# Patient Record
Sex: FEMALE | Race: WHITE | Employment: PART TIME | URBAN - METROPOLITAN AREA
[De-identification: names, ages, dates, MRNs, and addresses within clinical notes are randomized per-mention and may not be internally consistent; named-entity substitution may affect disease eponyms.]

---

## 2017-01-25 RX ORDER — LISINOPRIL 10 MG/1
TABLET ORAL
Qty: 90 TABLET | Refills: 1 | Status: SHIPPED | OUTPATIENT
Start: 2017-01-25 | End: 2017-08-16

## 2017-01-25 RX ORDER — ATORVASTATIN CALCIUM 20 MG/1
TABLET, FILM COATED ORAL
Qty: 90 TABLET | Refills: 1 | Status: SHIPPED | OUTPATIENT
Start: 2017-01-25 | End: 2017-08-16

## 2017-06-05 ENCOUNTER — HOSPITAL ENCOUNTER (OUTPATIENT)
Dept: GENERAL RADIOLOGY | Age: 57
Discharge: HOME | End: 2017-06-05
Attending: FAMILY MEDICINE
Payer: COMMERCIAL

## 2017-06-05 ENCOUNTER — OFFICE VISIT (OUTPATIENT)
Dept: FAMILY MEDICINE CLINIC | Facility: CLINIC | Age: 57
End: 2017-06-05

## 2017-06-05 VITALS
HEIGHT: 69.5 IN | DIASTOLIC BLOOD PRESSURE: 70 MMHG | SYSTOLIC BLOOD PRESSURE: 110 MMHG | RESPIRATION RATE: 16 BRPM | BODY MASS INDEX: 24.32 KG/M2 | WEIGHT: 168 LBS | HEART RATE: 64 BPM

## 2017-06-05 DIAGNOSIS — M25.512 ACUTE PAIN OF LEFT SHOULDER: ICD-10-CM

## 2017-06-05 DIAGNOSIS — E55.9 VITAMIN D DEFICIENCY: ICD-10-CM

## 2017-06-05 DIAGNOSIS — Z00.00 ROUTINE GENERAL MEDICAL EXAMINATION AT A HEALTH CARE FACILITY: Primary | ICD-10-CM

## 2017-06-05 DIAGNOSIS — I10 ESSENTIAL HYPERTENSION, BENIGN: ICD-10-CM

## 2017-06-05 DIAGNOSIS — E78.5 DYSLIPIDEMIA: ICD-10-CM

## 2017-06-05 DIAGNOSIS — L65.9 HAIR LOSS: ICD-10-CM

## 2017-06-05 DIAGNOSIS — Z13.89 SCREENING FOR GENITOURINARY CONDITION: ICD-10-CM

## 2017-06-05 PROCEDURE — 99396 PREV VISIT EST AGE 40-64: CPT | Performed by: FAMILY MEDICINE

## 2017-06-05 PROCEDURE — 73030 X-RAY EXAM OF SHOULDER: CPT | Performed by: FAMILY MEDICINE

## 2017-06-05 PROCEDURE — 81003 URINALYSIS AUTO W/O SCOPE: CPT | Performed by: FAMILY MEDICINE

## 2017-06-05 NOTE — H&P
CC: Annual Physical Exam    HPI:   Hortencia Cummings is a 49year old female who presents for a complete physical exam. Symptoms: is menopausal. Patient complains of thinning eye brows, left arm/shoulder discomfort for the past 1 month and no trauma, urin pregnancy    • Unspecified hemorrhoids without mention of complication    • Unspecified essential hypertension    • Pleurisy without mention of effusion or current tuberculosis    • Other psoriasis    • Esophageal reflux    • Migraines           Past Surgi otherwise  SKIN: denies any unusual skin lesions  EYES:denies blurred vision or double vision  HEENT: denies nasal congestion, sinus pain or ST; thinning eyebrows  LUNGS: denies shortness of breath with exertion  CARDIOVASCULAR: denies chest pain on exerti [E]    Dr. Tacos Tejeda for OB/gyne. Colonoscopy due in 2021 per Dr. Ovidio Albarran.  UA is normal.  Last eye exam -- goes yearly -- fall 2016  Last dental exam -- 4 months ago  Pt' s weight is Body mass index is 24.46 kg/(m^2). , recommended low fat diet and aerobic exercise 3

## 2017-06-06 ENCOUNTER — APPOINTMENT (OUTPATIENT)
Dept: LAB | Age: 57
End: 2017-06-06
Attending: FAMILY MEDICINE
Payer: COMMERCIAL

## 2017-06-06 DIAGNOSIS — E78.5 DYSLIPIDEMIA: ICD-10-CM

## 2017-06-06 DIAGNOSIS — I10 ESSENTIAL HYPERTENSION, BENIGN: ICD-10-CM

## 2017-06-06 DIAGNOSIS — L65.9 HAIR LOSS: ICD-10-CM

## 2017-06-06 DIAGNOSIS — E55.9 VITAMIN D DEFICIENCY: ICD-10-CM

## 2017-06-06 PROCEDURE — 80061 LIPID PANEL: CPT

## 2017-06-06 PROCEDURE — 80053 COMPREHEN METABOLIC PANEL: CPT

## 2017-06-06 PROCEDURE — 36415 COLL VENOUS BLD VENIPUNCTURE: CPT

## 2017-06-06 PROCEDURE — 84439 ASSAY OF FREE THYROXINE: CPT

## 2017-06-06 PROCEDURE — 82306 VITAMIN D 25 HYDROXY: CPT

## 2017-06-06 PROCEDURE — 84443 ASSAY THYROID STIM HORMONE: CPT

## 2017-08-18 RX ORDER — ATORVASTATIN CALCIUM 20 MG/1
TABLET, FILM COATED ORAL
Qty: 90 TABLET | Refills: 0 | Status: SHIPPED | OUTPATIENT
Start: 2017-08-18 | End: 2017-10-26

## 2017-08-18 RX ORDER — LISINOPRIL 10 MG/1
TABLET ORAL
Qty: 90 TABLET | Refills: 0 | Status: SHIPPED | OUTPATIENT
Start: 2017-08-18 | End: 2017-10-26

## 2017-10-26 RX ORDER — ATORVASTATIN CALCIUM 20 MG/1
TABLET, FILM COATED ORAL
Qty: 90 TABLET | Refills: 0 | Status: SHIPPED | OUTPATIENT
Start: 2017-10-26 | End: 2018-02-13

## 2017-10-26 RX ORDER — LISINOPRIL 10 MG/1
TABLET ORAL
Qty: 90 TABLET | Refills: 0 | Status: SHIPPED | OUTPATIENT
Start: 2017-10-26 | End: 2018-02-13

## 2017-12-19 ENCOUNTER — OFFICE VISIT (OUTPATIENT)
Dept: FAMILY MEDICINE CLINIC | Facility: CLINIC | Age: 57
End: 2017-12-19

## 2017-12-19 ENCOUNTER — LAB ENCOUNTER (OUTPATIENT)
Dept: LAB | Age: 57
End: 2017-12-19
Attending: FAMILY MEDICINE
Payer: COMMERCIAL

## 2017-12-19 VITALS
SYSTOLIC BLOOD PRESSURE: 120 MMHG | HEART RATE: 68 BPM | RESPIRATION RATE: 14 BRPM | BODY MASS INDEX: 25.05 KG/M2 | HEIGHT: 69.5 IN | DIASTOLIC BLOOD PRESSURE: 80 MMHG | WEIGHT: 173 LBS

## 2017-12-19 DIAGNOSIS — Z13.0 SCREENING, ANEMIA, DEFICIENCY, IRON: ICD-10-CM

## 2017-12-19 DIAGNOSIS — E55.9 VITAMIN D DEFICIENCY: ICD-10-CM

## 2017-12-19 DIAGNOSIS — G89.29 CHRONIC LEFT SHOULDER PAIN: ICD-10-CM

## 2017-12-19 DIAGNOSIS — E78.5 DYSLIPIDEMIA: ICD-10-CM

## 2017-12-19 DIAGNOSIS — I10 ESSENTIAL HYPERTENSION, BENIGN: Primary | ICD-10-CM

## 2017-12-19 DIAGNOSIS — L65.9 HAIR LOSS: ICD-10-CM

## 2017-12-19 DIAGNOSIS — I10 ESSENTIAL HYPERTENSION, BENIGN: ICD-10-CM

## 2017-12-19 DIAGNOSIS — G43.019 INTRACTABLE MIGRAINE WITHOUT AURA AND WITHOUT STATUS MIGRAINOSUS: ICD-10-CM

## 2017-12-19 DIAGNOSIS — M25.512 CHRONIC LEFT SHOULDER PAIN: ICD-10-CM

## 2017-12-19 PROCEDURE — 82306 VITAMIN D 25 HYDROXY: CPT

## 2017-12-19 PROCEDURE — 84439 ASSAY OF FREE THYROXINE: CPT

## 2017-12-19 PROCEDURE — 85025 COMPLETE CBC W/AUTO DIFF WBC: CPT

## 2017-12-19 PROCEDURE — 99214 OFFICE O/P EST MOD 30 MIN: CPT | Performed by: FAMILY MEDICINE

## 2017-12-19 PROCEDURE — 80061 LIPID PANEL: CPT

## 2017-12-19 PROCEDURE — 80053 COMPREHEN METABOLIC PANEL: CPT

## 2017-12-19 PROCEDURE — 84443 ASSAY THYROID STIM HORMONE: CPT

## 2017-12-19 PROCEDURE — 36415 COLL VENOUS BLD VENIPUNCTURE: CPT

## 2017-12-19 RX ORDER — OYSTER SHELL CALCIUM WITH VITAMIN D 500; 200 MG/1; [IU]/1
1 TABLET, FILM COATED ORAL DAILY
COMMUNITY
End: 2018-06-20

## 2017-12-19 NOTE — PROGRESS NOTES
Justo Rajan is a 56year old female. HPI:   Pt. Is here for a med check. Pt. Complains of her eyebrows thinning.   HTN -- pt is taking lisinopril -- no side effects; /70's at home; eye exam UTD  Dyslipidemia -- pt is taking lipitor -- no side hemorrhoids without mention of complication       Social History:  Smoking status: Never Smoker                                                              Smokeless tobacco: Never Used                      Alcohol use:  Yes              Comment: 1 a week Sitting, Cuff Size: adult)   Pulse 68   Resp 14   Ht 69.5\"   Wt 173 lb   LMP 05/07/2013   BMI 25.18 kg/m²   GENERAL: well developed, well nourished,in no apparent distress  PSYCHE: normal mood and affect  SKIN: no rashes,no suspicious lesions; very thin e

## 2018-01-16 ENCOUNTER — OFFICE VISIT (OUTPATIENT)
Dept: FAMILY MEDICINE CLINIC | Facility: CLINIC | Age: 58
End: 2018-01-16

## 2018-01-16 VITALS
HEART RATE: 102 BPM | DIASTOLIC BLOOD PRESSURE: 70 MMHG | WEIGHT: 174 LBS | RESPIRATION RATE: 18 BRPM | BODY MASS INDEX: 25.19 KG/M2 | TEMPERATURE: 99 F | HEIGHT: 69.5 IN | SYSTOLIC BLOOD PRESSURE: 110 MMHG | OXYGEN SATURATION: 100 %

## 2018-01-16 DIAGNOSIS — J02.9 SORE THROAT: ICD-10-CM

## 2018-01-16 DIAGNOSIS — J06.9 UPPER RESPIRATORY TRACT INFECTION, UNSPECIFIED TYPE: Primary | ICD-10-CM

## 2018-01-16 LAB — CONTROL LINE PRESENT WITH A CLEAR BACKGROUND (YES/NO): YES YES/NO

## 2018-01-16 PROCEDURE — 87081 CULTURE SCREEN ONLY: CPT | Performed by: NURSE PRACTITIONER

## 2018-01-16 PROCEDURE — 87880 STREP A ASSAY W/OPTIC: CPT | Performed by: NURSE PRACTITIONER

## 2018-01-16 PROCEDURE — 99213 OFFICE O/P EST LOW 20 MIN: CPT | Performed by: NURSE PRACTITIONER

## 2018-01-16 RX ORDER — AZITHROMYCIN 250 MG/1
TABLET, FILM COATED ORAL
Qty: 6 TABLET | Refills: 0 | Status: SHIPPED | OUTPATIENT
Start: 2018-01-16 | End: 2018-04-17

## 2018-01-16 NOTE — PROGRESS NOTES
Alida Munoz is a 56year old female. HPI:   Patient presents today reporting a 5 day history of a sore throat and pain when swallowing. She also reports feeling fatigued. She reports she feels as though her symptoms are getting worse.  She denies an Migraines    • Other acne    • Other psoriasis    • Pleurisy without mention of effusion or current tuberculosis    • Unspecified ectopic pregnancy without intrauterine pregnancy    • Unspecified essential hypertension    • Unspecified hemorrhoids without Consults:  None    Follow Up with:  No follow-up provider specified. 1. Upper respiratory tract infection, unspecified type  Rapid strep was negative. Will send for culture. Will start Azithromycin. Pt instructed to take medication with food.   Denis Abbott

## 2018-01-24 PROCEDURE — 87624 HPV HI-RISK TYP POOLED RSLT: CPT | Performed by: OBSTETRICS & GYNECOLOGY

## 2018-01-24 PROCEDURE — 88175 CYTOPATH C/V AUTO FLUID REDO: CPT | Performed by: OBSTETRICS & GYNECOLOGY

## 2018-02-14 RX ORDER — ATORVASTATIN CALCIUM 20 MG/1
TABLET, FILM COATED ORAL
Qty: 90 TABLET | Refills: 1 | Status: SHIPPED | OUTPATIENT
Start: 2018-02-14 | End: 2018-08-13

## 2018-02-14 RX ORDER — LISINOPRIL 10 MG/1
TABLET ORAL
Qty: 90 TABLET | Refills: 1 | Status: SHIPPED | OUTPATIENT
Start: 2018-02-14 | End: 2018-08-13

## 2018-03-13 ENCOUNTER — PATIENT OUTREACH (OUTPATIENT)
Dept: FAMILY MEDICINE CLINIC | Facility: CLINIC | Age: 58
End: 2018-03-13

## 2018-03-13 NOTE — PROGRESS NOTES
Patient's bp elevated at her office visit with OB (138/94). Pt has diagnosis of hypertension. Pt due for med visit 06/2018.  Can we have patient have her blood pressure rechecked-she can go to any [facility] and have this completed at no

## 2018-06-20 ENCOUNTER — LAB ENCOUNTER (OUTPATIENT)
Dept: LAB | Age: 58
End: 2018-06-20
Attending: FAMILY MEDICINE
Payer: COMMERCIAL

## 2018-06-20 ENCOUNTER — OFFICE VISIT (OUTPATIENT)
Dept: FAMILY MEDICINE CLINIC | Facility: CLINIC | Age: 58
End: 2018-06-20

## 2018-06-20 VITALS
HEART RATE: 72 BPM | RESPIRATION RATE: 12 BRPM | HEIGHT: 69.5 IN | BODY MASS INDEX: 25.34 KG/M2 | WEIGHT: 175 LBS | DIASTOLIC BLOOD PRESSURE: 80 MMHG | SYSTOLIC BLOOD PRESSURE: 120 MMHG

## 2018-06-20 DIAGNOSIS — Z13.29 SCREENING FOR ENDOCRINE, METABOLIC AND IMMUNITY DISORDER: ICD-10-CM

## 2018-06-20 DIAGNOSIS — Z13.228 SCREENING FOR ENDOCRINE, METABOLIC AND IMMUNITY DISORDER: Primary | ICD-10-CM

## 2018-06-20 DIAGNOSIS — Z13.29 SCREENING FOR ENDOCRINE, METABOLIC AND IMMUNITY DISORDER: Primary | ICD-10-CM

## 2018-06-20 DIAGNOSIS — E55.9 VITAMIN D DEFICIENCY: ICD-10-CM

## 2018-06-20 DIAGNOSIS — I10 ESSENTIAL HYPERTENSION, BENIGN: Primary | ICD-10-CM

## 2018-06-20 DIAGNOSIS — M25.562 ACUTE PAIN OF BOTH KNEES: ICD-10-CM

## 2018-06-20 DIAGNOSIS — Z78.0 POST-MENOPAUSAL: ICD-10-CM

## 2018-06-20 DIAGNOSIS — Z13.0 SCREENING FOR ENDOCRINE, METABOLIC AND IMMUNITY DISORDER: Primary | ICD-10-CM

## 2018-06-20 DIAGNOSIS — Z13.29 SCREENING FOR THYROID DISORDER: ICD-10-CM

## 2018-06-20 DIAGNOSIS — E78.5 DYSLIPIDEMIA: ICD-10-CM

## 2018-06-20 DIAGNOSIS — Z13.820 SCREENING FOR OSTEOPOROSIS: ICD-10-CM

## 2018-06-20 DIAGNOSIS — M25.561 ACUTE PAIN OF BOTH KNEES: ICD-10-CM

## 2018-06-20 DIAGNOSIS — I10 ESSENTIAL HYPERTENSION, BENIGN: ICD-10-CM

## 2018-06-20 DIAGNOSIS — L65.9 HAIR LOSS: ICD-10-CM

## 2018-06-20 DIAGNOSIS — Z13.228 SCREENING FOR ENDOCRINE, METABOLIC AND IMMUNITY DISORDER: ICD-10-CM

## 2018-06-20 DIAGNOSIS — M67.40 GANGLION CYST: ICD-10-CM

## 2018-06-20 DIAGNOSIS — Z13.0 SCREENING FOR ENDOCRINE, METABOLIC AND IMMUNITY DISORDER: ICD-10-CM

## 2018-06-20 PROCEDURE — 86706 HEP B SURFACE ANTIBODY: CPT

## 2018-06-20 PROCEDURE — 82306 VITAMIN D 25 HYDROXY: CPT

## 2018-06-20 PROCEDURE — 86200 CCP ANTIBODY: CPT

## 2018-06-20 PROCEDURE — 86140 C-REACTIVE PROTEIN: CPT

## 2018-06-20 PROCEDURE — 80053 COMPREHEN METABOLIC PANEL: CPT

## 2018-06-20 PROCEDURE — 84443 ASSAY THYROID STIM HORMONE: CPT

## 2018-06-20 PROCEDURE — 85652 RBC SED RATE AUTOMATED: CPT

## 2018-06-20 PROCEDURE — 99214 OFFICE O/P EST MOD 30 MIN: CPT | Performed by: FAMILY MEDICINE

## 2018-06-20 PROCEDURE — 86376 MICROSOMAL ANTIBODY EACH: CPT

## 2018-06-20 PROCEDURE — 84550 ASSAY OF BLOOD/URIC ACID: CPT

## 2018-06-20 PROCEDURE — 80061 LIPID PANEL: CPT

## 2018-06-20 PROCEDURE — 86431 RHEUMATOID FACTOR QUANT: CPT

## 2018-06-20 PROCEDURE — 36415 COLL VENOUS BLD VENIPUNCTURE: CPT

## 2018-06-20 PROCEDURE — 86038 ANTINUCLEAR ANTIBODIES: CPT

## 2018-06-20 PROCEDURE — 84439 ASSAY OF FREE THYROXINE: CPT

## 2018-06-20 PROCEDURE — 86800 THYROGLOBULIN ANTIBODY: CPT

## 2018-06-20 RX ORDER — OMEPRAZOLE 20 MG/1
20 CAPSULE, DELAYED RELEASE ORAL EVERY OTHER DAY
COMMUNITY
End: 2021-01-22

## 2018-06-20 RX ORDER — SUMATRIPTAN 100 MG/1
TABLET, FILM COATED ORAL
Qty: 27 TABLET | Refills: 0 | Status: SHIPPED | OUTPATIENT
Start: 2018-06-20

## 2018-06-20 NOTE — PROGRESS NOTES
Kourtney Boyd is a 66year old female. HPI:   Pt. Is here for a med check. Pt complains of bump on her right index finger for the past week. Pt. Complains of dry skin on her feet for the past few months and does not know why.   She has been walking cholesterol    • Hypercholesterolemia    • Migraines    • Other psoriasis    • Rosacea    • Skin blushing/flushing    • Unspecified essential hypertension    • Wears glasses       Social History:  Smoking status: Never Smoker AM                                OB/GYNE AT [facility]                                                       First Screen:          Cris Hammonds                                                      Rescreen:              Stanley Mendiola immunity disorder      Orders Placed This Encounter      Lipid Panel      Comp Metabolic Panel (14) [E]      Vitamin D, 25-Hydroxy [E]      TSH and Free T4 [E]      Rheumatoid Arthritis Factor      Uric Acid, Serum      Cyclic Citrullinate Peptide (CCP) an

## 2018-06-22 ENCOUNTER — HOSPITAL ENCOUNTER (OUTPATIENT)
Dept: BONE DENSITY | Age: 58
Discharge: HOME | End: 2018-06-22
Attending: FAMILY MEDICINE
Payer: COMMERCIAL

## 2018-06-22 ENCOUNTER — HOSPITAL ENCOUNTER (OUTPATIENT)
Dept: GENERAL RADIOLOGY | Age: 58
Discharge: HOME | End: 2018-06-22
Attending: FAMILY MEDICINE
Payer: COMMERCIAL

## 2018-06-22 DIAGNOSIS — M25.561 ACUTE PAIN OF BOTH KNEES: ICD-10-CM

## 2018-06-22 DIAGNOSIS — M25.562 ACUTE PAIN OF BOTH KNEES: ICD-10-CM

## 2018-06-22 DIAGNOSIS — Z13.820 SCREENING FOR OSTEOPOROSIS: ICD-10-CM

## 2018-06-22 DIAGNOSIS — Z78.0 POST-MENOPAUSAL: ICD-10-CM

## 2018-06-22 DIAGNOSIS — E55.9 VITAMIN D DEFICIENCY: ICD-10-CM

## 2018-06-22 PROCEDURE — 73560 X-RAY EXAM OF KNEE 1 OR 2: CPT | Performed by: FAMILY MEDICINE

## 2018-06-22 PROCEDURE — 77080 DXA BONE DENSITY AXIAL: CPT | Performed by: FAMILY MEDICINE

## 2018-06-26 ENCOUNTER — PATIENT MESSAGE (OUTPATIENT)
Dept: FAMILY MEDICINE CLINIC | Facility: CLINIC | Age: 58
End: 2018-06-26

## 2018-06-26 NOTE — TELEPHONE ENCOUNTER
From: Zachary Magaña  To: Chris Sanchez DO  Sent: 6/26/2018 7:58 AM CDT  Subject: Prescription Question    Hi,    Unfortunately [facility] is being funny about filling my prescription. This is a copy of the message I received.    SUMATRIPTAN TABS

## 2018-06-26 NOTE — TELEPHONE ENCOUNTER
I do not know anything about this. Please call [facility] and see if the prescription is being processed.

## 2018-07-17 ENCOUNTER — OFFICE VISIT (OUTPATIENT)
Dept: PHYSICAL THERAPY | Age: 58
End: 2018-07-17
Attending: FAMILY MEDICINE
Payer: COMMERCIAL

## 2018-07-17 DIAGNOSIS — M25.562 ACUTE PAIN OF BOTH KNEES: ICD-10-CM

## 2018-07-17 DIAGNOSIS — M25.561 ACUTE PAIN OF BOTH KNEES: ICD-10-CM

## 2018-07-17 PROCEDURE — 97162 PT EVAL MOD COMPLEX 30 MIN: CPT

## 2018-07-17 PROCEDURE — 97110 THERAPEUTIC EXERCISES: CPT

## 2018-07-17 NOTE — PROGRESS NOTES
LOWER EXTREMITY EVALUATION:   Referring Physician: Dr. Candace Gonzalez  Diagnosis: bilateral knee pain      Date of Service: 7/17/2018     PATIENT SUMMARY   Cristhian Foster is a 66year old y/o female who presents to therapy today with complaints of May-June s Significant findings include high cholesterol, HTN, anemia, OA    ASSESSMENT  Pt demonstrates pain consistent with knee OA L>R. She has capsular restrictions with knee extension limitations passively L>R.  She has pain with knee flexion AROM bilaterally denson -  Posterior Drawer Test: R -, L -  Denys's Test: R -, L -  Apley Compression: R -, L -  Thessaly Test: R -, L -  Patellar Apprehension: R -, L -  SLS: R 30 sec, L 30 sec  Double Leg Squat:  Anterior tibial translation, *anterior knee pain 1/2 range   S instructions; modalities as indicated    Education or treatment limitation: None  Rehab Potential:good    Patient/Family/Caregiver was advised of these findings, precautions, and treatment options and has agreed to actively participate in planning and for

## 2018-07-24 ENCOUNTER — OFFICE VISIT (OUTPATIENT)
Dept: PHYSICAL THERAPY | Age: 58
End: 2018-07-24
Attending: FAMILY MEDICINE
Payer: COMMERCIAL

## 2018-07-24 PROCEDURE — 97140 MANUAL THERAPY 1/> REGIONS: CPT

## 2018-07-24 PROCEDURE — 97530 THERAPEUTIC ACTIVITIES: CPT

## 2018-07-24 PROCEDURE — 97110 THERAPEUTIC EXERCISES: CPT

## 2018-07-24 NOTE — PROGRESS NOTES
Dx: bilateral knee pain          Authorized # of Visits:  No cpy no precert C         Next MD visit: as needed  Fall Risk: standard         Precautions: none             Subjective: Pt reports she walked this Am and felt her knees on the decline and init 30 sec hold x 2 sets R/L         Hip abduction 10 reps R/L         Bridge 10 sec hold 10 reps bent knee          Bridge on ball - straight leg 10 reps x 2 sets         Swiss ball rolls 20 reps          Standing quad stretch 30 sec x 2 sets         SLS 30 s

## 2018-07-26 ENCOUNTER — APPOINTMENT (OUTPATIENT)
Dept: PHYSICAL THERAPY | Age: 58
End: 2018-07-26
Attending: FAMILY MEDICINE
Payer: COMMERCIAL

## 2018-07-31 ENCOUNTER — APPOINTMENT (OUTPATIENT)
Dept: PHYSICAL THERAPY | Age: 58
End: 2018-07-31
Attending: FAMILY MEDICINE
Payer: COMMERCIAL

## 2018-08-02 ENCOUNTER — APPOINTMENT (OUTPATIENT)
Dept: PHYSICAL THERAPY | Age: 58
End: 2018-08-02
Attending: FAMILY MEDICINE
Payer: COMMERCIAL

## 2018-08-09 ENCOUNTER — APPOINTMENT (OUTPATIENT)
Dept: PHYSICAL THERAPY | Age: 58
End: 2018-08-09
Attending: FAMILY MEDICINE
Payer: COMMERCIAL

## 2018-08-14 ENCOUNTER — OFFICE VISIT (OUTPATIENT)
Dept: FAMILY MEDICINE CLINIC | Facility: CLINIC | Age: 58
End: 2018-08-14
Payer: COMMERCIAL

## 2018-08-14 VITALS
SYSTOLIC BLOOD PRESSURE: 120 MMHG | HEART RATE: 72 BPM | WEIGHT: 170 LBS | HEIGHT: 69.5 IN | RESPIRATION RATE: 16 BRPM | BODY MASS INDEX: 24.61 KG/M2 | DIASTOLIC BLOOD PRESSURE: 70 MMHG

## 2018-08-14 DIAGNOSIS — I10 ESSENTIAL HYPERTENSION, BENIGN: ICD-10-CM

## 2018-08-14 DIAGNOSIS — Z13.0 SCREENING FOR DEFICIENCY ANEMIA: ICD-10-CM

## 2018-08-14 DIAGNOSIS — L65.9 ALOPECIA OF SCALP: ICD-10-CM

## 2018-08-14 DIAGNOSIS — M85.89 OSTEOPENIA OF MULTIPLE SITES: Primary | ICD-10-CM

## 2018-08-14 DIAGNOSIS — E78.5 DYSLIPIDEMIA: ICD-10-CM

## 2018-08-14 DIAGNOSIS — E55.9 VITAMIN D DEFICIENCY: ICD-10-CM

## 2018-08-14 DIAGNOSIS — G43.019 INTRACTABLE MIGRAINE WITHOUT AURA AND WITHOUT STATUS MIGRAINOSUS: ICD-10-CM

## 2018-08-14 PROCEDURE — 99214 OFFICE O/P EST MOD 30 MIN: CPT | Performed by: FAMILY MEDICINE

## 2018-08-14 RX ORDER — ATORVASTATIN CALCIUM 20 MG/1
TABLET, FILM COATED ORAL
Qty: 90 TABLET | Refills: 1 | Status: SHIPPED | OUTPATIENT
Start: 2018-08-14 | End: 2018-12-11

## 2018-08-14 RX ORDER — LISINOPRIL 10 MG/1
TABLET ORAL
Qty: 90 TABLET | Refills: 1 | Status: SHIPPED | OUTPATIENT
Start: 2018-08-14 | End: 2018-12-10

## 2018-08-14 RX ORDER — PIMECROLIMUS 1 %
CREAM (GRAM) TOPICAL
Refills: 2 | COMMUNITY
Start: 2018-07-19

## 2018-08-14 RX ORDER — ALENDRONATE SODIUM 70 MG/1
70 TABLET ORAL WEEKLY
Qty: 12 TABLET | Refills: 1 | Status: SHIPPED | OUTPATIENT
Start: 2018-08-14 | End: 2018-11-19

## 2018-08-14 RX ORDER — ACETAMINOPHEN 160 MG
2000 TABLET,DISINTEGRATING ORAL DAILY
COMMUNITY

## 2018-08-14 RX ORDER — PHENOL 1.4 %
600 AEROSOL, SPRAY (ML) MUCOUS MEMBRANE
COMMUNITY

## 2018-08-14 RX ORDER — CLOBETASOL PROPIONATE 0.46 MG/ML
SOLUTION TOPICAL
COMMUNITY
Start: 2018-07-19 | End: 2020-08-22

## 2018-08-14 NOTE — PROGRESS NOTES
Flaquito Swift is a 66year old female. HPI:   Pt. Is here to discuss her bone density results. She states that her mother recently fell and broke her femur. Patient would like to know the pros and cons to medications and natural options.   Patient h Hypercholesterolemia    • Migraines    • Other psoriasis    • Rosacea    • Skin blushing/flushing    • Unspecified essential hypertension    • Wears glasses       Social History:  Smoking status: Never Smoker REFLEX TO 9 ENAS   Result Value Ref Range   Marianne Screen Negative Negative   -THYROID PEROXIDASE AND THYROGLOBULIN ANTIBODIES   Result Value Ref Range   Anti-Thyroglobulin <15 <60 U/mL   Anti-Thyroperoxidase <28 <60 U/mL   -HEPATITIS B SURFACE ANTIBODY   Res due in 1 month. Other labs due in December. Length of visit: 25 minutes and over 50% was spent counseling and coordination of care. The patient indicates understanding of these issues and agrees to the plan.   Return in about 4 months (around 12/14/2018)

## 2018-11-19 RX ORDER — ALENDRONATE SODIUM 70 MG/1
70 TABLET ORAL WEEKLY
Qty: 12 TABLET | Refills: 3 | Status: SHIPPED | OUTPATIENT
Start: 2018-11-19 | End: 2019-10-28

## 2018-12-10 RX ORDER — LISINOPRIL 10 MG/1
10 TABLET ORAL
Qty: 90 TABLET | Refills: 1 | Status: SHIPPED | OUTPATIENT
Start: 2018-12-10 | End: 2019-06-21

## 2018-12-12 RX ORDER — ATORVASTATIN CALCIUM 20 MG/1
20 TABLET, FILM COATED ORAL NIGHTLY
Qty: 90 TABLET | Refills: 0 | Status: SHIPPED | OUTPATIENT
Start: 2018-12-12 | End: 2019-03-20

## 2019-01-18 ENCOUNTER — OFFICE VISIT (OUTPATIENT)
Dept: FAMILY MEDICINE CLINIC | Facility: CLINIC | Age: 59
End: 2019-01-18
Payer: COMMERCIAL

## 2019-01-18 VITALS
TEMPERATURE: 98 F | BODY MASS INDEX: 24.61 KG/M2 | HEART RATE: 72 BPM | SYSTOLIC BLOOD PRESSURE: 110 MMHG | RESPIRATION RATE: 18 BRPM | HEIGHT: 69.5 IN | DIASTOLIC BLOOD PRESSURE: 78 MMHG | WEIGHT: 170 LBS

## 2019-01-18 DIAGNOSIS — K21.9 GASTROESOPHAGEAL REFLUX DISEASE, ESOPHAGITIS PRESENCE NOT SPECIFIED: ICD-10-CM

## 2019-01-18 DIAGNOSIS — G43.019 INTRACTABLE MIGRAINE WITHOUT AURA AND WITHOUT STATUS MIGRAINOSUS: ICD-10-CM

## 2019-01-18 DIAGNOSIS — I10 ESSENTIAL HYPERTENSION, BENIGN: Primary | ICD-10-CM

## 2019-01-18 DIAGNOSIS — Z13.0 SCREENING FOR ENDOCRINE, METABOLIC AND IMMUNITY DISORDER: ICD-10-CM

## 2019-01-18 DIAGNOSIS — E78.5 DYSLIPIDEMIA: ICD-10-CM

## 2019-01-18 DIAGNOSIS — L71.9 ROSACEA: ICD-10-CM

## 2019-01-18 DIAGNOSIS — M85.89 OSTEOPENIA OF MULTIPLE SITES: ICD-10-CM

## 2019-01-18 DIAGNOSIS — E55.9 VITAMIN D DEFICIENCY: ICD-10-CM

## 2019-01-18 DIAGNOSIS — Z13.228 SCREENING FOR ENDOCRINE, METABOLIC AND IMMUNITY DISORDER: ICD-10-CM

## 2019-01-18 DIAGNOSIS — Z13.29 SCREENING FOR ENDOCRINE, METABOLIC AND IMMUNITY DISORDER: ICD-10-CM

## 2019-01-18 PROCEDURE — 99214 OFFICE O/P EST MOD 30 MIN: CPT | Performed by: FAMILY MEDICINE

## 2019-01-18 RX ORDER — HYDROXYCHLOROQUINE SULFATE 200 MG/1
200 TABLET, FILM COATED ORAL
Refills: 0 | COMMUNITY
Start: 2019-01-09 | End: 2019-11-08

## 2019-01-18 NOTE — PROGRESS NOTES
Paige Posada is a 66year old female. HPI:   Pt. Is here for a med check.     HTN -- pt is taking lisinopril -- no side effects; /70's at home; eye exam UTD  Dyslipidemia -- pt is taking lipitor -- no side effects  Migraines -- stable; cpm  Ros mouth. Disp:  Rfl:       No Known Allergies   Past Medical History:   Diagnosis Date   • Abdominal hernia    • Esophageal reflux    • High cholesterol    • Hypercholesterolemia    • Migraines    • Other psoriasis    • Rosacea    • Skin blushing/flushing (AUTOMATED)   Result Value Ref Range    Sed Rate 10 0 - 25 mm/Hr   C-REACTIVE PROTEIN   Result Value Ref Range    C-Reactive Protein <0.29 <1.00 mg/dL   MARIANNE, DIRECT, REFLEX TO 9 ENAS   Result Value Ref Range    Marianne Screen Negative Negative   THYROID PEROXI without status migrainosus  Rosacea  Screening for endocrine, metabolic and immunity disorder  Gastroesophageal reflux disease, esophagitis presence not specified    Orders Placed This Encounter      MMR Panel([place] Immunity Panel) [E]      Meds & Refill

## 2019-02-15 ENCOUNTER — LAB ENCOUNTER (OUTPATIENT)
Dept: LAB | Age: 59
End: 2019-02-15
Attending: FAMILY MEDICINE
Payer: COMMERCIAL

## 2019-02-15 DIAGNOSIS — I10 ESSENTIAL HYPERTENSION, BENIGN: ICD-10-CM

## 2019-02-15 DIAGNOSIS — Z13.0 SCREENING FOR DEFICIENCY ANEMIA: ICD-10-CM

## 2019-02-15 DIAGNOSIS — E78.5 DYSLIPIDEMIA: ICD-10-CM

## 2019-02-15 DIAGNOSIS — E55.9 VITAMIN D DEFICIENCY: ICD-10-CM

## 2019-02-15 LAB
ALBUMIN SERPL-MCNC: 4 G/DL (ref 3.4–5)
ALBUMIN/GLOB SERPL: 1.2 {RATIO} (ref 1–2)
ALP LIVER SERPL-CCNC: 44 U/L (ref 46–118)
ALT SERPL-CCNC: 36 U/L (ref 13–56)
ANION GAP SERPL CALC-SCNC: 6 MMOL/L (ref 0–18)
AST SERPL-CCNC: 31 U/L (ref 15–37)
BASOPHILS # BLD AUTO: 0.04 X10(3) UL (ref 0–0.2)
BASOPHILS NFR BLD AUTO: 0.9 %
BILIRUB SERPL-MCNC: 0.4 MG/DL (ref 0.1–2)
BUN BLD-MCNC: 14 MG/DL (ref 7–18)
BUN/CREAT SERPL: 15.6 (ref 10–20)
CALCIUM BLD-MCNC: 8.7 MG/DL (ref 8.5–10.1)
CHLORIDE SERPL-SCNC: 107 MMOL/L (ref 98–107)
CHOLEST SMN-MCNC: 210 MG/DL (ref ?–200)
CO2 SERPL-SCNC: 30 MMOL/L (ref 21–32)
CREAT BLD-MCNC: 0.9 MG/DL (ref 0.55–1.02)
DEPRECATED RDW RBC AUTO: 45.2 FL (ref 35.1–46.3)
EOSINOPHIL # BLD AUTO: 0.09 X10(3) UL (ref 0–0.7)
EOSINOPHIL NFR BLD AUTO: 2 %
ERYTHROCYTE [DISTWIDTH] IN BLOOD BY AUTOMATED COUNT: 13.3 % (ref 11–15)
GLOBULIN PLAS-MCNC: 3.3 G/DL (ref 2.8–4.4)
GLUCOSE BLD-MCNC: 86 MG/DL (ref 70–99)
HCT VFR BLD AUTO: 35.4 % (ref 35–48)
HDLC SERPL-MCNC: 88 MG/DL (ref 40–59)
HGB BLD-MCNC: 11.5 G/DL (ref 12–16)
IMM GRANULOCYTES # BLD AUTO: 0.01 X10(3) UL (ref 0–1)
IMM GRANULOCYTES NFR BLD: 0.2 %
LDLC SERPL CALC-MCNC: 104 MG/DL (ref ?–100)
LYMPHOCYTES # BLD AUTO: 1.25 X10(3) UL (ref 1–4)
LYMPHOCYTES NFR BLD AUTO: 28 %
M PROTEIN MFR SERPL ELPH: 7.3 G/DL (ref 6.4–8.2)
MCH RBC QN AUTO: 30.3 PG (ref 26–34)
MCHC RBC AUTO-ENTMCNC: 32.5 G/DL (ref 31–37)
MCV RBC AUTO: 93.2 FL (ref 80–100)
MONOCYTES # BLD AUTO: 0.39 X10(3) UL (ref 0.1–1)
MONOCYTES NFR BLD AUTO: 8.7 %
NEUTROPHILS # BLD AUTO: 2.68 X10 (3) UL (ref 1.5–7.7)
NEUTROPHILS # BLD AUTO: 2.68 X10(3) UL (ref 1.5–7.7)
NEUTROPHILS NFR BLD AUTO: 60.2 %
NONHDLC SERPL-MCNC: 122 MG/DL (ref ?–130)
OSMOLALITY SERPL CALC.SUM OF ELEC: 296 MOSM/KG (ref 275–295)
PLATELET # BLD AUTO: 305 10(3)UL (ref 150–450)
POTASSIUM SERPL-SCNC: 4.2 MMOL/L (ref 3.5–5.1)
RBC # BLD AUTO: 3.8 X10(6)UL (ref 3.8–5.3)
SODIUM SERPL-SCNC: 143 MMOL/L (ref 136–145)
TRIGL SERPL-MCNC: 92 MG/DL (ref 30–149)
VIT D+METAB SERPL-MCNC: 51.8 NG/ML (ref 30–100)
VLDLC SERPL CALC-MCNC: 18 MG/DL (ref 0–30)
WBC # BLD AUTO: 4.5 X10(3) UL (ref 4–11)

## 2019-02-15 PROCEDURE — 36415 COLL VENOUS BLD VENIPUNCTURE: CPT | Performed by: FAMILY MEDICINE

## 2019-02-15 PROCEDURE — 80061 LIPID PANEL: CPT | Performed by: FAMILY MEDICINE

## 2019-02-15 PROCEDURE — 80053 COMPREHEN METABOLIC PANEL: CPT | Performed by: FAMILY MEDICINE

## 2019-02-15 PROCEDURE — 82306 VITAMIN D 25 HYDROXY: CPT | Performed by: FAMILY MEDICINE

## 2019-02-15 PROCEDURE — 85025 COMPLETE CBC W/AUTO DIFF WBC: CPT | Performed by: FAMILY MEDICINE

## 2019-03-20 RX ORDER — ATORVASTATIN CALCIUM 20 MG/1
20 TABLET, FILM COATED ORAL NIGHTLY
Qty: 90 TABLET | Refills: 1 | Status: SHIPPED | OUTPATIENT
Start: 2019-03-20 | End: 2019-10-02

## 2019-03-26 ENCOUNTER — LAB ENCOUNTER (OUTPATIENT)
Dept: LAB | Age: 59
End: 2019-03-26
Attending: FAMILY MEDICINE
Payer: COMMERCIAL

## 2019-03-26 DIAGNOSIS — R79.89 ABNORMAL CBC: ICD-10-CM

## 2019-03-26 LAB
BASOPHILS # BLD AUTO: 0.03 X10(3) UL (ref 0–0.2)
BASOPHILS NFR BLD AUTO: 0.6 %
DEPRECATED RDW RBC AUTO: 46.5 FL (ref 35.1–46.3)
EOSINOPHIL # BLD AUTO: 0.07 X10(3) UL (ref 0–0.7)
EOSINOPHIL NFR BLD AUTO: 1.4 %
ERYTHROCYTE [DISTWIDTH] IN BLOOD BY AUTOMATED COUNT: 13.6 % (ref 11–15)
HCT VFR BLD AUTO: 36.1 % (ref 35–48)
HGB BLD-MCNC: 12.1 G/DL (ref 12–16)
IMM GRANULOCYTES # BLD AUTO: 0.01 X10(3) UL (ref 0–1)
IMM GRANULOCYTES NFR BLD: 0.2 %
LYMPHOCYTES # BLD AUTO: 1.41 X10(3) UL (ref 1–4)
LYMPHOCYTES NFR BLD AUTO: 29 %
MCH RBC QN AUTO: 31.1 PG (ref 26–34)
MCHC RBC AUTO-ENTMCNC: 33.5 G/DL (ref 31–37)
MCV RBC AUTO: 92.8 FL (ref 80–100)
MONOCYTES # BLD AUTO: 0.42 X10(3) UL (ref 0.1–1)
MONOCYTES NFR BLD AUTO: 8.6 %
NEUTROPHILS # BLD AUTO: 2.92 X10 (3) UL (ref 1.5–7.7)
NEUTROPHILS # BLD AUTO: 2.92 X10(3) UL (ref 1.5–7.7)
NEUTROPHILS NFR BLD AUTO: 60.2 %
PLATELET # BLD AUTO: 317 10(3)UL (ref 150–450)
RBC # BLD AUTO: 3.89 X10(6)UL (ref 3.8–5.3)
WBC # BLD AUTO: 4.9 X10(3) UL (ref 4–11)

## 2019-03-26 PROCEDURE — 85025 COMPLETE CBC W/AUTO DIFF WBC: CPT | Performed by: NURSE PRACTITIONER

## 2019-03-26 PROCEDURE — 36415 COLL VENOUS BLD VENIPUNCTURE: CPT | Performed by: NURSE PRACTITIONER

## 2019-06-21 RX ORDER — LISINOPRIL 10 MG/1
TABLET ORAL
Qty: 90 TABLET | Refills: 0 | Status: SHIPPED | OUTPATIENT
Start: 2019-06-21 | End: 2019-10-02

## 2019-07-18 ENCOUNTER — APPOINTMENT (OUTPATIENT)
Dept: LAB | Age: 59
End: 2019-07-18
Attending: FAMILY MEDICINE
Payer: COMMERCIAL

## 2019-07-18 ENCOUNTER — OFFICE VISIT (OUTPATIENT)
Dept: FAMILY MEDICINE CLINIC | Facility: CLINIC | Age: 59
End: 2019-07-18
Payer: COMMERCIAL

## 2019-07-18 VITALS
SYSTOLIC BLOOD PRESSURE: 114 MMHG | HEART RATE: 78 BPM | WEIGHT: 175 LBS | HEIGHT: 70 IN | DIASTOLIC BLOOD PRESSURE: 76 MMHG | BODY MASS INDEX: 25.05 KG/M2 | RESPIRATION RATE: 16 BRPM

## 2019-07-18 DIAGNOSIS — E78.5 DYSLIPIDEMIA: ICD-10-CM

## 2019-07-18 DIAGNOSIS — Z11.59 ENCOUNTER FOR HEPATITIS C SCREENING TEST FOR LOW RISK PATIENT: ICD-10-CM

## 2019-07-18 DIAGNOSIS — L65.9 ALOPECIA OF SCALP: ICD-10-CM

## 2019-07-18 DIAGNOSIS — L71.9 ROSACEA: ICD-10-CM

## 2019-07-18 DIAGNOSIS — K21.9 GASTROESOPHAGEAL REFLUX DISEASE, ESOPHAGITIS PRESENCE NOT SPECIFIED: ICD-10-CM

## 2019-07-18 DIAGNOSIS — G43.019 INTRACTABLE MIGRAINE WITHOUT AURA AND WITHOUT STATUS MIGRAINOSUS: ICD-10-CM

## 2019-07-18 DIAGNOSIS — M85.89 OSTEOPENIA OF MULTIPLE SITES: ICD-10-CM

## 2019-07-18 DIAGNOSIS — E55.9 VITAMIN D DEFICIENCY: ICD-10-CM

## 2019-07-18 DIAGNOSIS — Z71.85 VACCINE COUNSELING: ICD-10-CM

## 2019-07-18 DIAGNOSIS — Z78.0 POST-MENOPAUSAL: ICD-10-CM

## 2019-07-18 DIAGNOSIS — I10 ESSENTIAL HYPERTENSION, BENIGN: ICD-10-CM

## 2019-07-18 DIAGNOSIS — I10 ESSENTIAL HYPERTENSION, BENIGN: Primary | ICD-10-CM

## 2019-07-18 LAB
ALBUMIN SERPL-MCNC: 3.8 G/DL (ref 3.4–5)
ALBUMIN/GLOB SERPL: 1.1 {RATIO} (ref 1–2)
ALP LIVER SERPL-CCNC: 43 U/L (ref 46–118)
ALT SERPL-CCNC: 29 U/L (ref 13–56)
ANION GAP SERPL CALC-SCNC: 4 MMOL/L (ref 0–18)
AST SERPL-CCNC: 29 U/L (ref 15–37)
BILIRUB SERPL-MCNC: 0.4 MG/DL (ref 0.1–2)
BUN BLD-MCNC: 10 MG/DL (ref 7–18)
BUN/CREAT SERPL: 11.1 (ref 10–20)
CALCIUM BLD-MCNC: 8.7 MG/DL (ref 8.5–10.1)
CHLORIDE SERPL-SCNC: 105 MMOL/L (ref 98–112)
CHOLEST SMN-MCNC: 193 MG/DL (ref ?–200)
CO2 SERPL-SCNC: 29 MMOL/L (ref 21–32)
CREAT BLD-MCNC: 0.9 MG/DL (ref 0.55–1.02)
GLOBULIN PLAS-MCNC: 3.5 G/DL (ref 2.8–4.4)
GLUCOSE BLD-MCNC: 77 MG/DL (ref 70–99)
HCV AB SERPL QL IA: NONREACTIVE
HDLC SERPL-MCNC: 82 MG/DL (ref 40–59)
LDLC SERPL CALC-MCNC: 94 MG/DL (ref ?–100)
M PROTEIN MFR SERPL ELPH: 7.3 G/DL (ref 6.4–8.2)
NONHDLC SERPL-MCNC: 111 MG/DL (ref ?–130)
OSMOLALITY SERPL CALC.SUM OF ELEC: 284 MOSM/KG (ref 275–295)
POTASSIUM SERPL-SCNC: 4.1 MMOL/L (ref 3.5–5.1)
SODIUM SERPL-SCNC: 138 MMOL/L (ref 136–145)
TRIGL SERPL-MCNC: 85 MG/DL (ref 30–149)
VLDLC SERPL CALC-MCNC: 17 MG/DL (ref 0–30)

## 2019-07-18 PROCEDURE — 86803 HEPATITIS C AB TEST: CPT | Performed by: FAMILY MEDICINE

## 2019-07-18 PROCEDURE — 99214 OFFICE O/P EST MOD 30 MIN: CPT | Performed by: FAMILY MEDICINE

## 2019-07-18 PROCEDURE — 36415 COLL VENOUS BLD VENIPUNCTURE: CPT | Performed by: FAMILY MEDICINE

## 2019-07-18 PROCEDURE — 80061 LIPID PANEL: CPT | Performed by: FAMILY MEDICINE

## 2019-07-18 PROCEDURE — 80053 COMPREHEN METABOLIC PANEL: CPT | Performed by: FAMILY MEDICINE

## 2019-07-18 NOTE — PROGRESS NOTES
Wade Buck is a 64year old female. HPI:   Pt. Is here for a med check.     HTN -- pt is taking lisinopril -- no side effects; /70's at home; eye exam -- due  Dyslipidemia -- pt is taking lipitor -- no side effects  Migraines -- stable; cpm Vitamins-Minerals (ONE-A-DAY WOMENS 50 PLUS OR) Take  by mouth.  Disp:  Rfl:       No Known Allergies   Past Medical History:   Diagnosis Date   • Abdominal hernia    • Esophageal reflux    • High cholesterol    • Hypercholesterolemia    • Migraines    • Ot anxiety  HEMATOLOGIC:  hx of anemia  ENDOCRINE: denies thyroid history; thinning eye brows  And hair loss  ALL/ASTHMA: denies hx of allergy or asthma    EXAM:   /76   Pulse 78   Resp 16   Ht 70\"   Wt 175 lb   LMP 05/07/2013   BMI 25.11 kg/m²   GENER watch her diet  Eyebrow thinning Eulene Feliz thinning-- seeing derm    Mammo due per gyne - Dr. Toribio Viera is UTD. Colonoscopy due in 2021.  -- Dr. Clau Castro  She is walking. Sees derm and gyne.     The patient indicates understanding of these issues and agrees to the p

## 2019-10-03 RX ORDER — LISINOPRIL 10 MG/1
TABLET ORAL
Qty: 90 TABLET | Refills: 0 | Status: SHIPPED | OUTPATIENT
Start: 2019-10-03 | End: 2020-01-06

## 2019-10-03 RX ORDER — ATORVASTATIN CALCIUM 20 MG/1
TABLET, FILM COATED ORAL
Qty: 90 TABLET | Refills: 0 | Status: SHIPPED | OUTPATIENT
Start: 2019-10-03 | End: 2020-01-06

## 2019-10-28 RX ORDER — ALENDRONATE SODIUM 70 MG/1
70 TABLET ORAL WEEKLY
Qty: 12 TABLET | Refills: 3 | Status: SHIPPED | OUTPATIENT
Start: 2019-10-28 | End: 2020-08-22

## 2020-01-06 RX ORDER — LISINOPRIL 10 MG/1
TABLET ORAL
Qty: 90 TABLET | Refills: 0 | Status: SHIPPED | OUTPATIENT
Start: 2020-01-06 | End: 2020-03-13

## 2020-01-06 RX ORDER — ATORVASTATIN CALCIUM 20 MG/1
TABLET, FILM COATED ORAL
Qty: 90 TABLET | Refills: 0 | Status: SHIPPED | OUTPATIENT
Start: 2020-01-06 | End: 2020-03-13

## 2020-01-21 ENCOUNTER — HOSPITAL ENCOUNTER (OUTPATIENT)
Dept: GENERAL RADIOLOGY | Age: 60
Discharge: HOME | End: 2020-01-21
Attending: FAMILY MEDICINE
Payer: COMMERCIAL

## 2020-01-21 ENCOUNTER — OFFICE VISIT (OUTPATIENT)
Dept: FAMILY MEDICINE CLINIC | Facility: CLINIC | Age: 60
End: 2020-01-21
Payer: COMMERCIAL

## 2020-01-21 ENCOUNTER — LAB ENCOUNTER (OUTPATIENT)
Dept: LAB | Age: 60
End: 2020-01-21
Attending: FAMILY MEDICINE
Payer: COMMERCIAL

## 2020-01-21 VITALS
SYSTOLIC BLOOD PRESSURE: 130 MMHG | HEART RATE: 72 BPM | WEIGHT: 176 LBS | DIASTOLIC BLOOD PRESSURE: 80 MMHG | RESPIRATION RATE: 20 BRPM | HEIGHT: 69 IN | BODY MASS INDEX: 26.07 KG/M2

## 2020-01-21 DIAGNOSIS — I10 ESSENTIAL HYPERTENSION, BENIGN: ICD-10-CM

## 2020-01-21 DIAGNOSIS — I10 ESSENTIAL HYPERTENSION, BENIGN: Primary | ICD-10-CM

## 2020-01-21 DIAGNOSIS — E78.5 DYSLIPIDEMIA: ICD-10-CM

## 2020-01-21 DIAGNOSIS — Z13.0 SCREENING FOR ENDOCRINE, METABOLIC AND IMMUNITY DISORDER: ICD-10-CM

## 2020-01-21 DIAGNOSIS — Z13.0 SCREENING FOR DEFICIENCY ANEMIA: ICD-10-CM

## 2020-01-21 DIAGNOSIS — L65.9 HAIR LOSS: ICD-10-CM

## 2020-01-21 DIAGNOSIS — E66.3 OVERWEIGHT (BMI 25.0-29.9): ICD-10-CM

## 2020-01-21 DIAGNOSIS — M85.89 OSTEOPENIA OF MULTIPLE SITES: ICD-10-CM

## 2020-01-21 DIAGNOSIS — Z78.0 POST-MENOPAUSAL: ICD-10-CM

## 2020-01-21 DIAGNOSIS — K21.9 GASTROESOPHAGEAL REFLUX DISEASE, ESOPHAGITIS PRESENCE NOT SPECIFIED: ICD-10-CM

## 2020-01-21 DIAGNOSIS — G89.29 CHRONIC LEFT SI JOINT PAIN: ICD-10-CM

## 2020-01-21 DIAGNOSIS — M53.3 CHRONIC LEFT SI JOINT PAIN: ICD-10-CM

## 2020-01-21 DIAGNOSIS — Z13.228 SCREENING FOR ENDOCRINE, METABOLIC AND IMMUNITY DISORDER: ICD-10-CM

## 2020-01-21 DIAGNOSIS — M54.42 ACUTE LEFT-SIDED LOW BACK PAIN WITH LEFT-SIDED SCIATICA: ICD-10-CM

## 2020-01-21 DIAGNOSIS — L71.9 ROSACEA: ICD-10-CM

## 2020-01-21 DIAGNOSIS — G89.29 CHRONIC LEFT SI JOINT PAIN: Primary | ICD-10-CM

## 2020-01-21 DIAGNOSIS — Z13.29 SCREENING FOR ENDOCRINE, METABOLIC AND IMMUNITY DISORDER: ICD-10-CM

## 2020-01-21 DIAGNOSIS — E55.9 VITAMIN D DEFICIENCY: ICD-10-CM

## 2020-01-21 DIAGNOSIS — M54.42 CHRONIC LEFT-SIDED LOW BACK PAIN WITH LEFT-SIDED SCIATICA: ICD-10-CM

## 2020-01-21 DIAGNOSIS — D21.9 FIBROIDS: ICD-10-CM

## 2020-01-21 DIAGNOSIS — Z71.85 VACCINE COUNSELING: ICD-10-CM

## 2020-01-21 DIAGNOSIS — M51.36 DISC DEGENERATION, LUMBAR: ICD-10-CM

## 2020-01-21 DIAGNOSIS — T14.8XXA MUSCLE STRAIN: ICD-10-CM

## 2020-01-21 DIAGNOSIS — M53.3 SACROCOCCYGEAL DISORDERS, NOT ELSEWHERE CLASSIFIED: ICD-10-CM

## 2020-01-21 DIAGNOSIS — M53.3 CHRONIC LEFT SI JOINT PAIN: Primary | ICD-10-CM

## 2020-01-21 DIAGNOSIS — G89.29 CHRONIC LEFT-SIDED LOW BACK PAIN WITH LEFT-SIDED SCIATICA: ICD-10-CM

## 2020-01-21 DIAGNOSIS — Z00.00 LABORATORY EXAMINATION ORDERED AS PART OF A ROUTINE GENERAL MEDICAL EXAMINATION: ICD-10-CM

## 2020-01-21 DIAGNOSIS — G43.019 INTRACTABLE MIGRAINE WITHOUT AURA AND WITHOUT STATUS MIGRAINOSUS: ICD-10-CM

## 2020-01-21 DIAGNOSIS — I05.9 MITRAL VALVE DISORDER: ICD-10-CM

## 2020-01-21 DIAGNOSIS — R23.2 HOT FLASHES: ICD-10-CM

## 2020-01-21 LAB
ALBUMIN SERPL-MCNC: 3.9 G/DL (ref 3.4–5)
ALBUMIN/GLOB SERPL: 1 {RATIO} (ref 1–2)
ALP LIVER SERPL-CCNC: 52 U/L (ref 46–118)
ALT SERPL-CCNC: 30 U/L (ref 13–56)
ANION GAP SERPL CALC-SCNC: 6 MMOL/L (ref 0–18)
AST SERPL-CCNC: 27 U/L (ref 15–37)
BASOPHILS # BLD AUTO: 0.03 X10(3) UL (ref 0–0.2)
BASOPHILS NFR BLD AUTO: 0.5 %
BILIRUB SERPL-MCNC: 0.4 MG/DL (ref 0.1–2)
BUN BLD-MCNC: 11 MG/DL (ref 7–18)
BUN/CREAT SERPL: 11.3 (ref 10–20)
CALCIUM BLD-MCNC: 9.6 MG/DL (ref 8.5–10.1)
CHLORIDE SERPL-SCNC: 107 MMOL/L (ref 98–112)
CHOLEST SMN-MCNC: 253 MG/DL (ref ?–200)
CO2 SERPL-SCNC: 28 MMOL/L (ref 21–32)
CREAT BLD-MCNC: 0.97 MG/DL (ref 0.55–1.02)
DEPRECATED RDW RBC AUTO: 51.8 FL (ref 35.1–46.3)
EOSINOPHIL # BLD AUTO: 0.13 X10(3) UL (ref 0–0.7)
EOSINOPHIL NFR BLD AUTO: 2.1 %
ERYTHROCYTE [DISTWIDTH] IN BLOOD BY AUTOMATED COUNT: 15.9 % (ref 11–15)
GLOBULIN PLAS-MCNC: 4.1 G/DL (ref 2.8–4.4)
GLUCOSE BLD-MCNC: 95 MG/DL (ref 70–99)
HCT VFR BLD AUTO: 38.7 % (ref 35–48)
HDLC SERPL-MCNC: 87 MG/DL (ref 40–59)
HGB BLD-MCNC: 12.4 G/DL (ref 12–16)
IMM GRANULOCYTES # BLD AUTO: 0.01 X10(3) UL (ref 0–1)
IMM GRANULOCYTES NFR BLD: 0.2 %
LDLC SERPL CALC-MCNC: 129 MG/DL (ref ?–100)
LYMPHOCYTES # BLD AUTO: 1.48 X10(3) UL (ref 1–4)
LYMPHOCYTES NFR BLD AUTO: 23.9 %
M PROTEIN MFR SERPL ELPH: 8 G/DL (ref 6.4–8.2)
MCH RBC QN AUTO: 28.6 PG (ref 26–34)
MCHC RBC AUTO-ENTMCNC: 32 G/DL (ref 31–37)
MCV RBC AUTO: 89.4 FL (ref 80–100)
MONOCYTES # BLD AUTO: 0.38 X10(3) UL (ref 0.1–1)
MONOCYTES NFR BLD AUTO: 6.1 %
NEUTROPHILS # BLD AUTO: 4.17 X10 (3) UL (ref 1.5–7.7)
NEUTROPHILS # BLD AUTO: 4.17 X10(3) UL (ref 1.5–7.7)
NEUTROPHILS NFR BLD AUTO: 67.2 %
NONHDLC SERPL-MCNC: 166 MG/DL (ref ?–130)
OSMOLALITY SERPL CALC.SUM OF ELEC: 291 MOSM/KG (ref 275–295)
PATIENT FASTING Y/N/NP: YES
PATIENT FASTING Y/N/NP: YES
PLATELET # BLD AUTO: 389 10(3)UL (ref 150–450)
POTASSIUM SERPL-SCNC: 4 MMOL/L (ref 3.5–5.1)
RBC # BLD AUTO: 4.33 X10(6)UL (ref 3.8–5.3)
RUBV IGG SER QL: POSITIVE
RUBV IGG SER-ACNC: 142.7 IU/ML (ref 10–?)
SODIUM SERPL-SCNC: 141 MMOL/L (ref 136–145)
TRIGL SERPL-MCNC: 184 MG/DL (ref 30–149)
TSI SER-ACNC: 2.22 MIU/ML (ref 0.36–3.74)
VIT D+METAB SERPL-MCNC: 54.6 NG/ML (ref 30–100)
VLDLC SERPL CALC-MCNC: 37 MG/DL (ref 0–30)
WBC # BLD AUTO: 6.2 X10(3) UL (ref 4–11)

## 2020-01-21 PROCEDURE — 80050 GENERAL HEALTH PANEL: CPT | Performed by: FAMILY MEDICINE

## 2020-01-21 PROCEDURE — 82306 VITAMIN D 25 HYDROXY: CPT | Performed by: FAMILY MEDICINE

## 2020-01-21 PROCEDURE — 99214 OFFICE O/P EST MOD 30 MIN: CPT | Performed by: FAMILY MEDICINE

## 2020-01-21 PROCEDURE — 86765 RUBEOLA ANTIBODY: CPT | Performed by: FAMILY MEDICINE

## 2020-01-21 PROCEDURE — 86735 MUMPS ANTIBODY: CPT | Performed by: FAMILY MEDICINE

## 2020-01-21 PROCEDURE — 80061 LIPID PANEL: CPT | Performed by: FAMILY MEDICINE

## 2020-01-21 PROCEDURE — 72100 X-RAY EXAM L-S SPINE 2/3 VWS: CPT | Performed by: FAMILY MEDICINE

## 2020-01-21 PROCEDURE — 86762 RUBELLA ANTIBODY: CPT | Performed by: FAMILY MEDICINE

## 2020-01-21 PROCEDURE — 36415 COLL VENOUS BLD VENIPUNCTURE: CPT | Performed by: FAMILY MEDICINE

## 2020-01-21 NOTE — PROGRESS NOTES
Zachary Magaña is a 64year old female. HPI:   Pt. Is here for a med check. Pt. Complains of left low back, hip and leg pain. Noticed it about 2-3 months . It comes and goes. Noticed if after Thanksgiving.   thoughti it was alendronate, but then no MG Oral Tab TAKE 1 TABLET EVERY 2 HOURS AS NEEDED 27 tablet 0   • Adapalene 0.1 % External Gel Apply topically nightly. • Doxycycline Monohydrate 50 MG Oral Cap Take 50 mg by mouth daily.      • Sulfacetamide Sodium 10 % External Liquid   3   • Multiple Result Value Ref Range    Hepatitis C Virus Nonreactive  Nonreactive        REVIEW OF SYSTEMS:   GENERAL: feels well otherwise  SKIN: eczema and rosacea; eyebrow thinning  EYES:denies blurred vision or double vision  HEENT:  nasal congestion, sinus pain loss  Screening for endocrine, metabolic and immunity disorder  Screening for deficiency anemia  Laboratory examination ordered as part of a routine general medical examination  Acute left-sided low back pain with left-sided sciatica  Chronic left si joint plan.  The patient is asked to return in 8 to 12 weeks for follow-up on PT.

## 2020-01-22 LAB
MEV IGG SER-ACNC: 293 AU/ML (ref 16.5–?)
MUV IGG SER IA-ACNC: >300 AU/ML (ref 11–?)

## 2020-01-27 DIAGNOSIS — E78.5 DYSLIPIDEMIA: Primary | ICD-10-CM

## 2020-03-02 ENCOUNTER — OFFICE VISIT (OUTPATIENT)
Dept: PHYSICAL THERAPY | Age: 60
End: 2020-03-02
Attending: FAMILY MEDICINE
Payer: COMMERCIAL

## 2020-03-02 DIAGNOSIS — M53.3 CHRONIC LEFT SI JOINT PAIN: ICD-10-CM

## 2020-03-02 DIAGNOSIS — G89.29 CHRONIC LEFT SI JOINT PAIN: ICD-10-CM

## 2020-03-02 DIAGNOSIS — M54.42 ACUTE LEFT-SIDED LOW BACK PAIN WITH LEFT-SIDED SCIATICA: ICD-10-CM

## 2020-03-02 PROCEDURE — 97161 PT EVAL LOW COMPLEX 20 MIN: CPT

## 2020-03-02 PROCEDURE — 97110 THERAPEUTIC EXERCISES: CPT

## 2020-03-02 NOTE — PROGRESS NOTES
SPINE EVALUATION:   Referring Physician: Dr. Rios Mail  Diagnosis: Acute left sided low back pain with sciatica     Date of Service: 3/2/2020     PATIENT SUMMARY   Wade Buck is a 64year old female who presents to therapy today with complaints of b thoracic kyphosis  Neuro Screen: Normal    Lumbar spine AROM: (* denotes performed with pain)  Flexion: 80 deg with flexing at the hip  Extension: 15 deg*  Sidebending: R 20 deg; L 20 deg  Rotation: R 40 deg; L 40 deg*    Accessory motion: Hypomobility thr self management of LBP, be able to abolish L LE pain    Frequency / Duration: Patient will be seen for 1-2 x/week or a total of 8 visits over a 90 day period.  Treatment will include: Manual Therapy, Neuromuscular Re-education, Therapeutic Exercise and Home

## 2020-03-04 ENCOUNTER — APPOINTMENT (OUTPATIENT)
Dept: PHYSICAL THERAPY | Age: 60
End: 2020-03-04
Attending: FAMILY MEDICINE
Payer: COMMERCIAL

## 2020-03-09 ENCOUNTER — OFFICE VISIT (OUTPATIENT)
Dept: PHYSICAL THERAPY | Age: 60
End: 2020-03-09
Attending: FAMILY MEDICINE
Payer: COMMERCIAL

## 2020-03-09 ENCOUNTER — ORDER TRANSCRIPTION (OUTPATIENT)
Dept: ADMINISTRATIVE | Facility: HOSPITAL | Age: 60
End: 2020-03-09

## 2020-03-09 ENCOUNTER — HOSPITAL ENCOUNTER (OUTPATIENT)
Dept: CT IMAGING | Facility: HOSPITAL | Age: 60
Discharge: HOME | End: 2020-03-09
Attending: FAMILY MEDICINE

## 2020-03-09 DIAGNOSIS — Z13.9 SPECIAL SCREENING: ICD-10-CM

## 2020-03-09 DIAGNOSIS — Z13.9 ENCOUNTER FOR SCREENING: Primary | ICD-10-CM

## 2020-03-09 DIAGNOSIS — Z13.6 SCREENING FOR CARDIOVASCULAR CONDITION: ICD-10-CM

## 2020-03-09 PROCEDURE — 97140 MANUAL THERAPY 1/> REGIONS: CPT

## 2020-03-09 PROCEDURE — 97110 THERAPEUTIC EXERCISES: CPT

## 2020-03-09 NOTE — PROGRESS NOTES
Diagnosis: Acute left sided LBP with Sciatica    Precautions: Pars defect L5/S1  Insurance Type (# Auth): BCBS (8) Total Timed Treatment: 45 min     Total Treatment time: 45 min       Charges: EX 2, MT 1  Treatment Number: 2/8  Subjective: Pain remains abo per week for the lumbar spine. HEP instruction, pt education.

## 2020-03-11 ENCOUNTER — APPOINTMENT (OUTPATIENT)
Dept: PHYSICAL THERAPY | Age: 60
End: 2020-03-11
Attending: FAMILY MEDICINE
Payer: COMMERCIAL

## 2020-03-13 RX ORDER — ATORVASTATIN CALCIUM 20 MG/1
TABLET, FILM COATED ORAL
Qty: 90 TABLET | Refills: 0 | Status: SHIPPED | OUTPATIENT
Start: 2020-03-13 | End: 2020-04-14

## 2020-03-13 RX ORDER — LISINOPRIL 10 MG/1
TABLET ORAL
Qty: 90 TABLET | Refills: 0 | Status: SHIPPED | OUTPATIENT
Start: 2020-03-13 | End: 2020-04-14

## 2020-03-16 ENCOUNTER — APPOINTMENT (OUTPATIENT)
Dept: PHYSICAL THERAPY | Age: 60
End: 2020-03-16
Attending: FAMILY MEDICINE
Payer: COMMERCIAL

## 2020-03-18 ENCOUNTER — APPOINTMENT (OUTPATIENT)
Dept: PHYSICAL THERAPY | Age: 60
End: 2020-03-18
Attending: FAMILY MEDICINE
Payer: COMMERCIAL

## 2020-04-08 ENCOUNTER — APPOINTMENT (OUTPATIENT)
Dept: PHYSICAL THERAPY | Age: 60
End: 2020-04-08
Attending: FAMILY MEDICINE
Payer: COMMERCIAL

## 2020-04-14 RX ORDER — ATORVASTATIN CALCIUM 20 MG/1
TABLET, FILM COATED ORAL
Qty: 90 TABLET | Refills: 0 | Status: SHIPPED | OUTPATIENT
Start: 2020-04-14 | End: 2020-06-27

## 2020-04-14 RX ORDER — LISINOPRIL 10 MG/1
TABLET ORAL
Qty: 90 TABLET | Refills: 0 | Status: SHIPPED | OUTPATIENT
Start: 2020-04-14 | End: 2020-06-27

## 2020-06-28 RX ORDER — ATORVASTATIN CALCIUM 20 MG/1
20 TABLET, FILM COATED ORAL NIGHTLY
Qty: 90 TABLET | Refills: 0 | Status: SHIPPED | OUTPATIENT
Start: 2020-06-28 | End: 2020-09-25

## 2020-06-28 RX ORDER — LISINOPRIL 10 MG/1
10 TABLET ORAL DAILY
Qty: 90 TABLET | Refills: 0 | Status: SHIPPED | OUTPATIENT
Start: 2020-06-28 | End: 2020-09-25

## 2020-07-21 ENCOUNTER — TELEPHONE (OUTPATIENT)
Dept: FAMILY MEDICINE CLINIC | Facility: CLINIC | Age: 60
End: 2020-07-21

## 2020-07-21 ENCOUNTER — PATIENT MESSAGE (OUTPATIENT)
Dept: FAMILY MEDICINE CLINIC | Facility: CLINIC | Age: 60
End: 2020-07-21

## 2020-07-21 ENCOUNTER — LAB ENCOUNTER (OUTPATIENT)
Dept: LAB | Age: 60
End: 2020-07-21
Attending: FAMILY MEDICINE
Payer: COMMERCIAL

## 2020-07-21 ENCOUNTER — OFFICE VISIT (OUTPATIENT)
Dept: FAMILY MEDICINE CLINIC | Facility: CLINIC | Age: 60
End: 2020-07-21
Payer: COMMERCIAL

## 2020-07-21 VITALS
HEART RATE: 84 BPM | BODY MASS INDEX: 23.37 KG/M2 | SYSTOLIC BLOOD PRESSURE: 110 MMHG | HEIGHT: 68.5 IN | RESPIRATION RATE: 14 BRPM | DIASTOLIC BLOOD PRESSURE: 70 MMHG | TEMPERATURE: 98 F | WEIGHT: 156 LBS

## 2020-07-21 DIAGNOSIS — Z13.89 SCREENING FOR GENITOURINARY CONDITION: ICD-10-CM

## 2020-07-21 DIAGNOSIS — Z01.810 PREPROCEDURAL CARDIOVASCULAR EXAMINATION: ICD-10-CM

## 2020-07-21 DIAGNOSIS — R93.1 ABNORMAL CT SCAN OF HEART: ICD-10-CM

## 2020-07-21 DIAGNOSIS — Z00.00 LABORATORY EXAMINATION ORDERED AS PART OF A ROUTINE GENERAL MEDICAL EXAMINATION: ICD-10-CM

## 2020-07-21 DIAGNOSIS — Z79.899 MEDICATION MANAGEMENT: ICD-10-CM

## 2020-07-21 DIAGNOSIS — E78.5 DYSLIPIDEMIA: ICD-10-CM

## 2020-07-21 DIAGNOSIS — Z13.820 SCREENING FOR OSTEOPOROSIS: ICD-10-CM

## 2020-07-21 DIAGNOSIS — Z00.00 ROUTINE GENERAL MEDICAL EXAMINATION AT A HEALTH CARE FACILITY: Primary | ICD-10-CM

## 2020-07-21 DIAGNOSIS — Z12.31 ENCOUNTER FOR SCREENING MAMMOGRAM FOR MALIGNANT NEOPLASM OF BREAST: ICD-10-CM

## 2020-07-21 LAB
ALBUMIN SERPL-MCNC: 4.2 G/DL (ref 3.4–5)
ALBUMIN/GLOB SERPL: 1.1 {RATIO} (ref 1–2)
ALP LIVER SERPL-CCNC: 52 U/L (ref 46–118)
ALT SERPL-CCNC: 32 U/L (ref 13–56)
ANION GAP SERPL CALC-SCNC: 4 MMOL/L (ref 0–18)
AST SERPL-CCNC: 29 U/L (ref 15–37)
BILIRUB SERPL-MCNC: 0.5 MG/DL (ref 0.1–2)
BILIRUB UR QL STRIP.AUTO: NEGATIVE
BUN BLD-MCNC: 12 MG/DL (ref 7–18)
BUN/CREAT SERPL: 12.8 (ref 10–20)
CALCIUM BLD-MCNC: 9.2 MG/DL (ref 8.5–10.1)
CHLORIDE SERPL-SCNC: 103 MMOL/L (ref 98–112)
CHOLEST SMN-MCNC: 218 MG/DL (ref ?–200)
CLARITY UR REFRACT.AUTO: CLEAR
CO2 SERPL-SCNC: 30 MMOL/L (ref 21–32)
COLOR UR AUTO: YELLOW
CREAT BLD-MCNC: 0.94 MG/DL (ref 0.55–1.02)
GLOBULIN PLAS-MCNC: 3.7 G/DL (ref 2.8–4.4)
GLUCOSE BLD-MCNC: 88 MG/DL (ref 70–99)
GLUCOSE UR STRIP.AUTO-MCNC: NEGATIVE MG/DL
HDLC SERPL-MCNC: 70 MG/DL (ref 40–59)
KETONES UR STRIP.AUTO-MCNC: NEGATIVE MG/DL
LDLC SERPL CALC-MCNC: 119 MG/DL (ref ?–100)
LEUKOCYTE ESTERASE UR QL STRIP.AUTO: NEGATIVE
M PROTEIN MFR SERPL ELPH: 7.9 G/DL (ref 6.4–8.2)
NITRITE UR QL STRIP.AUTO: NEGATIVE
NONHDLC SERPL-MCNC: 148 MG/DL (ref ?–130)
OSMOLALITY SERPL CALC.SUM OF ELEC: 283 MOSM/KG (ref 275–295)
PATIENT FASTING Y/N/NP: YES
PATIENT FASTING Y/N/NP: YES
PH UR STRIP.AUTO: 7 [PH] (ref 4.5–8)
POTASSIUM SERPL-SCNC: 4 MMOL/L (ref 3.5–5.1)
PROT UR STRIP.AUTO-MCNC: NEGATIVE MG/DL
RBC UR QL AUTO: NEGATIVE
SODIUM SERPL-SCNC: 137 MMOL/L (ref 136–145)
SP GR UR STRIP.AUTO: 1.01 (ref 1–1.03)
TRIGL SERPL-MCNC: 144 MG/DL (ref 30–149)
UROBILINOGEN UR STRIP.AUTO-MCNC: <2 MG/DL
VLDLC SERPL CALC-MCNC: 29 MG/DL (ref 0–30)

## 2020-07-21 PROCEDURE — 3074F SYST BP LT 130 MM HG: CPT | Performed by: FAMILY MEDICINE

## 2020-07-21 PROCEDURE — 99396 PREV VISIT EST AGE 40-64: CPT | Performed by: FAMILY MEDICINE

## 2020-07-21 PROCEDURE — 81003 URINALYSIS AUTO W/O SCOPE: CPT | Performed by: FAMILY MEDICINE

## 2020-07-21 PROCEDURE — 3078F DIAST BP <80 MM HG: CPT | Performed by: FAMILY MEDICINE

## 2020-07-21 PROCEDURE — 3008F BODY MASS INDEX DOCD: CPT | Performed by: FAMILY MEDICINE

## 2020-07-21 PROCEDURE — 80061 LIPID PANEL: CPT | Performed by: FAMILY MEDICINE

## 2020-07-21 PROCEDURE — 36415 COLL VENOUS BLD VENIPUNCTURE: CPT | Performed by: FAMILY MEDICINE

## 2020-07-21 PROCEDURE — 80053 COMPREHEN METABOLIC PANEL: CPT | Performed by: FAMILY MEDICINE

## 2020-07-21 RX ORDER — LORATADINE 10 MG/1
10 TABLET ORAL DAILY
COMMUNITY
End: 2021-09-16

## 2020-07-21 NOTE — H&P
CC: Annual Physical Exam    HPI:   Flaquito Swift is a 64year old female who presents for a complete physical exam. Symptoms: is menopausal. Patient complains of nothing.       Wt Readings from Last 6 Encounters:  07/21/20 : 156 lb (70.8 kg)  01/21/20 Range    Rubella IgG Positive Positive    Rubella IgG Quantitative [id number] >=10 IU/mL   CBC W/ DIFFERENTIAL   Result Value Ref Range    WBC 6.2 4.0 - 11.0 x10(3) uL    RBC 4.33 3.80 - 5.30 x10(6)uL    HGB 12.4 12.0 - 16.0 g/dL    HCT 38.7 35.0 - 48.0 % Apply topically nightly. • Sulfacetamide Sodium 10 % External Liquid   3   • Multiple Vitamins-Minerals (ONE-A-DAY WOMENS 50 PLUS OR) Take  by mouth.         No Known Allergies   Past Medical History:   Diagnosis Date   • Abdominal hernia    • Esophagea of beer per week      Frequency: 2-3 times a week    Drug use: No    Occ: retired. : y. Children: 2. Exercise: three times per week.   Diet: watches fats closely, watches sugar closely and watches calories closely     REVIEW OF SYSTEMS:   GENERAL: Luis Easley is a 64year old female who presents for a complete physical exam.   Pap and pelvic not done. Self breast exam explained.    Health maintenance, will check: Orders Placed This Encounter      COMP METABOLIC PANEL      UA/M WITH CULTURE REFLEX [302

## 2020-07-21 NOTE — TELEPHONE ENCOUNTER
From: Zachary Magaña  To: Chana Dickens DO  Sent: 7/21/2020 12:48 PM CDT  Subject: Visit Follow-up Question    I found this info. on my chart. I had to cut the report to get under 750 characters. I don't know why you couldn't see it.  Radha  DATE OF SER

## 2020-07-22 ENCOUNTER — HOSPITAL ENCOUNTER (OUTPATIENT)
Dept: BONE DENSITY | Age: 60
Discharge: HOME | End: 2020-07-22
Attending: FAMILY MEDICINE
Payer: COMMERCIAL

## 2020-07-22 DIAGNOSIS — Z13.820 SCREENING FOR OSTEOPOROSIS: ICD-10-CM

## 2020-07-22 PROCEDURE — 77080 DXA BONE DENSITY AXIAL: CPT | Performed by: FAMILY MEDICINE

## 2020-07-23 ENCOUNTER — LAB ENCOUNTER (OUTPATIENT)
Dept: LAB | Facility: HOSPITAL | Age: 60
End: 2020-07-23
Attending: FAMILY MEDICINE
Payer: COMMERCIAL

## 2020-07-23 DIAGNOSIS — R93.1 ABNORMAL CT SCAN OF HEART: ICD-10-CM

## 2020-07-23 DIAGNOSIS — Z01.810 PREPROCEDURAL CARDIOVASCULAR EXAMINATION: ICD-10-CM

## 2020-07-23 DIAGNOSIS — Z11.59 ENCOUNTER FOR SCREENING FOR OTHER VIRAL DISEASES: ICD-10-CM

## 2020-07-23 DIAGNOSIS — Z01.818 OTHER SPECIFIED PRE-OPERATIVE EXAMINATION: ICD-10-CM

## 2020-07-24 LAB — SARS-COV-2 RNA RESP QL NAA+PROBE: NOT DETECTED

## 2020-07-25 ENCOUNTER — HOSPITAL ENCOUNTER (OUTPATIENT)
Dept: CV DIAGNOSTICS | Facility: HOSPITAL | Age: 60
Discharge: HOME | End: 2020-07-25
Attending: FAMILY MEDICINE
Payer: COMMERCIAL

## 2020-07-25 DIAGNOSIS — R93.1 ABNORMAL CT SCAN OF HEART: ICD-10-CM

## 2020-07-25 PROCEDURE — 93017 CV STRESS TEST TRACING ONLY: CPT | Performed by: FAMILY MEDICINE

## 2020-07-25 PROCEDURE — 93018 CV STRESS TEST I&R ONLY: CPT | Performed by: FAMILY MEDICINE

## 2020-08-22 ENCOUNTER — OFFICE VISIT (OUTPATIENT)
Dept: FAMILY MEDICINE CLINIC | Facility: CLINIC | Age: 60
End: 2020-08-22
Payer: COMMERCIAL

## 2020-08-22 VITALS
RESPIRATION RATE: 18 BRPM | OXYGEN SATURATION: 98 % | BODY MASS INDEX: 22.96 KG/M2 | DIASTOLIC BLOOD PRESSURE: 70 MMHG | TEMPERATURE: 98 F | HEIGHT: 69 IN | SYSTOLIC BLOOD PRESSURE: 136 MMHG | WEIGHT: 155 LBS | HEART RATE: 91 BPM

## 2020-08-22 DIAGNOSIS — S80.861A INSECT BITE OF RIGHT LOWER LEG, INITIAL ENCOUNTER: ICD-10-CM

## 2020-08-22 DIAGNOSIS — L08.9 SKIN INFECTION: Primary | ICD-10-CM
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
## 2020-08-22 PROCEDURE — 3075F SYST BP GE 130 - 139MM HG: CPT | Performed by: NURSE PRACTITIONER

## 2020-08-22 PROCEDURE — 99213 OFFICE O/P EST LOW 20 MIN: CPT | Performed by: NURSE PRACTITIONER

## 2020-08-22 PROCEDURE — 3078F DIAST BP <80 MM HG: CPT | Performed by: NURSE PRACTITIONER

## 2020-08-22 PROCEDURE — 3008F BODY MASS INDEX DOCD: CPT | Performed by: NURSE PRACTITIONER

## 2020-08-22 RX ORDER — AMOXICILLIN AND CLAVULANATE POTASSIUM 875; 125 MG/1; MG/1
1 TABLET, FILM COATED ORAL 2 TIMES DAILY
Qty: 20 TABLET | Refills: 0 | Status: SHIPPED | OUTPATIENT
Start: 2020-08-22 | End: 2020-09-01

## 2020-08-22 NOTE — PROGRESS NOTES
CHIEF COMPLAINT:   Patient presents with:  Rash         HPI:    Marianela Rueda is a 64year old female who presents for evaluation of an insect bite to right foot/lower leg. Pt is concerned it may have become infected.  She notes she was bit by a flyin • Multiple Vitamins-Minerals (ONE-A-DAY WOMENS 50 PLUS OR) Take  by mouth.         Past Medical History:   Diagnosis Date   • Abdominal hernia    • Esophageal reflux    • High cholesterol    • Hypercholesterolemia    • Migraines    • Other psoriasis    • Ro GENERAL: feels well otherwise, no fever, no chills. SKIN: Insect bite with redness worsening to right lower leg. No edema. No ulcerations. HEENT: Denies rhinorrhea, edema of the lips or swelling of throat.   CARDIOVASCULAR: Denies chest pains or palpitati Signed Prescriptions Disp Refills   • Amoxicillin-Pot Clavulanate (AUGMENTIN) 875-125 MG Oral Tab 20 tablet 0     Sig: Take 1 tablet by mouth 2 (two) times daily for 10 days.        Area of erythema outlined with skin pen.     Discussed physical exam and hp · If a stinger is still in your skin, it will need to be removed. Don't use tweezers that might push more venom into the skin. Gently scrape the stinger from the side with a firm object such as the side of a credit card.  This will loosen it and remove it f · Increasing pain, redness, swelling or drainage  · Headache, fever, chills, muscle or joint aching, or vomiting,  · New rash  Casey last reviewed this educational content on 10/1/2019  © 2997-4168 [employer].  [employer] [address] [place] [address]

## 2020-08-22 NOTE — PATIENT INSTRUCTIONS
1. Rest. Keep area clean. 2. Augmentin as prescribed. 3. Supportive care and otc antihistamine as directed.   4. Follow up with PMD in 2-3 days for reeval. Follow up sooner or go to the emergency department immediately if symptoms worsen, change, you dev good choice for daytime use. · If oral antibiotics have been prescribed, be sure to take them as directed until they are all finished. · You may use over-the-counter pain medicine to control pain, unless another pain medicine was prescribed.  Talk with azeem

## 2020-09-25 RX ORDER — ATORVASTATIN CALCIUM 20 MG/1
TABLET, FILM COATED ORAL
Qty: 90 TABLET | Refills: 1 | Status: SHIPPED | OUTPATIENT
Start: 2020-09-25 | End: 2021-04-23

## 2020-09-25 RX ORDER — LISINOPRIL 10 MG/1
TABLET ORAL
Qty: 90 TABLET | Refills: 1 | Status: SHIPPED | OUTPATIENT
Start: 2020-09-25 | End: 2021-01-22 | Stop reason: DRUGHIGH

## 2020-10-20 ENCOUNTER — PATIENT MESSAGE (OUTPATIENT)
Dept: FAMILY MEDICINE CLINIC | Facility: CLINIC | Age: 60
End: 2020-10-20

## 2020-10-20 NOTE — TELEPHONE ENCOUNTER
From: Elvia Hogan  To: Shruti An DO  Sent: 10/20/2020 10:05 AM CDT  Subject: Non-Urgent Medical Question    Hi,  At my last appointment, we discussed the shingles shot, but I didn't get it. I noticed that [facility] offers this vaccination.  [place]oul

## 2021-01-22 ENCOUNTER — LAB ENCOUNTER (OUTPATIENT)
Dept: LAB | Facility: HOSPITAL | Age: 61
End: 2021-01-22
Attending: FAMILY MEDICINE
Payer: COMMERCIAL

## 2021-01-22 ENCOUNTER — OFFICE VISIT (OUTPATIENT)
Dept: FAMILY MEDICINE CLINIC | Facility: CLINIC | Age: 61
End: 2021-01-22
Payer: COMMERCIAL

## 2021-01-22 VITALS
WEIGHT: 153 LBS | HEIGHT: 69 IN | DIASTOLIC BLOOD PRESSURE: 70 MMHG | TEMPERATURE: 97 F | RESPIRATION RATE: 16 BRPM | HEART RATE: 84 BPM | SYSTOLIC BLOOD PRESSURE: 100 MMHG | BODY MASS INDEX: 22.66 KG/M2

## 2021-01-22 DIAGNOSIS — M51.36 DDD (DEGENERATIVE DISC DISEASE), LUMBAR: ICD-10-CM

## 2021-01-22 DIAGNOSIS — M54.50 CHRONIC BILATERAL LOW BACK PAIN WITHOUT SCIATICA: ICD-10-CM

## 2021-01-22 DIAGNOSIS — G43.009 MIGRAINE WITHOUT AURA AND WITHOUT STATUS MIGRAINOSUS, NOT INTRACTABLE: ICD-10-CM

## 2021-01-22 DIAGNOSIS — Z00.00 LABORATORY EXAMINATION ORDERED AS PART OF A ROUTINE GENERAL MEDICAL EXAMINATION: ICD-10-CM

## 2021-01-22 DIAGNOSIS — D21.9 FIBROIDS: ICD-10-CM

## 2021-01-22 DIAGNOSIS — Z79.899 MEDICATION MANAGEMENT: ICD-10-CM

## 2021-01-22 DIAGNOSIS — R91.1 LUNG NODULE < 6CM ON CT: ICD-10-CM

## 2021-01-22 DIAGNOSIS — L71.9 ROSACEA: ICD-10-CM

## 2021-01-22 DIAGNOSIS — E55.9 VITAMIN D DEFICIENCY: ICD-10-CM

## 2021-01-22 DIAGNOSIS — E78.5 DYSLIPIDEMIA: ICD-10-CM

## 2021-01-22 DIAGNOSIS — I10 ESSENTIAL HYPERTENSION, BENIGN: Primary | ICD-10-CM

## 2021-01-22 DIAGNOSIS — I05.9 MITRAL VALVE DISORDER: ICD-10-CM

## 2021-01-22 DIAGNOSIS — Z71.85 VACCINE COUNSELING: ICD-10-CM

## 2021-01-22 DIAGNOSIS — M85.89 OSTEOPENIA OF MULTIPLE SITES: ICD-10-CM

## 2021-01-22 DIAGNOSIS — L65.9 ALOPECIA OF SCALP: ICD-10-CM

## 2021-01-22 DIAGNOSIS — R14.0 GASSINESS: ICD-10-CM

## 2021-01-22 DIAGNOSIS — G89.29 CHRONIC BILATERAL LOW BACK PAIN WITHOUT SCIATICA: ICD-10-CM

## 2021-01-22 DIAGNOSIS — R93.1 ABNORMAL CT SCAN OF HEART: ICD-10-CM

## 2021-01-22 LAB
ALBUMIN SERPL-MCNC: 4.2 G/DL (ref 3.4–5)
ALBUMIN/GLOB SERPL: 1.2 {RATIO} (ref 1–2)
ALP LIVER SERPL-CCNC: 55 U/L
ALT SERPL-CCNC: 43 U/L
ANION GAP SERPL CALC-SCNC: 3 MMOL/L (ref 0–18)
AST SERPL-CCNC: 30 U/L (ref 15–37)
BASOPHILS # BLD AUTO: 0.03 X10(3) UL (ref 0–0.2)
BASOPHILS NFR BLD AUTO: 0.7 %
BILIRUB SERPL-MCNC: 0.3 MG/DL (ref 0.1–2)
BUN BLD-MCNC: 12 MG/DL (ref 7–18)
BUN/CREAT SERPL: 11.3 (ref 10–20)
CALCIUM BLD-MCNC: 8.6 MG/DL (ref 8.5–10.1)
CHLORIDE SERPL-SCNC: 107 MMOL/L (ref 98–112)
CHOLEST SMN-MCNC: 161 MG/DL (ref ?–200)
CO2 SERPL-SCNC: 30 MMOL/L (ref 21–32)
CREAT BLD-MCNC: 1.06 MG/DL
DEPRECATED RDW RBC AUTO: 43.9 FL (ref 35.1–46.3)
EOSINOPHIL # BLD AUTO: 0.09 X10(3) UL (ref 0–0.7)
EOSINOPHIL NFR BLD AUTO: 2.2 %
ERYTHROCYTE [DISTWIDTH] IN BLOOD BY AUTOMATED COUNT: 12.7 % (ref 11–15)
GLOBULIN PLAS-MCNC: 3.5 G/DL (ref 2.8–4.4)
GLUCOSE BLD-MCNC: 91 MG/DL (ref 70–99)
HCT VFR BLD AUTO: 38.2 %
HDLC SERPL-MCNC: 73 MG/DL (ref 40–59)
HGB BLD-MCNC: 12.9 G/DL
IMM GRANULOCYTES # BLD AUTO: 0 X10(3) UL (ref 0–1)
IMM GRANULOCYTES NFR BLD: 0 %
LDLC SERPL CALC-MCNC: 74 MG/DL (ref ?–100)
LYMPHOCYTES # BLD AUTO: 1.36 X10(3) UL (ref 1–4)
LYMPHOCYTES NFR BLD AUTO: 33.2 %
M PROTEIN MFR SERPL ELPH: 7.7 G/DL (ref 6.4–8.2)
MCH RBC QN AUTO: 31.8 PG (ref 26–34)
MCHC RBC AUTO-ENTMCNC: 33.8 G/DL (ref 31–37)
MCV RBC AUTO: 94.1 FL
MONOCYTES # BLD AUTO: 0.28 X10(3) UL (ref 0.1–1)
MONOCYTES NFR BLD AUTO: 6.8 %
NEUTROPHILS # BLD AUTO: 2.34 X10 (3) UL (ref 1.5–7.7)
NEUTROPHILS # BLD AUTO: 2.34 X10(3) UL (ref 1.5–7.7)
NEUTROPHILS NFR BLD AUTO: 57.1 %
NONHDLC SERPL-MCNC: 88 MG/DL (ref ?–130)
OSMOLALITY SERPL CALC.SUM OF ELEC: 289 MOSM/KG (ref 275–295)
PATIENT FASTING Y/N/NP: YES
PATIENT FASTING Y/N/NP: YES
PLATELET # BLD AUTO: 337 10(3)UL (ref 150–450)
POTASSIUM SERPL-SCNC: 3.8 MMOL/L (ref 3.5–5.1)
RBC # BLD AUTO: 4.06 X10(6)UL
SODIUM SERPL-SCNC: 140 MMOL/L (ref 136–145)
TRIGL SERPL-MCNC: 72 MG/DL (ref 30–149)
TSI SER-ACNC: 2.11 MIU/ML (ref 0.36–3.74)
VIT D+METAB SERPL-MCNC: 71.5 NG/ML (ref 30–100)
VLDLC SERPL CALC-MCNC: 14 MG/DL (ref 0–30)
WBC # BLD AUTO: 4.1 X10(3) UL (ref 4–11)

## 2021-01-22 PROCEDURE — 3078F DIAST BP <80 MM HG: CPT | Performed by: FAMILY MEDICINE

## 2021-01-22 PROCEDURE — 82306 VITAMIN D 25 HYDROXY: CPT

## 2021-01-22 PROCEDURE — 85025 COMPLETE CBC W/AUTO DIFF WBC: CPT

## 2021-01-22 PROCEDURE — 3074F SYST BP LT 130 MM HG: CPT | Performed by: FAMILY MEDICINE

## 2021-01-22 PROCEDURE — 99215 OFFICE O/P EST HI 40 MIN: CPT | Performed by: FAMILY MEDICINE

## 2021-01-22 PROCEDURE — 80053 COMPREHEN METABOLIC PANEL: CPT

## 2021-01-22 PROCEDURE — 84443 ASSAY THYROID STIM HORMONE: CPT

## 2021-01-22 PROCEDURE — 80061 LIPID PANEL: CPT

## 2021-01-22 PROCEDURE — 36415 COLL VENOUS BLD VENIPUNCTURE: CPT

## 2021-01-22 PROCEDURE — 3008F BODY MASS INDEX DOCD: CPT | Performed by: FAMILY MEDICINE

## 2021-01-22 RX ORDER — LISINOPRIL 5 MG/1
5 TABLET ORAL DAILY
Qty: 90 TABLET | Refills: 0 | Status: SHIPPED | OUTPATIENT
Start: 2021-01-22 | End: 2021-04-23

## 2021-01-22 RX ORDER — ADAPALENE 3 MG/G
GEL TOPICAL
COMMUNITY
Start: 2020-08-05

## 2021-01-22 NOTE — PROGRESS NOTES
Nirav Guzman is a 64year old female. HPI:   Pt. Is here for a med check.     HTN -- pt is taking lisinopril -- no side effects; does not check BP at home  Dyslipidemia -- pt is taking lipitor -- no side effects  Migraines -- stable; cpm  Rosacea -- High cholesterol    • Hypercholesterolemia    • Migraines    • Other psoriasis    • Rosacea    • Skin blushing/flushing    • Unspecified essential hypertension    • Wears glasses       Social History:  Social History    Tobacco Use      Smoking status: Nev cyanosis, clubbing or edema  MUSCULOSKELETAL: low back pain    ASSESSMENT AND PLAN:   Essential hypertension, benign  (primary encounter diagnosis)  Dyslipidemia  Osteopenia of multiple sites  Vitamin d deficiency  Abnormal ct scan of heart  Migraine witho

## 2021-01-29 ENCOUNTER — TELEPHONE (OUTPATIENT)
Dept: FAMILY MEDICINE CLINIC | Facility: CLINIC | Age: 61
End: 2021-01-29

## 2021-01-29 DIAGNOSIS — R93.429 ABNORMAL MRI, KIDNEY: Primary | ICD-10-CM

## 2021-01-29 NOTE — TELEPHONE ENCOUNTER
Spoke to patient about the MRI of LS spine. She is aware of the foraminal narrowing at L-5, S-1 area, contributing to her pain. She said right now she would like to stick with PT for now rather than see .  I told her if she changes her mind we can of

## 2021-02-02 ENCOUNTER — MED REC SCAN ONLY (OUTPATIENT)
Dept: FAMILY MEDICINE CLINIC | Facility: CLINIC | Age: 61
End: 2021-02-02

## 2021-03-16 ENCOUNTER — IMMUNIZATION (OUTPATIENT)
Dept: LAB | Age: 61
End: 2021-03-16
Attending: HOSPITALIST
Payer: COMMERCIAL

## 2021-03-16 DIAGNOSIS — Z23 NEED FOR VACCINATION: Primary | ICD-10-CM

## 2021-03-16 PROCEDURE — 0001A SARSCOV2 VAC 30MCG/0.3ML IM: CPT

## 2021-04-07 ENCOUNTER — IMMUNIZATION (OUTPATIENT)
Dept: LAB | Age: 61
End: 2021-04-07
Attending: HOSPITALIST
Payer: COMMERCIAL

## 2021-04-07 DIAGNOSIS — Z23 NEED FOR VACCINATION: Primary | ICD-10-CM

## 2021-04-07 PROCEDURE — 0002A SARSCOV2 VAC 30MCG/0.3ML IM: CPT

## 2021-04-23 ENCOUNTER — OFFICE VISIT (OUTPATIENT)
Dept: FAMILY MEDICINE CLINIC | Facility: CLINIC | Age: 61
End: 2021-04-23
Payer: COMMERCIAL

## 2021-04-23 VITALS
BODY MASS INDEX: 21.86 KG/M2 | DIASTOLIC BLOOD PRESSURE: 64 MMHG | WEIGHT: 151 LBS | HEART RATE: 74 BPM | SYSTOLIC BLOOD PRESSURE: 100 MMHG | HEIGHT: 69.5 IN | RESPIRATION RATE: 14 BRPM | TEMPERATURE: 98 F

## 2021-04-23 DIAGNOSIS — R14.0 GASSINESS: ICD-10-CM

## 2021-04-23 DIAGNOSIS — E78.5 DYSLIPIDEMIA: ICD-10-CM

## 2021-04-23 DIAGNOSIS — G43.009 MIGRAINE WITHOUT AURA AND WITHOUT STATUS MIGRAINOSUS, NOT INTRACTABLE: ICD-10-CM

## 2021-04-23 DIAGNOSIS — Z71.85 VACCINE COUNSELING: ICD-10-CM

## 2021-04-23 DIAGNOSIS — E04.2 MULTIPLE THYROID NODULES: ICD-10-CM

## 2021-04-23 DIAGNOSIS — Z12.11 SCREENING FOR COLON CANCER: ICD-10-CM

## 2021-04-23 DIAGNOSIS — E55.9 VITAMIN D DEFICIENCY: ICD-10-CM

## 2021-04-23 DIAGNOSIS — Z00.00 LABORATORY EXAMINATION ORDERED AS PART OF A ROUTINE GENERAL MEDICAL EXAMINATION: ICD-10-CM

## 2021-04-23 DIAGNOSIS — Z13.89 SCREENING FOR GENITOURINARY CONDITION: ICD-10-CM

## 2021-04-23 DIAGNOSIS — Z79.899 MEDICATION MANAGEMENT: ICD-10-CM

## 2021-04-23 DIAGNOSIS — L71.9 ROSACEA: ICD-10-CM

## 2021-04-23 DIAGNOSIS — M85.89 OSTEOPENIA OF MULTIPLE SITES: ICD-10-CM

## 2021-04-23 DIAGNOSIS — I10 ESSENTIAL HYPERTENSION, BENIGN: Primary | ICD-10-CM

## 2021-04-23 DIAGNOSIS — K21.9 GASTROESOPHAGEAL REFLUX DISEASE, UNSPECIFIED WHETHER ESOPHAGITIS PRESENT: ICD-10-CM

## 2021-04-23 DIAGNOSIS — R91.1 LUNG NODULE < 6CM ON CT: ICD-10-CM

## 2021-04-23 PROCEDURE — 99214 OFFICE O/P EST MOD 30 MIN: CPT | Performed by: FAMILY MEDICINE

## 2021-04-23 PROCEDURE — 3008F BODY MASS INDEX DOCD: CPT | Performed by: FAMILY MEDICINE

## 2021-04-23 PROCEDURE — 3078F DIAST BP <80 MM HG: CPT | Performed by: FAMILY MEDICINE

## 2021-04-23 PROCEDURE — 3074F SYST BP LT 130 MM HG: CPT | Performed by: FAMILY MEDICINE

## 2021-04-23 RX ORDER — LISINOPRIL 5 MG/1
5 TABLET ORAL DAILY
Qty: 90 TABLET | Refills: 1 | Status: SHIPPED | OUTPATIENT
Start: 2021-04-23 | End: 2021-10-14

## 2021-04-23 RX ORDER — ATORVASTATIN CALCIUM 20 MG/1
20 TABLET, FILM COATED ORAL NIGHTLY
Qty: 90 TABLET | Refills: 1 | Status: SHIPPED | OUTPATIENT
Start: 2021-04-23 | End: 2021-10-14

## 2021-04-23 RX ORDER — CLOBETASOL PROPIONATE 0.46 MG/ML
SOLUTION TOPICAL
COMMUNITY
Start: 2021-01-27

## 2021-04-23 NOTE — PROGRESS NOTES
Flaquito Swift is a 64year old female. HPI:   Pt. Is here for a med check.     HTN -- pt is taking lisinopril -- no side effects; does not check BP at home  Dyslipidemia -- pt is taking lipitor -- no side effects  Migraines -- stable; cpm; has not had • Rosacea    • Skin blushing/flushing    • Unspecified essential hypertension    • Wears glasses       Social History:  Social History    Tobacco Use      Smoking status: Never Smoker      Smokeless tobacco: Never Used    Vaping Use      Vaping Use: Angela Wray 15.0 %    RDW-SD 43.9 35.1 - 46.3 fL    Neutrophil Absolute Prelim 2.34 1.50 - 7.70 x10 (3) uL    Neutrophil Absolute 2.34 1.50 - 7.70 x10(3) uL    Lymphocyte Absolute 1.36 1.00 - 4.00 x10(3) uL    Monocyte Absolute 0.28 0.10 - 1.00 x10(3) uL    Eosinophil esophagitis present  Gassiness  Screening for colon cancer  Laboratory examination ordered as part of a routine general medical examination  Screening for genitourinary condition  Osteopenia of multiple sites  Migraine without aura and without status migra

## 2021-05-21 ENCOUNTER — HOSPITAL ENCOUNTER (OUTPATIENT)
Dept: CT IMAGING | Facility: HOSPITAL | Age: 61
Discharge: HOME | End: 2021-05-21
Attending: FAMILY MEDICINE
Payer: COMMERCIAL

## 2021-05-21 DIAGNOSIS — R91.1 LUNG NODULE < 6CM ON CT: ICD-10-CM

## 2021-05-21 PROCEDURE — 71250 CT THORAX DX C-: CPT | Performed by: FAMILY MEDICINE

## 2021-06-07 ENCOUNTER — HOSPITAL ENCOUNTER (OUTPATIENT)
Dept: CARDIOLOGY CLINIC | Facility: HOSPITAL | Age: 61
Discharge: HOME | End: 2021-06-07
Attending: FAMILY MEDICINE

## 2021-06-07 DIAGNOSIS — Z13.6 SCREENING FOR CARDIOVASCULAR CONDITION: ICD-10-CM

## 2021-06-29 ENCOUNTER — HOSPITAL ENCOUNTER (OUTPATIENT)
Dept: ULTRASOUND IMAGING | Age: 61
Discharge: HOME | End: 2021-06-29
Attending: FAMILY MEDICINE
Payer: COMMERCIAL

## 2021-06-29 DIAGNOSIS — I70.90 ATHEROSCLEROSIS: ICD-10-CM

## 2021-06-29 DIAGNOSIS — E04.1 THYROID NODULE: ICD-10-CM

## 2021-06-29 PROCEDURE — 76536 US EXAM OF HEAD AND NECK: CPT | Performed by: FAMILY MEDICINE

## 2021-06-29 PROCEDURE — 93880 EXTRACRANIAL BILAT STUDY: CPT | Performed by: FAMILY MEDICINE

## 2021-07-07 ENCOUNTER — ORDER TRANSCRIPTION (OUTPATIENT)
Dept: ADMINISTRATIVE | Facility: HOSPITAL | Age: 61
End: 2021-07-07

## 2021-07-07 ENCOUNTER — TELEPHONE (OUTPATIENT)
Dept: FAMILY MEDICINE CLINIC | Facility: CLINIC | Age: 61
End: 2021-07-07

## 2021-07-07 DIAGNOSIS — Z11.59 ENCOUNTER FOR SCREENING FOR OTHER VIRAL DISEASES: ICD-10-CM

## 2021-07-07 DIAGNOSIS — Z01.818 PREOP EXAMINATION: Primary | ICD-10-CM

## 2021-07-07 NOTE — TELEPHONE ENCOUNTER
Record shows CT chest order from dr Shant Bhat 1/21/21 and done 5/21/21 w the following documentation from [facility] central referral dept:    \"  Prior Authorization Confirmed/Denied/NA 05/20/2021 10:50 AM Maximiliaon Jolley - -   Note    Status: Prior Auth Not Requi

## 2021-07-07 NOTE — TELEPHONE ENCOUNTER
To [facility] central referrals-transferred to [facility]-covering for keiry-reaches voice mail. Left [facility] req call back to me today to discuss call info received from [facility] re a completed imaging test. Provided our ofc  line # and my name.

## 2021-07-07 NOTE — TELEPHONE ENCOUNTER
Caller Requesting:    Post claim clinical review is needed for CT scan of chest done 5/21/2021 because no pre-authorization was done.     Verbal authorization can be obtained through [facility] at [phone] or via web portal:    [email or web address]

## 2021-07-08 NOTE — TELEPHONE ENCOUNTER
Kyung from [employer] calling back     Re [place] CT chest 5/21  Can be resub for adjustment if meeting medical necessity    Connected to [place]--post claim reviewer   Indication/reason/cpt codes/medical info provided as requested  Obtained authorization #[id number]

## 2021-07-16 ENCOUNTER — MED REC SCAN ONLY (OUTPATIENT)
Dept: FAMILY MEDICINE CLINIC | Facility: CLINIC | Age: 61
End: 2021-07-16

## 2021-07-20 ENCOUNTER — LAB ENCOUNTER (OUTPATIENT)
Dept: LAB | Age: 61
End: 2021-07-20
Attending: OTOLARYNGOLOGY
Payer: COMMERCIAL

## 2021-07-20 DIAGNOSIS — Z11.59 ENCOUNTER FOR SCREENING FOR OTHER VIRAL DISEASES: ICD-10-CM

## 2021-07-20 DIAGNOSIS — Z01.818 PREOP EXAMINATION: ICD-10-CM

## 2021-07-21 LAB — SARS-COV-2 RNA RESP QL NAA+PROBE: NOT DETECTED

## 2021-07-23 ENCOUNTER — HOSPITAL ENCOUNTER (OUTPATIENT)
Dept: ULTRASOUND IMAGING | Facility: HOSPITAL | Age: 61
Discharge: HOME | End: 2021-07-23
Attending: OTOLARYNGOLOGY
Payer: COMMERCIAL

## 2021-07-23 DIAGNOSIS — E04.1 NONTOXIC UNINODULAR GOITER: ICD-10-CM

## 2021-07-23 PROCEDURE — 88173 CYTOPATH EVAL FNA REPORT: CPT | Performed by: OTOLARYNGOLOGY

## 2021-07-23 PROCEDURE — 10005 FNA BX W/US GDN 1ST LES: CPT | Performed by: OTOLARYNGOLOGY

## 2021-07-24 ENCOUNTER — LAB ENCOUNTER (OUTPATIENT)
Dept: LAB | Age: 61
End: 2021-07-24
Attending: INTERNAL MEDICINE
Payer: COMMERCIAL

## 2021-07-24 DIAGNOSIS — Z01.818 PRE-OP TESTING: ICD-10-CM

## 2021-07-25 LAB — SARS-COV-2 RNA RESP QL NAA+PROBE: NOT DETECTED

## 2021-07-27 PROBLEM — Z86.0101 HISTORY OF ADENOMATOUS POLYP OF COLON: Status: ACTIVE | Noted: 2021-07-27

## 2021-07-27 PROBLEM — Z86.010 HISTORY OF ADENOMATOUS POLYP OF COLON: Status: ACTIVE | Noted: 2021-07-27

## 2021-08-12 ENCOUNTER — TELEPHONE (OUTPATIENT)
Dept: FAMILY MEDICINE CLINIC | Facility: CLINIC | Age: 61
End: 2021-08-12

## 2021-08-12 NOTE — TELEPHONE ENCOUNTER
Lory from [facility] called. The patients ultrasound of the carotid doppler needed to be authorized prior to th patient having the test. Lory did a three way call to [facility] ( [phone]) I spoke with Ronit Darden.  I confirmed the [place] code of 23[address] for the [facility]

## 2021-08-12 NOTE — TELEPHONE ENCOUNTER
They will only do a peer to peer now. Unfortunately you will have to call them tomorrow. I tried. I was on the phone for 45 minutes talking with [place] and [employer].

## 2021-08-13 NOTE — TELEPHONE ENCOUNTER
Patient is aware this was authorized and moving forward we need to check with insurance before doing any type of imaging/testing. Patient verbalized understanding.

## 2021-08-27 ENCOUNTER — TELEPHONE (OUTPATIENT)
Dept: FAMILY MEDICINE CLINIC | Facility: CLINIC | Age: 61
End: 2021-08-27

## 2021-08-27 NOTE — TELEPHONE ENCOUNTER
Diane Charles from Dr. Cris Doyle office     Pt is s/p FNA July   ENT recommends repeat thyroid function test     F/u with Dr. Ravinder Ward 9/11     Bharath Wood

## 2021-09-07 ENCOUNTER — LAB ENCOUNTER (OUTPATIENT)
Dept: LAB | Age: 61
End: 2021-09-07
Attending: FAMILY MEDICINE
Payer: COMMERCIAL

## 2021-09-07 DIAGNOSIS — E04.2 MULTIPLE THYROID NODULES: ICD-10-CM

## 2021-09-07 DIAGNOSIS — E55.9 VITAMIN D DEFICIENCY: ICD-10-CM

## 2021-09-07 DIAGNOSIS — E78.5 DYSLIPIDEMIA: ICD-10-CM

## 2021-09-07 DIAGNOSIS — Z13.89 SCREENING FOR GENITOURINARY CONDITION: ICD-10-CM

## 2021-09-07 DIAGNOSIS — Z00.00 LABORATORY EXAMINATION ORDERED AS PART OF A ROUTINE GENERAL MEDICAL EXAMINATION: ICD-10-CM

## 2021-09-07 LAB
ALBUMIN SERPL-MCNC: 3.8 G/DL (ref 3.4–5)
ALBUMIN/GLOB SERPL: 1 {RATIO} (ref 1–2)
ALP LIVER SERPL-CCNC: 55 U/L
ALT SERPL-CCNC: 52 U/L
ANION GAP SERPL CALC-SCNC: 5 MMOL/L (ref 0–18)
AST SERPL-CCNC: 41 U/L (ref 15–37)
BASOPHILS # BLD AUTO: 0.04 X10(3) UL (ref 0–0.2)
BASOPHILS NFR BLD AUTO: 1 %
BILIRUB SERPL-MCNC: 0.4 MG/DL (ref 0.1–2)
BILIRUB UR QL STRIP.AUTO: NEGATIVE
BUN BLD-MCNC: 14 MG/DL (ref 7–18)
CALCIUM BLD-MCNC: 9.5 MG/DL (ref 8.5–10.1)
CHLORIDE SERPL-SCNC: 107 MMOL/L (ref 98–112)
CHOLEST SMN-MCNC: 221 MG/DL (ref ?–200)
CLARITY UR REFRACT.AUTO: CLEAR
CO2 SERPL-SCNC: 27 MMOL/L (ref 21–32)
COLOR UR AUTO: YELLOW
CREAT BLD-MCNC: 0.8 MG/DL
EOSINOPHIL # BLD AUTO: 0.19 X10(3) UL (ref 0–0.7)
EOSINOPHIL NFR BLD AUTO: 4.9 %
ERYTHROCYTE [DISTWIDTH] IN BLOOD BY AUTOMATED COUNT: 13.2 %
GLOBULIN PLAS-MCNC: 3.7 G/DL (ref 2.8–4.4)
GLUCOSE BLD-MCNC: 89 MG/DL (ref 70–99)
GLUCOSE UR STRIP.AUTO-MCNC: NEGATIVE MG/DL
HCT VFR BLD AUTO: 39 %
HDLC SERPL-MCNC: 75 MG/DL (ref 40–59)
HGB BLD-MCNC: 12.6 G/DL
IMM GRANULOCYTES # BLD AUTO: 0 X10(3) UL (ref 0–1)
IMM GRANULOCYTES NFR BLD: 0 %
KETONES UR STRIP.AUTO-MCNC: NEGATIVE MG/DL
LDLC SERPL CALC-MCNC: 119 MG/DL (ref ?–100)
LEUKOCYTE ESTERASE UR QL STRIP.AUTO: NEGATIVE
LYMPHOCYTES # BLD AUTO: 1.37 X10(3) UL (ref 1–4)
LYMPHOCYTES NFR BLD AUTO: 35.5 %
M PROTEIN MFR SERPL ELPH: 7.5 G/DL (ref 6.4–8.2)
MCH RBC QN AUTO: 30.5 PG (ref 26–34)
MCHC RBC AUTO-ENTMCNC: 32.3 G/DL (ref 31–37)
MCV RBC AUTO: 94.4 FL
MONOCYTES # BLD AUTO: 0.27 X10(3) UL (ref 0.1–1)
MONOCYTES NFR BLD AUTO: 7 %
NEUTROPHILS # BLD AUTO: 1.99 X10 (3) UL (ref 1.5–7.7)
NEUTROPHILS # BLD AUTO: 1.99 X10(3) UL (ref 1.5–7.7)
NEUTROPHILS NFR BLD AUTO: 51.6 %
NITRITE UR QL STRIP.AUTO: NEGATIVE
NONHDLC SERPL-MCNC: 146 MG/DL (ref ?–130)
OSMOLALITY SERPL CALC.SUM OF ELEC: 288 MOSM/KG (ref 275–295)
PATIENT FASTING Y/N/NP: YES
PATIENT FASTING Y/N/NP: YES
PH UR STRIP.AUTO: 7 [PH] (ref 5–8)
PLATELET # BLD AUTO: 327 10(3)UL (ref 150–450)
POTASSIUM SERPL-SCNC: 3.9 MMOL/L (ref 3.5–5.1)
PROT UR STRIP.AUTO-MCNC: NEGATIVE MG/DL
RBC # BLD AUTO: 4.13 X10(6)UL
RBC UR QL AUTO: NEGATIVE
SODIUM SERPL-SCNC: 139 MMOL/L (ref 136–145)
SP GR UR STRIP.AUTO: 1.01 (ref 1–1.03)
T4 FREE SERPL-MCNC: 0.8 NG/DL (ref 0.8–1.7)
TRIGL SERPL-MCNC: 158 MG/DL (ref 30–149)
TSI SER-ACNC: 2.21 MIU/ML (ref 0.36–3.74)
UROBILINOGEN UR STRIP.AUTO-MCNC: <2 MG/DL
VIT D+METAB SERPL-MCNC: 59.4 NG/ML (ref 30–100)
VLDLC SERPL CALC-MCNC: 28 MG/DL (ref 0–30)
WBC # BLD AUTO: 3.9 X10(3) UL (ref 4–11)

## 2021-09-07 PROCEDURE — 80061 LIPID PANEL: CPT | Performed by: FAMILY MEDICINE

## 2021-09-07 PROCEDURE — 82306 VITAMIN D 25 HYDROXY: CPT | Performed by: FAMILY MEDICINE

## 2021-09-07 PROCEDURE — 81003 URINALYSIS AUTO W/O SCOPE: CPT | Performed by: FAMILY MEDICINE

## 2021-09-07 PROCEDURE — 84439 ASSAY OF FREE THYROXINE: CPT | Performed by: FAMILY MEDICINE

## 2021-09-07 PROCEDURE — 80050 GENERAL HEALTH PANEL: CPT | Performed by: FAMILY MEDICINE

## 2021-09-11 ENCOUNTER — OFFICE VISIT (OUTPATIENT)
Dept: FAMILY MEDICINE CLINIC | Facility: CLINIC | Age: 61
End: 2021-09-11
Payer: COMMERCIAL

## 2021-09-11 VITALS
RESPIRATION RATE: 18 BRPM | DIASTOLIC BLOOD PRESSURE: 76 MMHG | SYSTOLIC BLOOD PRESSURE: 120 MMHG | WEIGHT: 155 LBS | HEART RATE: 84 BPM | BODY MASS INDEX: 22.7 KG/M2 | HEIGHT: 69.25 IN

## 2021-09-11 DIAGNOSIS — Z00.00 LABORATORY EXAMINATION ORDERED AS PART OF A ROUTINE GENERAL MEDICAL EXAMINATION: ICD-10-CM

## 2021-09-11 DIAGNOSIS — Z00.00 ROUTINE GENERAL MEDICAL EXAMINATION AT A HEALTH CARE FACILITY: Primary | ICD-10-CM

## 2021-09-11 DIAGNOSIS — E78.5 DYSLIPIDEMIA: ICD-10-CM

## 2021-09-11 DIAGNOSIS — Z13.89 SCREENING FOR GENITOURINARY CONDITION: ICD-10-CM

## 2021-09-11 DIAGNOSIS — Z79.899 MEDICATION MANAGEMENT: ICD-10-CM

## 2021-09-11 DIAGNOSIS — R79.89 ELEVATED LFTS: ICD-10-CM

## 2021-09-11 DIAGNOSIS — I10 ESSENTIAL HYPERTENSION, BENIGN: ICD-10-CM

## 2021-09-11 PROCEDURE — 99396 PREV VISIT EST AGE 40-64: CPT | Performed by: FAMILY MEDICINE

## 2021-09-11 PROCEDURE — 3008F BODY MASS INDEX DOCD: CPT | Performed by: FAMILY MEDICINE

## 2021-09-11 PROCEDURE — 3078F DIAST BP <80 MM HG: CPT | Performed by: FAMILY MEDICINE

## 2021-09-11 PROCEDURE — 3074F SYST BP LT 130 MM HG: CPT | Performed by: FAMILY MEDICINE

## 2021-09-11 NOTE — H&P
CC: Annual Physical Exam    HPI:   Crystal Zhang is a 63year old female who presents for a complete physical exam. Symptoms: is menopausal. Patient complains of nothing.     When discussing the elevated LFT today, she did recall that she did drink for Bilirubin Urine Negative Negative    Ketones Urine Negative Negative mg/dL    Blood Urine Negative Negative    pH Urine 7.0 5.0 - 8.0    Protein Urine Negative Negative mg/dL    Urobilinogen Urine <2.0 <2.0 mg/dL    Nitrite Urine Negative Negative    Leuko Cap Take 2,000 Units by mouth daily. • Calcium Carbonate 600 MG Oral Tab Take 600 mg by mouth as needed with dialysis for Heartburn.      • SUMAtriptan Succinate 100 MG Oral Tab TAKE 1 TABLET EVERY 2 HOURS AS NEEDED 27 tablet 0   • Multiple Vitamins-Min Lipids Mother    • Lipids Sister    • Thyroid Disorder Sister         Hyperthyroid      Social History:   Social History    Tobacco Use      Smoking status: Never Smoker      Smokeless tobacco: Never Used    Vaping Use      Vaping Use: Never used    Alcoho and sensory are grossly intact  VASCULAR: 2 + dorsalis pedal pulses bilaterally    ASSESSMENT AND PLAN:   Kelly Desai is a 63year old female who presents for a complete physical exam.   Pap and pelvic not done. Self breast exam explained.    Healt

## 2021-10-14 RX ORDER — ATORVASTATIN CALCIUM 20 MG/1
TABLET, FILM COATED ORAL
Qty: 90 TABLET | Refills: 1 | Status: SHIPPED | OUTPATIENT
Start: 2021-10-14 | End: 2022-06-20

## 2021-10-14 RX ORDER — LISINOPRIL 5 MG/1
TABLET ORAL
Qty: 90 TABLET | Refills: 1 | Status: SHIPPED | OUTPATIENT
Start: 2021-10-14 | End: 2022-04-18

## 2021-12-09 ENCOUNTER — PATIENT MESSAGE (OUTPATIENT)
Dept: FAMILY MEDICINE CLINIC | Facility: CLINIC | Age: 61
End: 2021-12-09

## 2021-12-09 NOTE — TELEPHONE ENCOUNTER
From: Raina Pineda  To: Brandie Kim DO  Sent: 12/9/2021 3:33 PM CST  Subject: Bloodwork    Hi,  I have an 8am appointment on Tuesday, Dec. 14, so I was going to do the bloodwork you requested at my last appointment the Friday before.  I just looked

## 2021-12-10 ENCOUNTER — LAB ENCOUNTER (OUTPATIENT)
Dept: LAB | Age: 61
End: 2021-12-10
Attending: FAMILY MEDICINE
Payer: COMMERCIAL

## 2021-12-10 DIAGNOSIS — R79.89 ELEVATED LFTS: ICD-10-CM

## 2021-12-10 DIAGNOSIS — E83.52 HYPERCALCEMIA: ICD-10-CM

## 2021-12-10 DIAGNOSIS — Z79.899 MEDICATION MANAGEMENT: ICD-10-CM

## 2021-12-10 DIAGNOSIS — E78.5 DYSLIPIDEMIA: ICD-10-CM

## 2021-12-10 PROCEDURE — 80053 COMPREHEN METABOLIC PANEL: CPT

## 2021-12-10 PROCEDURE — 80061 LIPID PANEL: CPT

## 2021-12-10 PROCEDURE — 82565 ASSAY OF CREATININE: CPT

## 2021-12-10 PROCEDURE — 84100 ASSAY OF PHOSPHORUS: CPT

## 2021-12-10 PROCEDURE — 80074 ACUTE HEPATITIS PANEL: CPT

## 2021-12-10 PROCEDURE — 82310 ASSAY OF CALCIUM: CPT

## 2021-12-10 PROCEDURE — 83970 ASSAY OF PARATHORMONE: CPT

## 2021-12-13 NOTE — PROGRESS NOTES
Dear Alma Beal,    Your blood work is stable. Your lipid panel is pending.      Sincerely,  Dr. Gianna Su

## 2021-12-14 ENCOUNTER — OFFICE VISIT (OUTPATIENT)
Dept: FAMILY MEDICINE CLINIC | Facility: CLINIC | Age: 61
End: 2021-12-14
Payer: COMMERCIAL

## 2021-12-14 VITALS
SYSTOLIC BLOOD PRESSURE: 120 MMHG | RESPIRATION RATE: 16 BRPM | HEART RATE: 88 BPM | DIASTOLIC BLOOD PRESSURE: 70 MMHG | WEIGHT: 163 LBS | BODY MASS INDEX: 23.34 KG/M2 | HEIGHT: 70 IN

## 2021-12-14 DIAGNOSIS — E04.2 MULTIPLE THYROID NODULES: ICD-10-CM

## 2021-12-14 DIAGNOSIS — G43.009 MIGRAINE WITHOUT AURA AND WITHOUT STATUS MIGRAINOSUS, NOT INTRACTABLE: ICD-10-CM

## 2021-12-14 DIAGNOSIS — E78.5 DYSLIPIDEMIA: ICD-10-CM

## 2021-12-14 DIAGNOSIS — L71.9 ROSACEA: ICD-10-CM

## 2021-12-14 DIAGNOSIS — R79.89 ELEVATED LFTS: ICD-10-CM

## 2021-12-14 DIAGNOSIS — K21.9 GASTROESOPHAGEAL REFLUX DISEASE, UNSPECIFIED WHETHER ESOPHAGITIS PRESENT: ICD-10-CM

## 2021-12-14 DIAGNOSIS — Z71.85 VACCINE COUNSELING: ICD-10-CM

## 2021-12-14 DIAGNOSIS — I10 ESSENTIAL HYPERTENSION, BENIGN: Primary | ICD-10-CM

## 2021-12-14 DIAGNOSIS — F43.21 GRIEF: ICD-10-CM

## 2021-12-14 DIAGNOSIS — G89.29 CHRONIC LEFT-SIDED LOW BACK PAIN WITH LEFT-SIDED SCIATICA: ICD-10-CM

## 2021-12-14 DIAGNOSIS — M54.42 CHRONIC LEFT-SIDED LOW BACK PAIN WITH LEFT-SIDED SCIATICA: ICD-10-CM

## 2021-12-14 DIAGNOSIS — Z23 NEED FOR SHINGLES VACCINE: ICD-10-CM

## 2021-12-14 DIAGNOSIS — R93.1 ABNORMAL CT SCAN OF HEART: ICD-10-CM

## 2021-12-14 DIAGNOSIS — Z79.899 MEDICATION MANAGEMENT: ICD-10-CM

## 2021-12-14 DIAGNOSIS — M51.36 DDD (DEGENERATIVE DISC DISEASE), LUMBAR: ICD-10-CM

## 2021-12-14 DIAGNOSIS — M85.89 OSTEOPENIA OF MULTIPLE SITES: ICD-10-CM

## 2021-12-14 DIAGNOSIS — E55.9 VITAMIN D DEFICIENCY: ICD-10-CM

## 2021-12-14 PROCEDURE — 90750 HZV VACC RECOMBINANT IM: CPT | Performed by: FAMILY MEDICINE

## 2021-12-14 PROCEDURE — 3074F SYST BP LT 130 MM HG: CPT | Performed by: FAMILY MEDICINE

## 2021-12-14 PROCEDURE — 99214 OFFICE O/P EST MOD 30 MIN: CPT | Performed by: FAMILY MEDICINE

## 2021-12-14 PROCEDURE — 3008F BODY MASS INDEX DOCD: CPT | Performed by: FAMILY MEDICINE

## 2021-12-14 PROCEDURE — 90471 IMMUNIZATION ADMIN: CPT | Performed by: FAMILY MEDICINE

## 2021-12-14 PROCEDURE — 3078F DIAST BP <80 MM HG: CPT | Performed by: FAMILY MEDICINE

## 2021-12-14 RX ORDER — DESONIDE 0.5 MG/G
OINTMENT TOPICAL
COMMUNITY
Start: 2021-09-28

## 2021-12-14 NOTE — PROGRESS NOTES
Reola Frankel is a 63year old female. HPI:   Pt. Is here for a med check. Father passed recently and had kidney problems and calium went high. He was confused. He had multiple myeloma.     HTN -- pt is taking lisinopril -- no side effects; does Flatulence/gas pain/belching     It seems to be worse lately.    • High cholesterol    • Hypercholesterolemia    • Other psoriasis    • Rosacea    • Skin blushing/flushing    • Unspecified essential hypertension    • Wears glasses       Social History:  Soc vision or double vision  HEENT:  nasal congestion, sinus pain or ST  LUNGS: denies shortness of breath with exertion  CARDIOVASCULAR: denies chest pain on exertion  GI: denies abdominal pain,denies heartburn  : denies dysuria  MUSCULOSKELETAL:  back pain PHYSICAL THERAPY & REHAB   HTN -- pt is taking lisinopril 5 mg daily   Dyslipidemia -- pt is taking lipitor -- no side effects  Migraines -- doing well  Rosacea -- stable per derm  Eczema -- stable  Osteopenia -- stable on fosamax; discussed weight bearing

## 2021-12-16 ENCOUNTER — TELEPHONE (OUTPATIENT)
Dept: FAMILY MEDICINE CLINIC | Facility: CLINIC | Age: 61
End: 2021-12-16

## 2021-12-16 DIAGNOSIS — E78.5 DYSLIPIDEMIA: Primary | ICD-10-CM

## 2021-12-16 NOTE — TELEPHONE ENCOUNTER
Pt received her shingles vaccine earlier this week. She has developed a fever of 100.6 & her arm is swollen and red. Pt would like to know if this is a normal reaction or should she be concerned with Covid.     Please advise and thank you

## 2022-01-04 ENCOUNTER — OFFICE VISIT (OUTPATIENT)
Dept: PHYSICAL THERAPY | Age: 62
End: 2022-01-04
Attending: FAMILY MEDICINE
Payer: COMMERCIAL

## 2022-01-04 DIAGNOSIS — G89.29 CHRONIC LEFT-SIDED LOW BACK PAIN WITH LEFT-SIDED SCIATICA: ICD-10-CM

## 2022-01-04 DIAGNOSIS — M54.42 CHRONIC LEFT-SIDED LOW BACK PAIN WITH LEFT-SIDED SCIATICA: ICD-10-CM

## 2022-01-04 PROCEDURE — 97110 THERAPEUTIC EXERCISES: CPT | Performed by: PHYSICAL THERAPIST

## 2022-01-04 PROCEDURE — 97161 PT EVAL LOW COMPLEX 20 MIN: CPT | Performed by: PHYSICAL THERAPIST

## 2022-01-04 NOTE — PROGRESS NOTES
BACK EVALUATION:    Referring Physician: Sami Bowman    DX Code: Chronic left-sided low back pain with left-sided sciatica (M54.42,G89.29)       PT DX: Chronic left-sided low back pain with left-sided sciatica (M54.42,G89.29)      PCP:     Age: 63 Berna resume normal activities. Postural Education     Signs and symptoms are consistent with patient’s diagnosis.   Functional impairments include:Difficulty or limited tolerance for ADLs including: stairs, hills    Rehab Potential:good  Education or treatment

## 2022-01-05 ENCOUNTER — TELEPHONE (OUTPATIENT)
Dept: PHYSICAL THERAPY | Facility: HOSPITAL | Age: 62
End: 2022-01-05

## 2022-01-06 ENCOUNTER — OFFICE VISIT (OUTPATIENT)
Dept: PHYSICAL THERAPY | Age: 62
End: 2022-01-06
Attending: FAMILY MEDICINE
Payer: COMMERCIAL

## 2022-01-06 DIAGNOSIS — G89.29 CHRONIC LEFT-SIDED LOW BACK PAIN WITH LEFT-SIDED SCIATICA: ICD-10-CM

## 2022-01-06 DIAGNOSIS — M54.42 CHRONIC LEFT-SIDED LOW BACK PAIN WITH LEFT-SIDED SCIATICA: ICD-10-CM

## 2022-01-06 PROCEDURE — 97140 MANUAL THERAPY 1/> REGIONS: CPT | Performed by: PHYSICAL THERAPIST

## 2022-01-06 PROCEDURE — 97110 THERAPEUTIC EXERCISES: CPT | Performed by: PHYSICAL THERAPIST

## 2022-01-06 NOTE — PROGRESS NOTES
Dx: Chronic left-sided low back pain with left-sided sciatica (M54.42,G89.29)         Authorized # of Visits:  8         Next MD visit: none scheduled  Fall Risk: standard         Precautions: n/a           Medication Changes since last visit?: No  Subject pain.  · able to return to walking for exercise  · Pt. will be independent with home exercise program and self management.     Plan: cont HEP, change positions hourly, support back when sitting, try towel roll in bed, assess ability to control symptoms and

## 2022-01-11 ENCOUNTER — OFFICE VISIT (OUTPATIENT)
Dept: PHYSICAL THERAPY | Age: 62
End: 2022-01-11
Attending: FAMILY MEDICINE
Payer: COMMERCIAL

## 2022-01-11 DIAGNOSIS — M54.42 CHRONIC LEFT-SIDED LOW BACK PAIN WITH LEFT-SIDED SCIATICA: ICD-10-CM

## 2022-01-11 DIAGNOSIS — G89.29 CHRONIC LEFT-SIDED LOW BACK PAIN WITH LEFT-SIDED SCIATICA: ICD-10-CM

## 2022-01-11 PROCEDURE — 97110 THERAPEUTIC EXERCISES: CPT | Performed by: PHYSICAL THERAPIST

## 2022-01-11 PROCEDURE — 97140 MANUAL THERAPY 1/> REGIONS: CPT | Performed by: PHYSICAL THERAPIST

## 2022-01-11 NOTE — PROGRESS NOTES
Dx: Chronic left-sided low back pain with left-sided sciatica (M54.42,G89.29)         Authorized # of Visits:  8         Next MD visit: none scheduled  Fall Risk: standard         Precautions: n/a           Medication Changes since last visit?: No     Subj worst.  · Increase strength of L hip ext to 5/5 to allow pt to negotiate hills and stairs without pain. · Pt. will be able to tolerate walking for 20 minutes without increased symptoms. · Pt. will be able to perform ADLs with no pain.   · able to return t

## 2022-01-13 ENCOUNTER — OFFICE VISIT (OUTPATIENT)
Dept: PHYSICAL THERAPY | Age: 62
End: 2022-01-13
Attending: FAMILY MEDICINE
Payer: COMMERCIAL

## 2022-01-13 DIAGNOSIS — M54.42 CHRONIC LEFT-SIDED LOW BACK PAIN WITH LEFT-SIDED SCIATICA: ICD-10-CM

## 2022-01-13 DIAGNOSIS — G89.29 CHRONIC LEFT-SIDED LOW BACK PAIN WITH LEFT-SIDED SCIATICA: ICD-10-CM

## 2022-01-13 PROCEDURE — 97110 THERAPEUTIC EXERCISES: CPT | Performed by: PHYSICAL THERAPIST

## 2022-01-13 PROCEDURE — 97140 MANUAL THERAPY 1/> REGIONS: CPT | Performed by: PHYSICAL THERAPIST

## 2022-01-13 NOTE — PROGRESS NOTES
Dx: Chronic left-sided low back pain with left-sided sciatica (M54.42,G89.29)         Authorized # of Visits:  8         Next MD visit: none scheduled  Fall Risk: standard         Precautions: n/a           Medication Changes since last visit?: No     Subj in 8 visits. · Pt. will report decreased pain from 4/10 to 1/10 at worst.  · Increase strength of L hip ext to 5/5 to allow pt to negotiate hills and stairs without pain. · Pt. will be able to tolerate walking for 20 minutes without increased symptoms.

## 2022-01-18 ENCOUNTER — OFFICE VISIT (OUTPATIENT)
Dept: PHYSICAL THERAPY | Age: 62
End: 2022-01-18
Attending: FAMILY MEDICINE
Payer: COMMERCIAL

## 2022-01-18 DIAGNOSIS — G89.29 CHRONIC LEFT-SIDED LOW BACK PAIN WITH LEFT-SIDED SCIATICA: ICD-10-CM

## 2022-01-18 DIAGNOSIS — M54.42 CHRONIC LEFT-SIDED LOW BACK PAIN WITH LEFT-SIDED SCIATICA: ICD-10-CM

## 2022-01-18 PROCEDURE — 97140 MANUAL THERAPY 1/> REGIONS: CPT | Performed by: PHYSICAL THERAPIST

## 2022-01-18 PROCEDURE — 97110 THERAPEUTIC EXERCISES: CPT | Performed by: PHYSICAL THERAPIST

## 2022-01-18 NOTE — PROGRESS NOTES
Dx: Chronic left-sided low back pain with left-sided sciatica (M54.42,G89.29)         Authorized # of Visits:  8         Next MD visit: none scheduled  Fall Risk: standard         Precautions: n/a           Medication Changes since last visit?: No     Subj ext x 8 min, gr 4, nil Man PKB mobs x 3 min ea, lumbar ext mobs and rot mobs in ext x 8 min, gr 4, nil Man PKB mobs x 3 min ea, lumbar ext mobs and rot mobs in ext x 8 min, gr 4, nil Man PKB mobs x 3 min ea, lumbar ext mobs and rot mobs in ext x 8 min, gr

## 2022-01-20 ENCOUNTER — APPOINTMENT (OUTPATIENT)
Dept: PHYSICAL THERAPY | Age: 62
End: 2022-01-20
Attending: FAMILY MEDICINE
Payer: COMMERCIAL

## 2022-01-25 ENCOUNTER — OFFICE VISIT (OUTPATIENT)
Dept: PHYSICAL THERAPY | Age: 62
End: 2022-01-25
Attending: FAMILY MEDICINE
Payer: COMMERCIAL

## 2022-01-25 DIAGNOSIS — M54.42 CHRONIC LEFT-SIDED LOW BACK PAIN WITH LEFT-SIDED SCIATICA: ICD-10-CM

## 2022-01-25 DIAGNOSIS — G89.29 CHRONIC LEFT-SIDED LOW BACK PAIN WITH LEFT-SIDED SCIATICA: ICD-10-CM

## 2022-01-25 PROCEDURE — 97110 THERAPEUTIC EXERCISES: CPT | Performed by: PHYSICAL THERAPIST

## 2022-01-25 PROCEDURE — 97140 MANUAL THERAPY 1/> REGIONS: CPT | Performed by: PHYSICAL THERAPIST

## 2022-01-25 NOTE — PROGRESS NOTES
Dx: Chronic left-sided low back pain with left-sided sciatica (M54.42,G89.29)         Authorized # of Visits:  8         Next MD visit: none scheduled  Fall Risk: standard         Precautions: n/a           Medication Changes since last visit?: No     Subj x 20 ea Prone hip ext x 20 ea Prone hip ext x 20 ea Prone hip ext x 20 ea     Partial REIL x 10, nil  REIL x 10, nil REIL x 10, nil REIL x 10, nil REIL x 10, P central LBP, NW, partial REIL w man OP x 5 - nil     Man PKB mobs x 3 min ea, lumbar ext mobs an

## 2022-01-27 ENCOUNTER — APPOINTMENT (OUTPATIENT)
Dept: PHYSICAL THERAPY | Age: 62
End: 2022-01-27
Attending: FAMILY MEDICINE
Payer: COMMERCIAL

## 2022-02-02 ENCOUNTER — OFFICE VISIT (OUTPATIENT)
Dept: PHYSICAL THERAPY | Age: 62
End: 2022-02-02
Attending: FAMILY MEDICINE
Payer: COMMERCIAL

## 2022-02-02 PROCEDURE — 97140 MANUAL THERAPY 1/> REGIONS: CPT | Performed by: PHYSICAL THERAPIST

## 2022-02-02 PROCEDURE — 97110 THERAPEUTIC EXERCISES: CPT | Performed by: PHYSICAL THERAPIST

## 2022-02-09 ENCOUNTER — OFFICE VISIT (OUTPATIENT)
Dept: PHYSICAL THERAPY | Age: 62
End: 2022-02-09
Attending: FAMILY MEDICINE
Payer: COMMERCIAL

## 2022-02-09 PROCEDURE — 97110 THERAPEUTIC EXERCISES: CPT | Performed by: PHYSICAL THERAPIST

## 2022-02-09 PROCEDURE — 97140 MANUAL THERAPY 1/> REGIONS: CPT | Performed by: PHYSICAL THERAPIST

## 2022-02-23 ENCOUNTER — APPOINTMENT (OUTPATIENT)
Dept: PHYSICAL THERAPY | Age: 62
End: 2022-02-23
Attending: FAMILY MEDICINE
Payer: COMMERCIAL

## 2022-03-03 ENCOUNTER — APPOINTMENT (OUTPATIENT)
Dept: PHYSICAL THERAPY | Age: 62
End: 2022-03-03
Attending: FAMILY MEDICINE
Payer: COMMERCIAL

## 2022-03-11 ENCOUNTER — LAB ENCOUNTER (OUTPATIENT)
Dept: LAB | Facility: REFERENCE LAB | Age: 62
End: 2022-03-11
Attending: FAMILY MEDICINE
Payer: COMMERCIAL

## 2022-03-11 DIAGNOSIS — E78.5 DYSLIPIDEMIA: ICD-10-CM

## 2022-03-11 LAB
ALBUMIN SERPL-MCNC: 4.2 G/DL (ref 3.4–5)
ALBUMIN/GLOB SERPL: 1.2 {RATIO} (ref 1–2)
ALP LIVER SERPL-CCNC: 56 U/L
ALT SERPL-CCNC: 39 U/L
ANION GAP SERPL CALC-SCNC: 6 MMOL/L (ref 0–18)
AST SERPL-CCNC: 35 U/L (ref 15–37)
BILIRUB SERPL-MCNC: 0.5 MG/DL (ref 0.1–2)
BUN BLD-MCNC: 13 MG/DL (ref 7–18)
BUN/CREAT SERPL: 14 (ref 10–20)
CALCIUM BLD-MCNC: 9.7 MG/DL (ref 8.5–10.1)
CHLORIDE SERPL-SCNC: 107 MMOL/L (ref 98–112)
CHOLEST SERPL-MCNC: 209 MG/DL (ref ?–200)
CO2 SERPL-SCNC: 29 MMOL/L (ref 21–32)
CREAT BLD-MCNC: 0.93 MG/DL
FASTING PATIENT LIPID ANSWER: YES
FASTING STATUS PATIENT QL REPORTED: YES
GLOBULIN PLAS-MCNC: 3.6 G/DL (ref 2.8–4.4)
GLUCOSE BLD-MCNC: 89 MG/DL (ref 70–99)
HDLC SERPL-MCNC: 89 MG/DL (ref 40–59)
LDLC SERPL CALC-MCNC: 96 MG/DL (ref ?–100)
NONHDLC SERPL-MCNC: 120 MG/DL (ref ?–130)
OSMOLALITY SERPL CALC.SUM OF ELEC: 294 MOSM/KG (ref 275–295)
POTASSIUM SERPL-SCNC: 4.3 MMOL/L (ref 3.5–5.1)
PROT SERPL-MCNC: 7.8 G/DL (ref 6.4–8.2)
SODIUM SERPL-SCNC: 142 MMOL/L (ref 136–145)
TRIGL SERPL-MCNC: 143 MG/DL (ref 30–149)
VLDLC SERPL CALC-MCNC: 23 MG/DL (ref 0–30)

## 2022-03-11 PROCEDURE — 80061 LIPID PANEL: CPT | Performed by: FAMILY MEDICINE

## 2022-03-11 PROCEDURE — 80053 COMPREHEN METABOLIC PANEL: CPT | Performed by: FAMILY MEDICINE

## 2022-03-15 ENCOUNTER — OFFICE VISIT (OUTPATIENT)
Dept: FAMILY MEDICINE CLINIC | Facility: CLINIC | Age: 62
End: 2022-03-15
Payer: COMMERCIAL

## 2022-03-15 VITALS
TEMPERATURE: 97 F | DIASTOLIC BLOOD PRESSURE: 72 MMHG | SYSTOLIC BLOOD PRESSURE: 118 MMHG | RESPIRATION RATE: 16 BRPM | OXYGEN SATURATION: 98 % | BODY MASS INDEX: 24.34 KG/M2 | HEIGHT: 70 IN | WEIGHT: 170 LBS | HEART RATE: 76 BPM

## 2022-03-15 DIAGNOSIS — M85.89 OSTEOPENIA OF MULTIPLE SITES: ICD-10-CM

## 2022-03-15 DIAGNOSIS — R91.1 LUNG NODULE < 6CM ON CT: ICD-10-CM

## 2022-03-15 DIAGNOSIS — K21.9 GASTROESOPHAGEAL REFLUX DISEASE, UNSPECIFIED WHETHER ESOPHAGITIS PRESENT: ICD-10-CM

## 2022-03-15 DIAGNOSIS — E55.9 VITAMIN D DEFICIENCY: ICD-10-CM

## 2022-03-15 DIAGNOSIS — Z00.00 LABORATORY EXAMINATION ORDERED AS PART OF A ROUTINE GENERAL MEDICAL EXAMINATION: ICD-10-CM

## 2022-03-15 DIAGNOSIS — M54.50 CHRONIC BILATERAL LOW BACK PAIN WITHOUT SCIATICA: ICD-10-CM

## 2022-03-15 DIAGNOSIS — G43.009 MIGRAINE WITHOUT AURA AND WITHOUT STATUS MIGRAINOSUS, NOT INTRACTABLE: ICD-10-CM

## 2022-03-15 DIAGNOSIS — Z23 NEED FOR VACCINATION: ICD-10-CM

## 2022-03-15 DIAGNOSIS — Z79.899 MEDICATION MANAGEMENT: ICD-10-CM

## 2022-03-15 DIAGNOSIS — G89.29 CHRONIC BILATERAL LOW BACK PAIN WITHOUT SCIATICA: ICD-10-CM

## 2022-03-15 DIAGNOSIS — L30.9 ECZEMA, UNSPECIFIED TYPE: ICD-10-CM

## 2022-03-15 DIAGNOSIS — E04.2 MULTIPLE THYROID NODULES: ICD-10-CM

## 2022-03-15 DIAGNOSIS — I10 ESSENTIAL HYPERTENSION, BENIGN: ICD-10-CM

## 2022-03-15 DIAGNOSIS — R93.1 ABNORMAL CT SCAN OF HEART: ICD-10-CM

## 2022-03-15 DIAGNOSIS — Z71.85 VACCINE COUNSELING: ICD-10-CM

## 2022-03-15 DIAGNOSIS — E78.5 DYSLIPIDEMIA: ICD-10-CM

## 2022-03-15 DIAGNOSIS — Z13.820 SCREENING FOR OSTEOPOROSIS: Primary | ICD-10-CM

## 2022-03-15 DIAGNOSIS — L71.9 ROSACEA: ICD-10-CM

## 2022-03-15 DIAGNOSIS — K59.00 CONSTIPATION, UNSPECIFIED CONSTIPATION TYPE: ICD-10-CM

## 2022-03-15 DIAGNOSIS — Z13.89 SCREENING FOR GENITOURINARY CONDITION: ICD-10-CM

## 2022-03-15 PROCEDURE — 3008F BODY MASS INDEX DOCD: CPT | Performed by: FAMILY MEDICINE

## 2022-03-15 PROCEDURE — 90750 HZV VACC RECOMBINANT IM: CPT | Performed by: FAMILY MEDICINE

## 2022-03-15 PROCEDURE — 90471 IMMUNIZATION ADMIN: CPT | Performed by: FAMILY MEDICINE

## 2022-03-15 PROCEDURE — 3078F DIAST BP <80 MM HG: CPT | Performed by: FAMILY MEDICINE

## 2022-03-15 PROCEDURE — 99214 OFFICE O/P EST MOD 30 MIN: CPT | Performed by: FAMILY MEDICINE

## 2022-03-15 PROCEDURE — 3074F SYST BP LT 130 MM HG: CPT | Performed by: FAMILY MEDICINE

## 2022-04-16 ENCOUNTER — OFFICE VISIT (OUTPATIENT)
Dept: FAMILY MEDICINE CLINIC | Facility: CLINIC | Age: 62
End: 2022-04-16
Payer: COMMERCIAL

## 2022-04-16 VITALS
TEMPERATURE: 100 F | DIASTOLIC BLOOD PRESSURE: 86 MMHG | HEART RATE: 104 BPM | OXYGEN SATURATION: 98 % | RESPIRATION RATE: 16 BRPM | SYSTOLIC BLOOD PRESSURE: 136 MMHG

## 2022-04-16 DIAGNOSIS — N30.01 ACUTE CYSTITIS WITH HEMATURIA: Primary | ICD-10-CM

## 2022-04-16 LAB
BILIRUBIN: NEGATIVE
GLUCOSE (URINE DIPSTICK): NEGATIVE MG/DL
KETONES (URINE DIPSTICK): NEGATIVE MG/DL
MULTISTIX LOT#: ABNORMAL NUMERIC
NITRITE, URINE: NEGATIVE
PH, URINE: 7 (ref 4.5–8)
PROTEIN (URINE DIPSTICK): >=300 MG/DL
SPECIFIC GRAVITY: 1.02 (ref 1–1.03)
UROBILINOGEN,SEMI-QN: 0.2 MG/DL (ref 0–1.9)

## 2022-04-16 PROCEDURE — 3075F SYST BP GE 130 - 139MM HG: CPT | Performed by: NURSE PRACTITIONER

## 2022-04-16 PROCEDURE — 81003 URINALYSIS AUTO W/O SCOPE: CPT | Performed by: NURSE PRACTITIONER

## 2022-04-16 PROCEDURE — 87077 CULTURE AEROBIC IDENTIFY: CPT | Performed by: NURSE PRACTITIONER

## 2022-04-16 PROCEDURE — 87186 SC STD MICRODIL/AGAR DIL: CPT | Performed by: NURSE PRACTITIONER

## 2022-04-16 PROCEDURE — 99213 OFFICE O/P EST LOW 20 MIN: CPT | Performed by: NURSE PRACTITIONER

## 2022-04-16 PROCEDURE — 87086 URINE CULTURE/COLONY COUNT: CPT | Performed by: NURSE PRACTITIONER

## 2022-04-16 PROCEDURE — 3079F DIAST BP 80-89 MM HG: CPT | Performed by: NURSE PRACTITIONER

## 2022-04-16 RX ORDER — CIPROFLOXACIN 250 MG/1
250 TABLET, FILM COATED ORAL 2 TIMES DAILY
Qty: 10 TABLET | Refills: 0 | Status: SHIPPED | OUTPATIENT
Start: 2022-04-16 | End: 2022-04-21

## 2022-04-16 RX ORDER — DOXYCYCLINE HYCLATE 100 MG/1
CAPSULE ORAL
COMMUNITY
Start: 2022-04-11

## 2022-04-18 RX ORDER — LISINOPRIL 5 MG/1
TABLET ORAL
Qty: 90 TABLET | Refills: 1 | Status: SHIPPED | OUTPATIENT
Start: 2022-04-18

## 2022-05-24 ENCOUNTER — HOSPITAL ENCOUNTER (OUTPATIENT)
Dept: GENERAL RADIOLOGY | Age: 62
Discharge: HOME | End: 2022-05-24
Attending: STUDENT IN AN ORGANIZED HEALTH CARE EDUCATION/TRAINING PROGRAM
Payer: COMMERCIAL

## 2022-05-24 ENCOUNTER — OFFICE VISIT (OUTPATIENT)
Dept: FAMILY MEDICINE CLINIC | Facility: CLINIC | Age: 62
End: 2022-05-24
Payer: COMMERCIAL

## 2022-05-24 ENCOUNTER — TELEPHONE (OUTPATIENT)
Dept: ORTHOPEDICS CLINIC | Facility: CLINIC | Age: 62
End: 2022-05-24

## 2022-05-24 VITALS
WEIGHT: 167 LBS | HEART RATE: 92 BPM | RESPIRATION RATE: 16 BRPM | SYSTOLIC BLOOD PRESSURE: 120 MMHG | BODY MASS INDEX: 23.91 KG/M2 | DIASTOLIC BLOOD PRESSURE: 70 MMHG | HEIGHT: 70 IN

## 2022-05-24 DIAGNOSIS — S92.155A CLOSED NONDISPLACED AVULSION FRACTURE OF LEFT TALUS, INITIAL ENCOUNTER: ICD-10-CM

## 2022-05-24 DIAGNOSIS — S99.912A INJURY OF LEFT ANKLE, INITIAL ENCOUNTER: ICD-10-CM

## 2022-05-24 DIAGNOSIS — S99.912A INJURY OF LEFT ANKLE, INITIAL ENCOUNTER: Primary | ICD-10-CM

## 2022-05-24 PROCEDURE — 99212 OFFICE O/P EST SF 10 MIN: CPT | Performed by: STUDENT IN AN ORGANIZED HEALTH CARE EDUCATION/TRAINING PROGRAM

## 2022-05-24 PROCEDURE — 3078F DIAST BP <80 MM HG: CPT | Performed by: STUDENT IN AN ORGANIZED HEALTH CARE EDUCATION/TRAINING PROGRAM

## 2022-05-24 PROCEDURE — 3008F BODY MASS INDEX DOCD: CPT | Performed by: STUDENT IN AN ORGANIZED HEALTH CARE EDUCATION/TRAINING PROGRAM

## 2022-05-24 PROCEDURE — 73610 X-RAY EXAM OF ANKLE: CPT | Performed by: STUDENT IN AN ORGANIZED HEALTH CARE EDUCATION/TRAINING PROGRAM

## 2022-05-24 PROCEDURE — 3074F SYST BP LT 130 MM HG: CPT | Performed by: STUDENT IN AN ORGANIZED HEALTH CARE EDUCATION/TRAINING PROGRAM

## 2022-05-25 ENCOUNTER — OFFICE VISIT (OUTPATIENT)
Dept: ORTHOPEDICS CLINIC | Facility: CLINIC | Age: 62
End: 2022-05-25
Payer: COMMERCIAL

## 2022-05-25 VITALS — BODY MASS INDEX: 24.73 KG/M2 | HEIGHT: 69 IN | OXYGEN SATURATION: 99 % | HEART RATE: 88 BPM | WEIGHT: 167 LBS

## 2022-05-25 DIAGNOSIS — T14.8XXA AVULSION FRACTURE: Primary | ICD-10-CM

## 2022-05-25 PROCEDURE — 3008F BODY MASS INDEX DOCD: CPT | Performed by: PODIATRIST

## 2022-05-25 PROCEDURE — 99203 OFFICE O/P NEW LOW 30 MIN: CPT | Performed by: PODIATRIST

## 2022-06-14 ENCOUNTER — OFFICE VISIT (OUTPATIENT)
Dept: ORTHOPEDICS CLINIC | Facility: CLINIC | Age: 62
End: 2022-06-14
Payer: COMMERCIAL

## 2022-06-14 DIAGNOSIS — M25.472 LEFT ANKLE SWELLING: ICD-10-CM

## 2022-06-14 DIAGNOSIS — T14.8XXA AVULSION FRACTURE: Primary | ICD-10-CM

## 2022-06-14 PROCEDURE — 99213 OFFICE O/P EST LOW 20 MIN: CPT | Performed by: PODIATRIST

## 2022-06-14 NOTE — PROGRESS NOTES
[facility] Progress Note    Subjective:   Pt here for follow up of avulsion fracture dorsal midfoot/ankle left foot    Still some swelling medial ankle  She has been using the low-profile boot as recommended        Objective:     Fracture site no pain  Medial ankle area, mild swelling  Mild stiffness about the ankle                Assessment/Plan:     Diagnoses and all orders for this visit:    Avulsion fracture    Left ankle swelling      Slowly get out of the boot into her tennis shoe and an ankle sleeve for some of the swelling. Will call in a week if PT needed we can initiate over the phone/my chart  I would anticipate steady improvement over next few weeks            Coral Chacko. Bruno Luciano DPM  [facility]    [employer] speech recognition software was used to prepare this note. If a word or phrase is confusing, it is likely do to a failure of recognition. Please contact me with any questions or clarifications.

## 2022-06-18 DIAGNOSIS — E78.5 DYSLIPIDEMIA: Primary | ICD-10-CM

## 2022-06-20 RX ORDER — ATORVASTATIN CALCIUM 20 MG/1
TABLET, FILM COATED ORAL
Qty: 90 TABLET | Refills: 1 | Status: SHIPPED | OUTPATIENT
Start: 2022-06-20

## 2022-07-25 ENCOUNTER — HOSPITAL ENCOUNTER (OUTPATIENT)
Dept: BONE DENSITY | Age: 62
Discharge: HOME | End: 2022-07-25
Attending: FAMILY MEDICINE
Payer: COMMERCIAL

## 2022-07-25 DIAGNOSIS — Z13.820 SCREENING FOR OSTEOPOROSIS: ICD-10-CM

## 2022-07-25 PROCEDURE — 77080 DXA BONE DENSITY AXIAL: CPT | Performed by: FAMILY MEDICINE

## 2022-08-10 ENCOUNTER — TELEPHONE (OUTPATIENT)
Facility: LOCATION | Age: 62
End: 2022-08-10

## 2022-08-10 DIAGNOSIS — E04.1 THYROID NODULE: Primary | ICD-10-CM

## 2022-08-16 ENCOUNTER — HOSPITAL ENCOUNTER (OUTPATIENT)
Dept: ULTRASOUND IMAGING | Age: 62
Discharge: HOME | End: 2022-08-16
Attending: OTOLARYNGOLOGY
Payer: COMMERCIAL

## 2022-08-16 DIAGNOSIS — E04.1 THYROID NODULE: ICD-10-CM

## 2022-08-16 PROCEDURE — 76536 US EXAM OF HEAD AND NECK: CPT | Performed by: OTOLARYNGOLOGY

## 2022-08-19 ENCOUNTER — TELEPHONE (OUTPATIENT)
Facility: LOCATION | Age: 62
End: 2022-08-19

## 2022-08-19 NOTE — TELEPHONE ENCOUNTER
Has appt scheduled.  mp       Future Appointments   Date Time Provider Reena Parrish   9/7/2022  3:00 PM Yamini Soares MD [facility]   9/16/2022  9:30 AM Brandie Kim DO [facility]

## 2022-08-23 ENCOUNTER — OFFICE VISIT (OUTPATIENT)
Dept: FAMILY MEDICINE CLINIC | Facility: CLINIC | Age: 62
End: 2022-08-23
Payer: COMMERCIAL

## 2022-08-23 VITALS
HEART RATE: 83 BPM | OXYGEN SATURATION: 97 % | RESPIRATION RATE: 18 BRPM | WEIGHT: 176 LBS | DIASTOLIC BLOOD PRESSURE: 88 MMHG | SYSTOLIC BLOOD PRESSURE: 138 MMHG | HEIGHT: 69 IN | BODY MASS INDEX: 26.07 KG/M2

## 2022-08-23 DIAGNOSIS — R13.10 DYSPHAGIA, UNSPECIFIED TYPE: Primary | ICD-10-CM

## 2022-08-23 DIAGNOSIS — K21.9 GASTROESOPHAGEAL REFLUX DISEASE, UNSPECIFIED WHETHER ESOPHAGITIS PRESENT: ICD-10-CM

## 2022-08-23 PROCEDURE — 99212 OFFICE O/P EST SF 10 MIN: CPT | Performed by: STUDENT IN AN ORGANIZED HEALTH CARE EDUCATION/TRAINING PROGRAM

## 2022-08-23 PROCEDURE — 3008F BODY MASS INDEX DOCD: CPT | Performed by: STUDENT IN AN ORGANIZED HEALTH CARE EDUCATION/TRAINING PROGRAM

## 2022-08-23 PROCEDURE — 3079F DIAST BP 80-89 MM HG: CPT | Performed by: STUDENT IN AN ORGANIZED HEALTH CARE EDUCATION/TRAINING PROGRAM

## 2022-08-23 PROCEDURE — 3075F SYST BP GE 130 - 139MM HG: CPT | Performed by: STUDENT IN AN ORGANIZED HEALTH CARE EDUCATION/TRAINING PROGRAM

## 2022-08-23 RX ORDER — PANTOPRAZOLE SODIUM 40 MG/1
40 TABLET, DELAYED RELEASE ORAL
Qty: 90 TABLET | Refills: 0 | Status: SHIPPED | OUTPATIENT
Start: 2022-08-23

## 2022-08-29 ENCOUNTER — LAB ENCOUNTER (OUTPATIENT)
Dept: LAB | Age: 62
End: 2022-08-29
Attending: NURSE PRACTITIONER
Payer: COMMERCIAL

## 2022-08-29 DIAGNOSIS — Z01.818 PRE-OP TESTING: ICD-10-CM

## 2022-08-30 LAB — SARS-COV-2 RNA RESP QL NAA+PROBE: NOT DETECTED

## 2022-09-01 PROBLEM — R13.10 DYSPHAGIA: Status: ACTIVE | Noted: 2022-09-01

## 2022-09-06 DIAGNOSIS — I10 ESSENTIAL HYPERTENSION, BENIGN: ICD-10-CM

## 2022-09-07 RX ORDER — LISINOPRIL 5 MG/1
TABLET ORAL
Qty: 90 TABLET | Refills: 1 | Status: SHIPPED | OUTPATIENT
Start: 2022-09-07

## 2022-09-08 ENCOUNTER — LAB ENCOUNTER (OUTPATIENT)
Dept: LAB | Facility: REFERENCE LAB | Age: 62
End: 2022-09-08
Payer: COMMERCIAL

## 2022-09-08 DIAGNOSIS — Z00.00 LABORATORY EXAMINATION ORDERED AS PART OF A ROUTINE GENERAL MEDICAL EXAMINATION: ICD-10-CM

## 2022-09-08 DIAGNOSIS — E55.9 VITAMIN D DEFICIENCY: ICD-10-CM

## 2022-09-08 LAB
ALBUMIN SERPL-MCNC: 3.9 G/DL (ref 3.4–5)
ALBUMIN/GLOB SERPL: 1.2 {RATIO} (ref 1–2)
ALP LIVER SERPL-CCNC: 52 U/L
ALT SERPL-CCNC: 39 U/L
ANION GAP SERPL CALC-SCNC: 6 MMOL/L (ref 0–18)
AST SERPL-CCNC: 30 U/L (ref 15–37)
BASOPHILS # BLD AUTO: 0.04 X10(3) UL (ref 0–0.2)
BASOPHILS NFR BLD AUTO: 0.8 %
BILIRUB SERPL-MCNC: 0.4 MG/DL (ref 0.1–2)
BUN BLD-MCNC: 13 MG/DL (ref 7–18)
BUN/CREAT SERPL: 15.7 (ref 10–20)
CALCIUM BLD-MCNC: 9.3 MG/DL (ref 8.5–10.1)
CHLORIDE SERPL-SCNC: 108 MMOL/L (ref 98–112)
CHOLEST SERPL-MCNC: 218 MG/DL (ref ?–200)
CO2 SERPL-SCNC: 27 MMOL/L (ref 21–32)
CREAT BLD-MCNC: 0.83 MG/DL
DEPRECATED RDW RBC AUTO: 43 FL (ref 35.1–46.3)
EOSINOPHIL # BLD AUTO: 0.17 X10(3) UL (ref 0–0.7)
EOSINOPHIL NFR BLD AUTO: 3.4 %
ERYTHROCYTE [DISTWIDTH] IN BLOOD BY AUTOMATED COUNT: 12.4 % (ref 11–15)
FASTING PATIENT LIPID ANSWER: YES
FASTING STATUS PATIENT QL REPORTED: YES
GFR SERPLBLD BASED ON 1.73 SQ M-ARVRAT: 81 ML/MIN/1.73M2 (ref 60–?)
GLOBULIN PLAS-MCNC: 3.3 G/DL (ref 2.8–4.4)
GLUCOSE BLD-MCNC: 87 MG/DL (ref 70–99)
HCT VFR BLD AUTO: 38 %
HDLC SERPL-MCNC: 65 MG/DL (ref 40–59)
HGB BLD-MCNC: 12.7 G/DL
IMM GRANULOCYTES # BLD AUTO: 0.01 X10(3) UL (ref 0–1)
IMM GRANULOCYTES NFR BLD: 0.2 %
LDLC SERPL CALC-MCNC: 122 MG/DL (ref ?–100)
LYMPHOCYTES # BLD AUTO: 1.39 X10(3) UL (ref 1–4)
LYMPHOCYTES NFR BLD AUTO: 27.6 %
MCH RBC QN AUTO: 31.7 PG (ref 26–34)
MCHC RBC AUTO-ENTMCNC: 33.4 G/DL (ref 31–37)
MCV RBC AUTO: 94.8 FL
MONOCYTES # BLD AUTO: 0.38 X10(3) UL (ref 0.1–1)
MONOCYTES NFR BLD AUTO: 7.5 %
NEUTROPHILS # BLD AUTO: 3.05 X10 (3) UL (ref 1.5–7.7)
NEUTROPHILS # BLD AUTO: 3.05 X10(3) UL (ref 1.5–7.7)
NEUTROPHILS NFR BLD AUTO: 60.5 %
NONHDLC SERPL-MCNC: 153 MG/DL (ref ?–130)
OSMOLALITY SERPL CALC.SUM OF ELEC: 291 MOSM/KG (ref 275–295)
PLATELET # BLD AUTO: 320 10(3)UL (ref 150–450)
POTASSIUM SERPL-SCNC: 4.2 MMOL/L (ref 3.5–5.1)
PROT SERPL-MCNC: 7.2 G/DL (ref 6.4–8.2)
RBC # BLD AUTO: 4.01 X10(6)UL
SODIUM SERPL-SCNC: 141 MMOL/L (ref 136–145)
TRIGL SERPL-MCNC: 180 MG/DL (ref 30–149)
TSI SER-ACNC: 2.64 MIU/ML (ref 0.36–3.74)
VIT D+METAB SERPL-MCNC: 58 NG/ML (ref 30–100)
VLDLC SERPL CALC-MCNC: 32 MG/DL (ref 0–30)
WBC # BLD AUTO: 5 X10(3) UL (ref 4–11)

## 2022-09-08 PROCEDURE — 80061 LIPID PANEL: CPT | Performed by: FAMILY MEDICINE

## 2022-09-08 PROCEDURE — 82306 VITAMIN D 25 HYDROXY: CPT | Performed by: FAMILY MEDICINE

## 2022-09-08 PROCEDURE — 80050 GENERAL HEALTH PANEL: CPT | Performed by: FAMILY MEDICINE

## 2022-09-12 ENCOUNTER — OFFICE VISIT (OUTPATIENT)
Facility: LOCATION | Age: 62
End: 2022-09-12
Payer: COMMERCIAL

## 2022-09-12 DIAGNOSIS — E04.2 MULTIPLE THYROID NODULES: Primary | ICD-10-CM

## 2022-09-12 DIAGNOSIS — H69.83 EUSTACHIAN TUBE DYSFUNCTION, BILATERAL: ICD-10-CM

## 2022-09-12 RX ORDER — FLUTICASONE PROPIONATE 50 MCG
1 SPRAY, SUSPENSION (ML) NASAL 2 TIMES DAILY
Qty: 16 G | Refills: 3 | Status: SHIPPED | OUTPATIENT
Start: 2022-09-12

## 2022-09-16 ENCOUNTER — OFFICE VISIT (OUTPATIENT)
Dept: FAMILY MEDICINE CLINIC | Facility: CLINIC | Age: 62
End: 2022-09-16
Payer: COMMERCIAL

## 2022-09-16 VITALS
HEART RATE: 84 BPM | SYSTOLIC BLOOD PRESSURE: 104 MMHG | HEIGHT: 69.5 IN | RESPIRATION RATE: 16 BRPM | TEMPERATURE: 97 F | OXYGEN SATURATION: 100 % | DIASTOLIC BLOOD PRESSURE: 60 MMHG | WEIGHT: 175 LBS | BODY MASS INDEX: 25.34 KG/M2

## 2022-09-16 DIAGNOSIS — M54.42 CHRONIC LEFT-SIDED LOW BACK PAIN WITH LEFT-SIDED SCIATICA: ICD-10-CM

## 2022-09-16 DIAGNOSIS — G89.29 CHRONIC LEFT-SIDED LOW BACK PAIN WITH LEFT-SIDED SCIATICA: ICD-10-CM

## 2022-09-16 DIAGNOSIS — Z13.89 SCREENING FOR GENITOURINARY CONDITION: ICD-10-CM

## 2022-09-16 DIAGNOSIS — E78.5 DYSLIPIDEMIA: ICD-10-CM

## 2022-09-16 DIAGNOSIS — I10 ESSENTIAL HYPERTENSION, BENIGN: ICD-10-CM

## 2022-09-16 DIAGNOSIS — M85.89 OSTEOPENIA OF MULTIPLE SITES: ICD-10-CM

## 2022-09-16 DIAGNOSIS — Z00.00 ROUTINE GENERAL MEDICAL EXAMINATION AT A HEALTH CARE FACILITY: Primary | ICD-10-CM

## 2022-09-16 DIAGNOSIS — Z71.85 VACCINE COUNSELING: ICD-10-CM

## 2022-09-16 LAB
BILIRUB UR QL STRIP.AUTO: NEGATIVE
CLARITY UR REFRACT.AUTO: CLEAR
GLUCOSE UR STRIP.AUTO-MCNC: NEGATIVE MG/DL
KETONES UR STRIP.AUTO-MCNC: NEGATIVE MG/DL
LEUKOCYTE ESTERASE UR QL STRIP.AUTO: NEGATIVE
NITRITE UR QL STRIP.AUTO: NEGATIVE
PH UR STRIP.AUTO: 7 [PH] (ref 5–8)
PROT UR STRIP.AUTO-MCNC: NEGATIVE MG/DL
RBC UR QL AUTO: NEGATIVE
SP GR UR STRIP.AUTO: 1 (ref 1–1.03)
UROBILINOGEN UR STRIP.AUTO-MCNC: <2 MG/DL

## 2022-09-16 PROCEDURE — 90471 IMMUNIZATION ADMIN: CPT | Performed by: FAMILY MEDICINE

## 2022-09-16 PROCEDURE — 3008F BODY MASS INDEX DOCD: CPT | Performed by: FAMILY MEDICINE

## 2022-09-16 PROCEDURE — 81003 URINALYSIS AUTO W/O SCOPE: CPT | Performed by: FAMILY MEDICINE

## 2022-09-16 PROCEDURE — 90677 PCV20 VACCINE IM: CPT | Performed by: FAMILY MEDICINE

## 2022-09-16 PROCEDURE — 99396 PREV VISIT EST AGE 40-64: CPT | Performed by: FAMILY MEDICINE

## 2022-09-16 PROCEDURE — 3074F SYST BP LT 130 MM HG: CPT | Performed by: FAMILY MEDICINE

## 2022-09-16 PROCEDURE — 3078F DIAST BP <80 MM HG: CPT | Performed by: FAMILY MEDICINE

## 2022-09-16 RX ORDER — ALENDRONATE SODIUM 70 MG/1
70 TABLET ORAL
Qty: 12 TABLET | Refills: 1 | Status: SHIPPED | OUTPATIENT
Start: 2022-09-16

## 2022-09-27 ENCOUNTER — TELEPHONE (OUTPATIENT)
Dept: PHYSICAL THERAPY | Facility: HOSPITAL | Age: 62
End: 2022-09-27

## 2022-09-28 ENCOUNTER — OFFICE VISIT (OUTPATIENT)
Dept: PHYSICAL THERAPY | Age: 62
End: 2022-09-28
Attending: FAMILY MEDICINE

## 2022-09-28 DIAGNOSIS — M54.42 CHRONIC LEFT-SIDED LOW BACK PAIN WITH LEFT-SIDED SCIATICA: ICD-10-CM

## 2022-09-28 DIAGNOSIS — G89.29 CHRONIC LEFT-SIDED LOW BACK PAIN WITH LEFT-SIDED SCIATICA: ICD-10-CM

## 2022-09-28 PROCEDURE — 97110 THERAPEUTIC EXERCISES: CPT | Performed by: PHYSICAL THERAPIST

## 2022-09-28 PROCEDURE — 97161 PT EVAL LOW COMPLEX 20 MIN: CPT | Performed by: PHYSICAL THERAPIST

## 2022-09-30 ENCOUNTER — OFFICE VISIT (OUTPATIENT)
Dept: PHYSICAL THERAPY | Age: 62
End: 2022-09-30
Attending: FAMILY MEDICINE

## 2022-09-30 PROCEDURE — 97110 THERAPEUTIC EXERCISES: CPT | Performed by: PHYSICAL THERAPIST

## 2022-10-03 ENCOUNTER — APPOINTMENT (OUTPATIENT)
Dept: PHYSICAL THERAPY | Age: 62
End: 2022-10-03
Attending: FAMILY MEDICINE
Payer: COMMERCIAL

## 2022-10-06 ENCOUNTER — OFFICE VISIT (OUTPATIENT)
Dept: PHYSICAL THERAPY | Age: 62
End: 2022-10-06
Attending: FAMILY MEDICINE
Payer: COMMERCIAL

## 2022-10-06 PROCEDURE — 97110 THERAPEUTIC EXERCISES: CPT | Performed by: PHYSICAL THERAPIST

## 2022-10-12 ENCOUNTER — OFFICE VISIT (OUTPATIENT)
Dept: PHYSICAL THERAPY | Age: 62
End: 2022-10-12
Attending: FAMILY MEDICINE
Payer: COMMERCIAL

## 2022-10-12 PROCEDURE — 97110 THERAPEUTIC EXERCISES: CPT | Performed by: PHYSICAL THERAPIST

## 2022-10-19 ENCOUNTER — OFFICE VISIT (OUTPATIENT)
Dept: PHYSICAL THERAPY | Age: 62
End: 2022-10-19
Attending: FAMILY MEDICINE
Payer: COMMERCIAL

## 2022-10-19 PROCEDURE — 97110 THERAPEUTIC EXERCISES: CPT | Performed by: PHYSICAL THERAPIST

## 2022-10-24 ENCOUNTER — TELEPHONE (OUTPATIENT)
Dept: PHYSICAL THERAPY | Facility: HOSPITAL | Age: 62
End: 2022-10-24

## 2022-10-26 ENCOUNTER — TELEPHONE (OUTPATIENT)
Dept: PHYSICAL THERAPY | Facility: HOSPITAL | Age: 62
End: 2022-10-26

## 2022-10-27 ENCOUNTER — OFFICE VISIT (OUTPATIENT)
Dept: PHYSICAL THERAPY | Age: 62
End: 2022-10-27
Attending: FAMILY MEDICINE
Payer: COMMERCIAL

## 2022-10-27 PROCEDURE — 97110 THERAPEUTIC EXERCISES: CPT | Performed by: PHYSICAL THERAPIST

## 2022-11-02 ENCOUNTER — OFFICE VISIT (OUTPATIENT)
Dept: PHYSICAL THERAPY | Age: 62
End: 2022-11-02
Attending: FAMILY MEDICINE
Payer: COMMERCIAL

## 2022-11-02 PROCEDURE — 97110 THERAPEUTIC EXERCISES: CPT | Performed by: PHYSICAL THERAPIST

## 2022-11-16 ENCOUNTER — OFFICE VISIT (OUTPATIENT)
Dept: FAMILY MEDICINE CLINIC | Facility: CLINIC | Age: 62
End: 2022-11-16
Payer: COMMERCIAL

## 2022-11-16 VITALS
HEART RATE: 91 BPM | DIASTOLIC BLOOD PRESSURE: 72 MMHG | OXYGEN SATURATION: 97 % | SYSTOLIC BLOOD PRESSURE: 114 MMHG | WEIGHT: 175 LBS | HEIGHT: 70 IN | RESPIRATION RATE: 16 BRPM | BODY MASS INDEX: 25.05 KG/M2

## 2022-11-16 DIAGNOSIS — R93.1 ABNORMAL CT SCAN OF HEART: ICD-10-CM

## 2022-11-16 DIAGNOSIS — R91.1 LUNG NODULE: ICD-10-CM

## 2022-11-16 DIAGNOSIS — K21.9 GASTROESOPHAGEAL REFLUX DISEASE, UNSPECIFIED WHETHER ESOPHAGITIS PRESENT: ICD-10-CM

## 2022-11-16 DIAGNOSIS — E78.5 DYSLIPIDEMIA: ICD-10-CM

## 2022-11-16 DIAGNOSIS — E55.9 VITAMIN D DEFICIENCY: ICD-10-CM

## 2022-11-16 DIAGNOSIS — L30.9 ECZEMA, UNSPECIFIED TYPE: ICD-10-CM

## 2022-11-16 DIAGNOSIS — E04.2 MULTIPLE THYROID NODULES: ICD-10-CM

## 2022-11-16 DIAGNOSIS — G43.009 MIGRAINE WITHOUT AURA AND WITHOUT STATUS MIGRAINOSUS, NOT INTRACTABLE: ICD-10-CM

## 2022-11-16 DIAGNOSIS — N64.4 BREAST PAIN, LEFT: ICD-10-CM

## 2022-11-16 DIAGNOSIS — L71.9 ROSACEA: ICD-10-CM

## 2022-11-16 DIAGNOSIS — Z12.31 SCREENING MAMMOGRAM FOR BREAST CANCER: Primary | ICD-10-CM

## 2022-11-16 DIAGNOSIS — M54.42 CHRONIC LEFT-SIDED LOW BACK PAIN WITH LEFT-SIDED SCIATICA: ICD-10-CM

## 2022-11-16 DIAGNOSIS — M51.36 DDD (DEGENERATIVE DISC DISEASE), LUMBAR: ICD-10-CM

## 2022-11-16 DIAGNOSIS — Z79.899 MEDICATION MANAGEMENT: ICD-10-CM

## 2022-11-16 DIAGNOSIS — I10 ESSENTIAL HYPERTENSION, BENIGN: ICD-10-CM

## 2022-11-16 DIAGNOSIS — Z71.85 VACCINE COUNSELING: ICD-10-CM

## 2022-11-16 DIAGNOSIS — I25.10 CORONARY ARTERY DISEASE INVOLVING NATIVE CORONARY ARTERY OF NATIVE HEART WITHOUT ANGINA PECTORIS: ICD-10-CM

## 2022-11-16 DIAGNOSIS — M85.89 OSTEOPENIA OF MULTIPLE SITES: ICD-10-CM

## 2022-11-16 DIAGNOSIS — G89.29 CHRONIC LEFT-SIDED LOW BACK PAIN WITH LEFT-SIDED SCIATICA: ICD-10-CM

## 2022-11-16 DIAGNOSIS — M54.32 LEFT SIDED SCIATICA: ICD-10-CM

## 2022-11-16 PROCEDURE — 3078F DIAST BP <80 MM HG: CPT | Performed by: FAMILY MEDICINE

## 2022-11-16 PROCEDURE — 3074F SYST BP LT 130 MM HG: CPT | Performed by: FAMILY MEDICINE

## 2022-11-16 PROCEDURE — 3008F BODY MASS INDEX DOCD: CPT | Performed by: FAMILY MEDICINE

## 2022-11-16 PROCEDURE — 99214 OFFICE O/P EST MOD 30 MIN: CPT | Performed by: FAMILY MEDICINE

## 2022-12-07 ENCOUNTER — HOSPITAL ENCOUNTER (OUTPATIENT)
Dept: MRI IMAGING | Age: 62
Discharge: HOME | End: 2022-12-07
Attending: FAMILY MEDICINE
Payer: COMMERCIAL

## 2022-12-07 DIAGNOSIS — M54.32 LEFT SIDED SCIATICA: ICD-10-CM

## 2022-12-07 DIAGNOSIS — M51.36 DDD (DEGENERATIVE DISC DISEASE), LUMBAR: ICD-10-CM

## 2022-12-07 PROCEDURE — 72148 MRI LUMBAR SPINE W/O DYE: CPT | Performed by: FAMILY MEDICINE

## 2022-12-08 DIAGNOSIS — M54.32 LEFT SIDED SCIATICA: ICD-10-CM

## 2022-12-08 DIAGNOSIS — M54.42 CHRONIC LEFT-SIDED LOW BACK PAIN WITH LEFT-SIDED SCIATICA: ICD-10-CM

## 2022-12-08 DIAGNOSIS — K66.9: ICD-10-CM

## 2022-12-08 DIAGNOSIS — G89.29 CHRONIC LEFT-SIDED LOW BACK PAIN WITH LEFT-SIDED SCIATICA: ICD-10-CM

## 2022-12-08 DIAGNOSIS — M51.36 DDD (DEGENERATIVE DISC DISEASE), LUMBAR: Primary | ICD-10-CM

## 2022-12-09 RX ORDER — FLUTICASONE PROPIONATE 50 MCG
SPRAY, SUSPENSION (ML) NASAL
Qty: 48 ML | Refills: 1 | Status: SHIPPED | OUTPATIENT
Start: 2022-12-09

## 2022-12-10 DIAGNOSIS — E78.5 DYSLIPIDEMIA: ICD-10-CM

## 2022-12-12 RX ORDER — ATORVASTATIN CALCIUM 20 MG/1
TABLET, FILM COATED ORAL
Qty: 90 TABLET | Refills: 1 | Status: SHIPPED | OUTPATIENT
Start: 2022-12-12

## 2022-12-14 ENCOUNTER — TELEPHONE (OUTPATIENT)
Dept: NEUROLOGY | Facility: CLINIC | Age: 62
End: 2022-12-14

## 2022-12-14 ENCOUNTER — OFFICE VISIT (OUTPATIENT)
Dept: PAIN CLINIC | Facility: CLINIC | Age: 62
End: 2022-12-14
Payer: COMMERCIAL

## 2022-12-14 VITALS
BODY MASS INDEX: 25 KG/M2 | SYSTOLIC BLOOD PRESSURE: 110 MMHG | OXYGEN SATURATION: 98 % | DIASTOLIC BLOOD PRESSURE: 72 MMHG | WEIGHT: 175 LBS | HEART RATE: 95 BPM

## 2022-12-14 DIAGNOSIS — M54.16 LUMBAR RADICULOPATHY: Primary | ICD-10-CM

## 2022-12-14 DIAGNOSIS — M43.06 PARS DEFECT OF LUMBAR SPINE: ICD-10-CM

## 2022-12-14 PROCEDURE — 3078F DIAST BP <80 MM HG: CPT | Performed by: ANESTHESIOLOGY

## 2022-12-14 PROCEDURE — 3074F SYST BP LT 130 MM HG: CPT | Performed by: ANESTHESIOLOGY

## 2022-12-14 PROCEDURE — 99204 OFFICE O/P NEW MOD 45 MIN: CPT | Performed by: ANESTHESIOLOGY

## 2022-12-14 RX ORDER — GABAPENTIN 300 MG/1
300 CAPSULE ORAL NIGHTLY
Qty: 30 CAPSULE | Refills: 0 | Status: SHIPPED | OUTPATIENT
Start: 2022-12-14

## 2022-12-19 ENCOUNTER — HOSPITAL ENCOUNTER (OUTPATIENT)
Dept: CT IMAGING | Facility: HOSPITAL | Age: 62
Discharge: HOME | End: 2022-12-19
Attending: FAMILY MEDICINE
Payer: COMMERCIAL

## 2022-12-19 DIAGNOSIS — K66.9: ICD-10-CM

## 2022-12-19 LAB
CREAT BLD-MCNC: 1.1 MG/DL
GFR SERPLBLD BASED ON 1.73 SQ M-ARVRAT: 58 ML/MIN/1.73M2 (ref 60–?)

## 2022-12-19 PROCEDURE — 74177 CT ABD & PELVIS W/CONTRAST: CPT | Performed by: FAMILY MEDICINE

## 2022-12-19 PROCEDURE — 82565 ASSAY OF CREATININE: CPT

## 2022-12-19 NOTE — PROGRESS NOTES
Dear Simón Dennis,    Your CT of the abdomen and pelvis is within normal limits. A renal cyst is benign.       Sincerely,  Dr. Ariel Parks

## 2022-12-29 ENCOUNTER — APPOINTMENT (OUTPATIENT)
Dept: GENERAL RADIOLOGY | Facility: HOSPITAL | Age: 62
End: 2022-12-29
Attending: ANESTHESIOLOGY
Payer: COMMERCIAL

## 2022-12-29 ENCOUNTER — HOSPITAL ENCOUNTER (OUTPATIENT)
Facility: HOSPITAL | Age: 62
Setting detail: HOSPITAL OUTPATIENT SURGERY
Discharge: HOME | End: 2022-12-29
Attending: ANESTHESIOLOGY | Admitting: ANESTHESIOLOGY
Payer: COMMERCIAL

## 2022-12-29 VITALS
SYSTOLIC BLOOD PRESSURE: 145 MMHG | HEART RATE: 80 BPM | DIASTOLIC BLOOD PRESSURE: 88 MMHG | BODY MASS INDEX: 25.05 KG/M2 | RESPIRATION RATE: 18 BRPM | WEIGHT: 175 LBS | TEMPERATURE: 98 F | OXYGEN SATURATION: 99 % | HEIGHT: 70 IN

## 2022-12-29 PROCEDURE — 3E0R37Z INTRODUCTION OF ELECTROLYTIC AND WATER BALANCE SUBSTANCE INTO SPINAL CANAL, PERCUTANEOUS APPROACH: ICD-10-PCS | Performed by: ANESTHESIOLOGY

## 2022-12-29 PROCEDURE — 3E0R33Z INTRODUCTION OF ANTI-INFLAMMATORY INTO SPINAL CANAL, PERCUTANEOUS APPROACH: ICD-10-PCS | Performed by: ANESTHESIOLOGY

## 2022-12-29 PROCEDURE — 64483 NJX AA&/STRD TFRM EPI L/S 1: CPT | Performed by: ANESTHESIOLOGY

## 2022-12-29 RX ORDER — LIDOCAINE HYDROCHLORIDE 10 MG/ML
INJECTION, SOLUTION EPIDURAL; INFILTRATION; INTRACAUDAL; PERINEURAL
Status: DISCONTINUED | OUTPATIENT
Start: 2022-12-29 | End: 2022-12-29

## 2022-12-29 RX ORDER — SODIUM CHLORIDE 9 MG/ML
INJECTION INTRAVENOUS
Status: DISCONTINUED | OUTPATIENT
Start: 2022-12-29 | End: 2022-12-29

## 2022-12-29 RX ORDER — DEXAMETHASONE SODIUM PHOSPHATE 10 MG/ML
INJECTION, SOLUTION INTRAMUSCULAR; INTRAVENOUS
Status: DISCONTINUED | OUTPATIENT
Start: 2022-12-29 | End: 2022-12-29

## 2022-12-29 NOTE — DISCHARGE INSTRUCTIONS
Home Care Instructions Following Your Pain Procedure     Rosario Side,  It has been a pleasure to have you as our patient. To help you at home, you must follow these general discharge instructions. We will review these with you before you are discharged. It is our hope that you have a complete and uneventful recovery from our procedure. General Instructions:  What to Expect:  Bandages from your procedure today can be removed when you get home. Please avoid soaking and/or swimming for 24 hours. Showering is okay  It is normal to have increased pain symptoms and/or pain at injection site for up to 3-5 days after procedure, you can use heat or ice (20 minutes on 20 minutes off) for comfort. You may experience some temporary side effects which may include restlessness or insomnia, flushing of the face, or heart palpitations. Please contact the provider if these symptoms do not resolve within 3-4 days. Lightheadedness or nausea may occur and should resolve within 24 to 48 hours. If you develop a headache after treatment, rest, drink fluids (with caffeine, if possible) and take mild over-the-counter pain medication. If the headache does not improve with the above treatment, contact the physician. Home Medications:  Resume all previously prescribed medication. Please avoid taking NSAIDs (Non-Steriodal Anti-Inflammatory Drugs) such as:  Ibuprofen ( Advil, Motrin) Aleve (Naproxen), Diclofenac, Meloxicam for 6 hours after procedure. If you are on Coumadin (Warfarin) or any other anti-coagulant (or \"blood thinning\") medication such as Plavix (Clopidogrel), Xarelto (Rivaroxaban), Eliquis (Apixaban), Effient (Prasugrel) etc., restart on the following day from the procedure unless otherwise directed by your provider. If you are a diabetic, please increase the frequency of your glucose monitoring after the procedure as steroids may cause a temporary (2-3 day) increase in your blood sugar.   Contact your primary care physician if your blood sugar remains elevated as you may require some medication adjustment. Diet:  Resume your regular diet as tolerated. Activity: We recommend that you relax and rest during the rest of your procedure day. If you feel weakness in your arms or legs do not drive. Follow-up Appointment  Please schedule a follow-up visit within 3 to 4 weeks after your last procedure date. Question or Concerns:  Feel free to call our office with any questions or concerns at [phone] (option #2)    Regino June  Thank you for coming to [facility] for your procedure. The nurses try very hard to make sure you receive the best care possible. Your trust in them as well as us is greatly appreciated.     Thanks so much,   Dr. Eric Zhao

## 2022-12-29 NOTE — OPERATIVE REPORT
[facility]  Operative Report  2022     Ramiro Blackwell Patient Status:  Hospital Outpatient Surgery    1960 MRN [id number]   Location [address] [address] Attending Jacob Manning MD   Hosp Day # 0 PCP Wynn Cooks, DO     Indication: Regino June is a 63year old female with lumbar radiculopathy    Preoperative Diagnosis:  Lumbar radiculopathy [M54.16]    Postoperative Diagnosis: Same as above. Procedure performed: LEFT LUMBAR 5 TRANSFORAMINAL EPIDURAL STEROID INJECTION SINGLE LEVEL WITH LOCAL    Anesthesia: Local    EBL: Less than 1 ml. Procedure Description:   After reviewing the patient's history and performing a focused physical examination, the diagnosis was confirmed and contraindications such as infection and coagulopathy were ruled out. Following review of potential side effects and complications, including but not necessarily limited to infection, allergic reaction, local tissue breakdown, nerve injury, and paresis, the patient indicated they understood and agreed to proceed. After obtaining the informed consent, the patient was brought to the procedure room and monitored. In the prone position, following sterile prep and drape of the lumbar region, the L5 neural foramen was identified under fluoroscopy. The skin and subcutaneous tissue was anesthetized via 25-gauge 1.5\" needle with approximately 2 cc of 1% lidocaine. A 22-gauge 5\" Quincke spinal needle was introduced toward the inferior aspect of the junction between the transverse process and pedicle of the L5 level atraumatically under fluoroscopic guidance. The needle was advanced into the anterior epidural space at this level. The needle position was confirmed under AP and lateral fluoroscopic view. Following negative aspiration for CSF and blood, approximately 1 cc of Omnipaque 240 was injected. An excellent contrast spread along the epidural space and the nerve root was obtained.   At this point, 2 cc of normal saline with 10 mg of dexamethasone was injected without complication. The needle was withdrawn with stylet in situ. The patient tolerated procedure very well. The patient was observed until discharge criteria met. Discharge instructions were given and patient was released to a responsible adult. Complications: None. Follow up: The patient was followed in the pain clinic as needed basis.       Lyndsay Bradshaw MD

## 2022-12-30 ENCOUNTER — TELEPHONE (OUTPATIENT)
Dept: PAIN CLINIC | Facility: CLINIC | Age: 62
End: 2022-12-30

## 2022-12-30 NOTE — TELEPHONE ENCOUNTER
Munira called placed to patient for post procedure follow up. Patient stated she is doing well, had a headache yesterday but it has mostly gone away. Pt states she has some pain near injection site but this is manageable. Informed patient that soreness is to be expected after the procedure. Educated patient that it takes 3-5 days for the steroid to be effective and to allow adequate time for medication to work. Encouraged patient to alternate ice and heat and to take medications as prescribed. Pt asked if she should can return to normal activities today, advised that she can resume her normal routine however she should try to avoid excessive activity. Pt asked if she should continue taking gabapentin, advised her to continue taking medications as prescribed, if changes are needed this can be discussed at follow-up visit. Pt verbalized understanding to call with any questions or concerns.       Procedure: LEFT LUMBAR 5 TRANSFORAMINAL EPIDURAL STEROID INJECTION SINGLE LEVEL WITH LOCAL  Date: 12/29/22  Follow up Visit Scheduled: 1/13/23 9:45am

## 2023-01-13 ENCOUNTER — OFFICE VISIT (OUTPATIENT)
Dept: PAIN CLINIC | Facility: CLINIC | Age: 63
End: 2023-01-13
Payer: COMMERCIAL

## 2023-01-13 VITALS — DIASTOLIC BLOOD PRESSURE: 80 MMHG | SYSTOLIC BLOOD PRESSURE: 130 MMHG | OXYGEN SATURATION: 99 % | HEART RATE: 95 BPM

## 2023-01-13 DIAGNOSIS — M54.16 LUMBAR RADICULOPATHY: Primary | ICD-10-CM

## 2023-01-13 PROCEDURE — 99214 OFFICE O/P EST MOD 30 MIN: CPT | Performed by: ANESTHESIOLOGY

## 2023-01-13 PROCEDURE — 3079F DIAST BP 80-89 MM HG: CPT | Performed by: ANESTHESIOLOGY

## 2023-01-13 PROCEDURE — 3075F SYST BP GE 130 - 139MM HG: CPT | Performed by: ANESTHESIOLOGY

## 2023-01-13 RX ORDER — GABAPENTIN 300 MG/1
300 CAPSULE ORAL NIGHTLY
Qty: 30 CAPSULE | Refills: 0 | Status: SHIPPED | OUTPATIENT
Start: 2023-01-13

## 2023-02-10 RX ORDER — GABAPENTIN 300 MG/1
CAPSULE ORAL
Qty: 30 CAPSULE | Refills: 0 | Status: SHIPPED | OUTPATIENT
Start: 2023-02-10

## 2023-02-22 ENCOUNTER — TELEPHONE (OUTPATIENT)
Dept: FAMILY MEDICINE CLINIC | Facility: CLINIC | Age: 63
End: 2023-02-22

## 2023-02-22 ENCOUNTER — TELEMEDICINE (OUTPATIENT)
Dept: FAMILY MEDICINE CLINIC | Facility: CLINIC | Age: 63
End: 2023-02-22
Payer: COMMERCIAL

## 2023-02-22 DIAGNOSIS — U07.1 COVID: Primary | ICD-10-CM

## 2023-02-22 PROCEDURE — 99213 OFFICE O/P EST LOW 20 MIN: CPT | Performed by: FAMILY MEDICINE

## 2023-02-22 RX ORDER — SODIUM SULFACETAMIDE AND SULFUR 80; 40 MG/ML; MG/ML
1 LOTION TOPICAL DAILY
COMMUNITY
Start: 2023-02-01

## 2023-02-22 NOTE — TELEPHONE ENCOUNTER
Home test covid positve today   Sx onset yesterday   Congestion   Fever 99 (97) -Tyllenol   H/a   Coughing  Mild , denies SOB   No GI sx  Fatigue but up and about  Wants Paxlovid     Please advise can add for evisit     CDC guidelines advised  Discussed available therapeutics   Needed to be evaluate    Advised supportive measures at this time--rest, hydration, Vit D, Zinc, Vit C, lozenges for sore throat, warm salty gargles, hot tea with honey/lemon, Mucinex for cough as needed, anti histamine daily as needed, saline spray, humidifier, steam bath for congestion, Tylenol alternate Ibu if with fever and body aches as needed. With verbalized understanding. Go to ER if with chest pain, SOB, worsening fatigue, low oxygen level, with verbalized understanding.      Call update in 2 days

## 2023-02-24 NOTE — TELEPHONE ENCOUNTER
Feel better as reported   Mild cough  More energy  Denies chest pain, SOB, chest pressure  Advised to cont supp measures   F/u as needed   Go to ER with SOB, chest pain, chest pressure   With verbalized understanding   Hector

## 2023-02-26 DIAGNOSIS — M85.89 OSTEOPENIA OF MULTIPLE SITES: Primary | ICD-10-CM

## 2023-02-28 RX ORDER — ALENDRONATE SODIUM 70 MG/1
70 TABLET ORAL
Qty: 12 TABLET | Refills: 1 | Status: SHIPPED | OUTPATIENT
Start: 2023-02-28

## 2023-03-07 ENCOUNTER — TELEPHONE (OUTPATIENT)
Dept: FAMILY MEDICINE CLINIC | Facility: CLINIC | Age: 63
End: 2023-03-07

## 2023-03-07 NOTE — TELEPHONE ENCOUNTER
Spoke to patient. Had Covid in 2/21/23. For the past 10 days she's been having runny nose (clear mucus), hoarse throat, and mild dry cough. Tested negative last Thurs and last Fri. Denies fever, or any other symptoms other than her ears are \"muffled\". Started playing trumpet again Fri (has concert Mon). Takes Claritin daily. Has Flonase on hand. Told her to start that, and maybe switch antihistamines. Any other suggestions?

## 2023-03-07 NOTE — TELEPHONE ENCOUNTER
Jony Borrego had covid in Feb, and she is having lingering symptoms, she would like to talk to a nurse, please call Jony Borrego at [phone]

## 2023-03-13 RX ORDER — GABAPENTIN 300 MG/1
CAPSULE ORAL
Qty: 30 CAPSULE | Refills: 0 | Status: SHIPPED | OUTPATIENT
Start: 2023-03-13

## 2023-03-15 ENCOUNTER — LAB ENCOUNTER (OUTPATIENT)
Dept: LAB | Facility: REFERENCE LAB | Age: 63
End: 2023-03-15
Attending: FAMILY MEDICINE
Payer: COMMERCIAL

## 2023-03-15 DIAGNOSIS — E78.5 DYSLIPIDEMIA: ICD-10-CM

## 2023-03-15 DIAGNOSIS — I10 ESSENTIAL HYPERTENSION, BENIGN: ICD-10-CM

## 2023-03-15 LAB
ALBUMIN SERPL-MCNC: 4.1 G/DL (ref 3.4–5)
ALBUMIN/GLOB SERPL: 1.2 {RATIO} (ref 1–2)
ALP LIVER SERPL-CCNC: 50 U/L
ALT SERPL-CCNC: 29 U/L
ANION GAP SERPL CALC-SCNC: 6 MMOL/L (ref 0–18)
AST SERPL-CCNC: 26 U/L (ref 15–37)
BILIRUB SERPL-MCNC: 0.4 MG/DL (ref 0.1–2)
BUN BLD-MCNC: 10 MG/DL (ref 7–18)
BUN/CREAT SERPL: 11 (ref 10–20)
CALCIUM BLD-MCNC: 9.4 MG/DL (ref 8.5–10.1)
CHLORIDE SERPL-SCNC: 106 MMOL/L (ref 98–112)
CHOLEST SERPL-MCNC: 200 MG/DL (ref ?–200)
CO2 SERPL-SCNC: 30 MMOL/L (ref 21–32)
CREAT BLD-MCNC: 0.91 MG/DL
FASTING PATIENT LIPID ANSWER: YES
FASTING STATUS PATIENT QL REPORTED: YES
GFR SERPLBLD BASED ON 1.73 SQ M-ARVRAT: 72 ML/MIN/1.73M2 (ref 60–?)
GLOBULIN PLAS-MCNC: 3.5 G/DL (ref 2.8–4.4)
GLUCOSE BLD-MCNC: 91 MG/DL (ref 70–99)
HDLC SERPL-MCNC: 57 MG/DL (ref 40–59)
LDLC SERPL CALC-MCNC: 118 MG/DL (ref ?–100)
NONHDLC SERPL-MCNC: 143 MG/DL (ref ?–130)
OSMOLALITY SERPL CALC.SUM OF ELEC: 293 MOSM/KG (ref 275–295)
POTASSIUM SERPL-SCNC: 3.9 MMOL/L (ref 3.5–5.1)
PROT SERPL-MCNC: 7.6 G/DL (ref 6.4–8.2)
SODIUM SERPL-SCNC: 142 MMOL/L (ref 136–145)
TRIGL SERPL-MCNC: 140 MG/DL (ref 30–149)
VLDLC SERPL CALC-MCNC: 24 MG/DL (ref 0–30)

## 2023-03-15 PROCEDURE — 80061 LIPID PANEL: CPT | Performed by: FAMILY MEDICINE

## 2023-03-15 PROCEDURE — 80053 COMPREHEN METABOLIC PANEL: CPT | Performed by: FAMILY MEDICINE

## 2023-03-17 ENCOUNTER — OFFICE VISIT (OUTPATIENT)
Dept: FAMILY MEDICINE CLINIC | Facility: CLINIC | Age: 63
End: 2023-03-17
Payer: COMMERCIAL

## 2023-03-17 VITALS
BODY MASS INDEX: 24.91 KG/M2 | SYSTOLIC BLOOD PRESSURE: 104 MMHG | OXYGEN SATURATION: 100 % | WEIGHT: 174 LBS | DIASTOLIC BLOOD PRESSURE: 72 MMHG | HEART RATE: 88 BPM | RESPIRATION RATE: 18 BRPM | HEIGHT: 70 IN

## 2023-03-17 DIAGNOSIS — G43.009 MIGRAINE WITHOUT AURA AND WITHOUT STATUS MIGRAINOSUS, NOT INTRACTABLE: ICD-10-CM

## 2023-03-17 DIAGNOSIS — R93.1 ABNORMAL CT SCAN OF HEART: ICD-10-CM

## 2023-03-17 DIAGNOSIS — E55.9 VITAMIN D DEFICIENCY: ICD-10-CM

## 2023-03-17 DIAGNOSIS — E04.2 MULTIPLE THYROID NODULES: ICD-10-CM

## 2023-03-17 DIAGNOSIS — Z23 NEED FOR VACCINATION: ICD-10-CM

## 2023-03-17 DIAGNOSIS — I25.10 CORONARY ARTERY DISEASE INVOLVING NATIVE CORONARY ARTERY OF NATIVE HEART WITHOUT ANGINA PECTORIS: ICD-10-CM

## 2023-03-17 DIAGNOSIS — I65.23 BILATERAL CAROTID ARTERY STENOSIS: ICD-10-CM

## 2023-03-17 DIAGNOSIS — L30.9 ECZEMA, UNSPECIFIED TYPE: ICD-10-CM

## 2023-03-17 DIAGNOSIS — M85.89 OSTEOPENIA OF MULTIPLE SITES: ICD-10-CM

## 2023-03-17 DIAGNOSIS — M51.36 DDD (DEGENERATIVE DISC DISEASE), LUMBAR: ICD-10-CM

## 2023-03-17 DIAGNOSIS — M54.32 LEFT SIDED SCIATICA: ICD-10-CM

## 2023-03-17 DIAGNOSIS — E78.5 DYSLIPIDEMIA: ICD-10-CM

## 2023-03-17 DIAGNOSIS — L71.9 ROSACEA: ICD-10-CM

## 2023-03-17 DIAGNOSIS — I10 ESSENTIAL HYPERTENSION, BENIGN: Primary | ICD-10-CM

## 2023-03-17 DIAGNOSIS — Z00.00 LABORATORY EXAMINATION ORDERED AS PART OF A ROUTINE GENERAL MEDICAL EXAMINATION: ICD-10-CM

## 2023-03-17 DIAGNOSIS — K21.9 GASTROESOPHAGEAL REFLUX DISEASE, UNSPECIFIED WHETHER ESOPHAGITIS PRESENT: ICD-10-CM

## 2023-03-17 DIAGNOSIS — Z13.89 SCREENING FOR GENITOURINARY CONDITION: ICD-10-CM

## 2023-03-17 DIAGNOSIS — R91.1 LUNG NODULE: ICD-10-CM

## 2023-03-17 DIAGNOSIS — Z79.899 MEDICATION MANAGEMENT: ICD-10-CM

## 2023-03-17 PROCEDURE — 3074F SYST BP LT 130 MM HG: CPT | Performed by: FAMILY MEDICINE

## 2023-03-17 PROCEDURE — 90471 IMMUNIZATION ADMIN: CPT | Performed by: FAMILY MEDICINE

## 2023-03-17 PROCEDURE — 3078F DIAST BP <80 MM HG: CPT | Performed by: FAMILY MEDICINE

## 2023-03-17 PROCEDURE — 3008F BODY MASS INDEX DOCD: CPT | Performed by: FAMILY MEDICINE

## 2023-03-17 PROCEDURE — 90715 TDAP VACCINE 7 YRS/> IM: CPT | Performed by: FAMILY MEDICINE

## 2023-03-17 PROCEDURE — 99215 OFFICE O/P EST HI 40 MIN: CPT | Performed by: FAMILY MEDICINE

## 2023-03-17 RX ORDER — GABAPENTIN 100 MG/1
100 CAPSULE ORAL NIGHTLY
Qty: 90 CAPSULE | Refills: 0 | Status: SHIPPED | OUTPATIENT
Start: 2023-03-17

## 2023-03-17 RX ORDER — FLUTICASONE PROPIONATE 50 MCG
2 SPRAY, SUSPENSION (ML) NASAL DAILY
Refills: 0 | Status: CANCELLED | OUTPATIENT
Start: 2023-03-17 | End: 2024-03-11

## 2023-03-17 RX ORDER — GABAPENTIN 300 MG/1
300 CAPSULE ORAL NIGHTLY
Qty: 90 CAPSULE | Refills: 0 | Status: SHIPPED | OUTPATIENT
Start: 2023-03-17

## 2023-03-20 ENCOUNTER — PATIENT MESSAGE (OUTPATIENT)
Dept: FAMILY MEDICINE CLINIC | Facility: CLINIC | Age: 63
End: 2023-03-20

## 2023-03-20 NOTE — TELEPHONE ENCOUNTER
From: Lauren David  To: Bladimir Hudson DO  Sent: 3/20/2023 9:47 AM CDT  Subject: Atorvastatin    Hi,  At my appointment Friday, we talked about changing my statin from 20mg to 40mg. I was able to  the new Gabapentin perscription, but there wasn't one for the 40mg Atorvastatin. I recently filled a 90 day supply of 20mg. Would it work to just take 2 of those until they are gone? I think I have enough to get me through until May.   Thanks,  Marathon Oil

## 2023-04-08 DIAGNOSIS — I10 ESSENTIAL HYPERTENSION, BENIGN: ICD-10-CM

## 2023-04-10 RX ORDER — LISINOPRIL 5 MG/1
TABLET ORAL
Qty: 90 TABLET | Refills: 1 | Status: SHIPPED | OUTPATIENT
Start: 2023-04-10

## 2023-06-02 RX ORDER — GABAPENTIN 100 MG/1
CAPSULE ORAL
Qty: 90 CAPSULE | Refills: 0 | Status: SHIPPED | OUTPATIENT
Start: 2023-06-02

## 2023-06-06 DIAGNOSIS — E78.5 DYSLIPIDEMIA: ICD-10-CM

## 2023-06-06 RX ORDER — ATORVASTATIN CALCIUM 20 MG/1
TABLET, FILM COATED ORAL
Qty: 90 TABLET | Refills: 1 | Status: SHIPPED | OUTPATIENT
Start: 2023-06-06

## 2023-07-03 ENCOUNTER — OFFICE VISIT (OUTPATIENT)
Dept: FAMILY MEDICINE CLINIC | Facility: CLINIC | Age: 63
End: 2023-07-03
Payer: COMMERCIAL

## 2023-07-03 ENCOUNTER — PATIENT MESSAGE (OUTPATIENT)
Dept: FAMILY MEDICINE CLINIC | Facility: CLINIC | Age: 63
End: 2023-07-03

## 2023-07-03 VITALS
BODY MASS INDEX: 25.77 KG/M2 | SYSTOLIC BLOOD PRESSURE: 122 MMHG | WEIGHT: 180 LBS | RESPIRATION RATE: 18 BRPM | OXYGEN SATURATION: 100 % | HEIGHT: 70 IN | HEART RATE: 89 BPM | DIASTOLIC BLOOD PRESSURE: 70 MMHG

## 2023-07-03 DIAGNOSIS — R30.0 BURNING WITH URINATION: ICD-10-CM

## 2023-07-03 DIAGNOSIS — R30.0 BURNING WITH URINATION: Primary | ICD-10-CM

## 2023-07-03 LAB
BILIRUBIN: NEGATIVE
GLUCOSE (URINE DIPSTICK): NEGATIVE MG/DL
KETONES (URINE DIPSTICK): NEGATIVE MG/DL
MULTISTIX LOT#: ABNORMAL NUMERIC
NITRITE, URINE: NEGATIVE
PH, URINE: 5.5 (ref 4.5–8)
PROTEIN (URINE DIPSTICK): NEGATIVE MG/DL
SPECIFIC GRAVITY: 1.01 (ref 1–1.03)
URINE-COLOR: YELLOW
UROBILINOGEN,SEMI-QN: 0.2 MG/DL (ref 0–1.9)

## 2023-07-03 PROCEDURE — 87086 URINE CULTURE/COLONY COUNT: CPT | Performed by: FAMILY MEDICINE

## 2023-07-03 RX ORDER — CIPROFLOXACIN 250 MG/1
250 TABLET, FILM COATED ORAL 2 TIMES DAILY
Qty: 10 TABLET | Refills: 0 | Status: SHIPPED | OUTPATIENT
Start: 2023-07-03 | End: 2023-07-03

## 2023-07-03 RX ORDER — CIPROFLOXACIN 250 MG/1
250 TABLET, FILM COATED ORAL 2 TIMES DAILY
Qty: 10 TABLET | Refills: 0 | Status: SHIPPED | OUTPATIENT
Start: 2023-07-03 | End: 2023-07-08

## 2023-07-06 DIAGNOSIS — N30.01 ACUTE CYSTITIS WITH HEMATURIA: Primary | ICD-10-CM

## 2023-07-10 RX ORDER — GABAPENTIN 300 MG/1
CAPSULE ORAL
Qty: 90 CAPSULE | Refills: 1 | Status: SHIPPED | OUTPATIENT
Start: 2023-07-10

## 2023-07-24 ENCOUNTER — LAB ENCOUNTER (OUTPATIENT)
Dept: LAB | Facility: REFERENCE LAB | Age: 63
End: 2023-07-24
Attending: FAMILY MEDICINE
Payer: COMMERCIAL

## 2023-07-24 DIAGNOSIS — E78.5 DYSLIPIDEMIA: ICD-10-CM

## 2023-07-24 DIAGNOSIS — I10 ESSENTIAL HYPERTENSION, BENIGN: ICD-10-CM

## 2023-07-24 LAB
ALBUMIN SERPL-MCNC: 4 G/DL (ref 3.4–5)
ALBUMIN/GLOB SERPL: 1.1 {RATIO} (ref 1–2)
ALP LIVER SERPL-CCNC: 50 U/L
ALT SERPL-CCNC: 34 U/L
ANION GAP SERPL CALC-SCNC: 6 MMOL/L (ref 0–18)
AST SERPL-CCNC: 30 U/L (ref 15–37)
BILIRUB SERPL-MCNC: 0.4 MG/DL (ref 0.1–2)
BUN BLD-MCNC: 12 MG/DL (ref 7–18)
BUN/CREAT SERPL: 11.9 (ref 10–20)
CALCIUM BLD-MCNC: 9.3 MG/DL (ref 8.5–10.1)
CHLORIDE SERPL-SCNC: 108 MMOL/L (ref 98–112)
CHOLEST SERPL-MCNC: 224 MG/DL (ref ?–200)
CO2 SERPL-SCNC: 28 MMOL/L (ref 21–32)
CREAT BLD-MCNC: 1.01 MG/DL
EGFRCR SERPLBLD CKD-EPI 2021: 63 ML/MIN/1.73M2 (ref 60–?)
FASTING PATIENT LIPID ANSWER: YES
FASTING STATUS PATIENT QL REPORTED: YES
GLOBULIN PLAS-MCNC: 3.8 G/DL (ref 2.8–4.4)
GLUCOSE BLD-MCNC: 94 MG/DL (ref 70–99)
HDLC SERPL-MCNC: 83 MG/DL (ref 40–59)
LDLC SERPL CALC-MCNC: 123 MG/DL (ref ?–100)
NONHDLC SERPL-MCNC: 141 MG/DL (ref ?–130)
OSMOLALITY SERPL CALC.SUM OF ELEC: 294 MOSM/KG (ref 275–295)
POTASSIUM SERPL-SCNC: 4.6 MMOL/L (ref 3.5–5.1)
PROT SERPL-MCNC: 7.8 G/DL (ref 6.4–8.2)
SODIUM SERPL-SCNC: 142 MMOL/L (ref 136–145)
TRIGL SERPL-MCNC: 104 MG/DL (ref 30–149)
VLDLC SERPL CALC-MCNC: 18 MG/DL (ref 0–30)

## 2023-07-24 PROCEDURE — 80053 COMPREHEN METABOLIC PANEL: CPT | Performed by: FAMILY MEDICINE

## 2023-07-24 PROCEDURE — 80061 LIPID PANEL: CPT | Performed by: FAMILY MEDICINE

## 2023-07-26 ENCOUNTER — OFFICE VISIT (OUTPATIENT)
Dept: FAMILY MEDICINE CLINIC | Facility: CLINIC | Age: 63
End: 2023-07-26
Payer: COMMERCIAL

## 2023-07-26 VITALS
WEIGHT: 181 LBS | RESPIRATION RATE: 18 BRPM | DIASTOLIC BLOOD PRESSURE: 70 MMHG | OXYGEN SATURATION: 98 % | SYSTOLIC BLOOD PRESSURE: 122 MMHG | BODY MASS INDEX: 25.91 KG/M2 | HEIGHT: 70 IN | HEART RATE: 72 BPM

## 2023-07-26 DIAGNOSIS — E04.2 MULTIPLE THYROID NODULES: ICD-10-CM

## 2023-07-26 DIAGNOSIS — I25.10 CORONARY ARTERY DISEASE INVOLVING NATIVE CORONARY ARTERY OF NATIVE HEART WITHOUT ANGINA PECTORIS: ICD-10-CM

## 2023-07-26 DIAGNOSIS — G43.009 MIGRAINE WITHOUT AURA AND WITHOUT STATUS MIGRAINOSUS, NOT INTRACTABLE: ICD-10-CM

## 2023-07-26 DIAGNOSIS — I10 ESSENTIAL HYPERTENSION, BENIGN: ICD-10-CM

## 2023-07-26 DIAGNOSIS — M51.36 DDD (DEGENERATIVE DISC DISEASE), LUMBAR: ICD-10-CM

## 2023-07-26 DIAGNOSIS — E78.5 DYSLIPIDEMIA: Primary | ICD-10-CM

## 2023-07-26 DIAGNOSIS — M85.89 OSTEOPENIA OF MULTIPLE SITES: ICD-10-CM

## 2023-07-26 DIAGNOSIS — Z79.899 MEDICATION MANAGEMENT: ICD-10-CM

## 2023-07-26 DIAGNOSIS — M43.06 PARS DEFECT OF LUMBAR SPINE: ICD-10-CM

## 2023-07-26 DIAGNOSIS — Z71.85 VACCINE COUNSELING: ICD-10-CM

## 2023-07-26 DIAGNOSIS — I65.23 BILATERAL CAROTID ARTERY STENOSIS: ICD-10-CM

## 2023-07-26 DIAGNOSIS — E55.9 VITAMIN D DEFICIENCY: ICD-10-CM

## 2023-07-26 DIAGNOSIS — M54.16 LUMBAR RADICULOPATHY: ICD-10-CM

## 2023-07-26 DIAGNOSIS — L30.9 ECZEMA, UNSPECIFIED TYPE: ICD-10-CM

## 2023-07-26 DIAGNOSIS — Z13.89 SCREENING FOR GENITOURINARY CONDITION: ICD-10-CM

## 2023-07-26 DIAGNOSIS — L71.9 ROSACEA: ICD-10-CM

## 2023-07-26 DIAGNOSIS — K21.9 GASTROESOPHAGEAL REFLUX DISEASE, UNSPECIFIED WHETHER ESOPHAGITIS PRESENT: ICD-10-CM

## 2023-07-26 DIAGNOSIS — R91.1 LUNG NODULE: ICD-10-CM

## 2023-07-26 DIAGNOSIS — M54.32 LEFT SIDED SCIATICA: ICD-10-CM

## 2023-07-26 LAB
BILIRUB UR QL STRIP.AUTO: NEGATIVE
CLARITY UR REFRACT.AUTO: CLEAR
COLOR UR AUTO: COLORLESS
GLUCOSE UR STRIP.AUTO-MCNC: NEGATIVE MG/DL
KETONES UR STRIP.AUTO-MCNC: NEGATIVE MG/DL
LEUKOCYTE ESTERASE UR QL STRIP.AUTO: NEGATIVE
NITRITE UR QL STRIP.AUTO: NEGATIVE
PH UR STRIP.AUTO: 6 [PH] (ref 5–8)
PROT UR STRIP.AUTO-MCNC: NEGATIVE MG/DL
RBC UR QL AUTO: NEGATIVE
SP GR UR STRIP.AUTO: 1 (ref 1–1.03)
UROBILINOGEN UR STRIP.AUTO-MCNC: <2 MG/DL

## 2023-07-26 PROCEDURE — 99214 OFFICE O/P EST MOD 30 MIN: CPT | Performed by: FAMILY MEDICINE

## 2023-07-26 PROCEDURE — 81003 URINALYSIS AUTO W/O SCOPE: CPT | Performed by: FAMILY MEDICINE

## 2023-07-26 PROCEDURE — 3078F DIAST BP <80 MM HG: CPT | Performed by: FAMILY MEDICINE

## 2023-07-26 PROCEDURE — 3074F SYST BP LT 130 MM HG: CPT | Performed by: FAMILY MEDICINE

## 2023-07-26 PROCEDURE — 3008F BODY MASS INDEX DOCD: CPT | Performed by: FAMILY MEDICINE

## 2023-07-26 RX ORDER — ATORVASTATIN CALCIUM 40 MG/1
40 TABLET, FILM COATED ORAL NIGHTLY
Qty: 90 TABLET | Refills: 1 | Status: SHIPPED | OUTPATIENT
Start: 2023-07-26

## 2023-07-26 RX ORDER — ATORVASTATIN CALCIUM 40 MG/1
40 TABLET, FILM COATED ORAL NIGHTLY
COMMUNITY
End: 2023-07-26

## 2023-08-15 DIAGNOSIS — M85.89 OSTEOPENIA OF MULTIPLE SITES: ICD-10-CM

## 2023-08-15 RX ORDER — ALENDRONATE SODIUM 70 MG/1
70 TABLET ORAL
Qty: 12 TABLET | Refills: 1 | Status: SHIPPED | OUTPATIENT
Start: 2023-08-15

## 2023-08-26 DIAGNOSIS — I10 ESSENTIAL HYPERTENSION, BENIGN: ICD-10-CM

## 2023-08-28 RX ORDER — LISINOPRIL 5 MG/1
TABLET ORAL
Qty: 90 TABLET | Refills: 1 | Status: SHIPPED | OUTPATIENT
Start: 2023-08-28

## 2023-08-28 NOTE — TELEPHONE ENCOUNTER
Last office visit: 7/26/2023   Protocol: pass  Requested medication(s) are due for refill today: yes  Requested medication(s) are on the active medication list same strength, form, dose/ sig: yes  Requested medication(s) are managed by provider: yes  Patient has already received a courtsey refill: no

## 2023-09-14 RX ORDER — GABAPENTIN 100 MG/1
100 CAPSULE ORAL NIGHTLY
Qty: 90 CAPSULE | Refills: 1 | Status: SHIPPED | OUTPATIENT
Start: 2023-09-14 | End: 2023-09-15 | Stop reason: ALTCHOICE

## 2023-09-15 ENCOUNTER — OFFICE VISIT (OUTPATIENT)
Dept: FAMILY MEDICINE CLINIC | Facility: CLINIC | Age: 63
End: 2023-09-15
Payer: COMMERCIAL

## 2023-09-15 VITALS
SYSTOLIC BLOOD PRESSURE: 130 MMHG | OXYGEN SATURATION: 98 % | DIASTOLIC BLOOD PRESSURE: 90 MMHG | BODY MASS INDEX: 25.91 KG/M2 | HEART RATE: 91 BPM | WEIGHT: 181 LBS | RESPIRATION RATE: 18 BRPM | HEIGHT: 70 IN

## 2023-09-15 DIAGNOSIS — R21 RASH DUE TO ALLERGY: ICD-10-CM

## 2023-09-15 DIAGNOSIS — M25.561 ACUTE PAIN OF RIGHT KNEE: Primary | ICD-10-CM

## 2023-09-15 DIAGNOSIS — T78.40XA RASH DUE TO ALLERGY: ICD-10-CM

## 2023-09-15 PROCEDURE — 3075F SYST BP GE 130 - 139MM HG: CPT | Performed by: STUDENT IN AN ORGANIZED HEALTH CARE EDUCATION/TRAINING PROGRAM

## 2023-09-15 PROCEDURE — 3080F DIAST BP >= 90 MM HG: CPT | Performed by: STUDENT IN AN ORGANIZED HEALTH CARE EDUCATION/TRAINING PROGRAM

## 2023-09-15 PROCEDURE — 99213 OFFICE O/P EST LOW 20 MIN: CPT | Performed by: STUDENT IN AN ORGANIZED HEALTH CARE EDUCATION/TRAINING PROGRAM

## 2023-09-15 PROCEDURE — 3008F BODY MASS INDEX DOCD: CPT | Performed by: STUDENT IN AN ORGANIZED HEALTH CARE EDUCATION/TRAINING PROGRAM

## 2023-10-17 ENCOUNTER — OFFICE VISIT (OUTPATIENT)
Dept: FAMILY MEDICINE CLINIC | Facility: CLINIC | Age: 63
End: 2023-10-17
Payer: COMMERCIAL

## 2023-10-17 VITALS
BODY MASS INDEX: 25.91 KG/M2 | HEIGHT: 70 IN | OXYGEN SATURATION: 100 % | SYSTOLIC BLOOD PRESSURE: 148 MMHG | HEART RATE: 79 BPM | DIASTOLIC BLOOD PRESSURE: 88 MMHG | RESPIRATION RATE: 18 BRPM | WEIGHT: 181 LBS

## 2023-10-17 DIAGNOSIS — I25.10 CORONARY ARTERY DISEASE INVOLVING NATIVE CORONARY ARTERY OF NATIVE HEART WITHOUT ANGINA PECTORIS: ICD-10-CM

## 2023-10-17 DIAGNOSIS — I65.23 BILATERAL CAROTID ARTERY STENOSIS: ICD-10-CM

## 2023-10-17 DIAGNOSIS — Z12.31 SCREENING MAMMOGRAM FOR BREAST CANCER: ICD-10-CM

## 2023-10-17 DIAGNOSIS — Z00.00 ROUTINE GENERAL MEDICAL EXAMINATION AT A HEALTH CARE FACILITY: Primary | ICD-10-CM

## 2023-10-17 PROCEDURE — 3079F DIAST BP 80-89 MM HG: CPT | Performed by: FAMILY MEDICINE

## 2023-10-17 PROCEDURE — 3077F SYST BP >= 140 MM HG: CPT | Performed by: FAMILY MEDICINE

## 2023-10-17 PROCEDURE — 3008F BODY MASS INDEX DOCD: CPT | Performed by: FAMILY MEDICINE

## 2023-10-17 PROCEDURE — 99396 PREV VISIT EST AGE 40-64: CPT | Performed by: FAMILY MEDICINE

## 2023-10-17 RX ORDER — LISINOPRIL 10 MG/1
10 TABLET ORAL DAILY
Qty: 90 TABLET | Refills: 1 | Status: SHIPPED | OUTPATIENT
Start: 2023-10-17 | End: 2024-10-11

## 2023-10-21 DIAGNOSIS — E78.5 DYSLIPIDEMIA: Primary | ICD-10-CM

## 2023-10-23 RX ORDER — ATORVASTATIN CALCIUM 40 MG/1
40 TABLET, FILM COATED ORAL NIGHTLY
Qty: 90 TABLET | Refills: 1 | Status: SHIPPED | OUTPATIENT
Start: 2023-10-23

## 2023-10-23 NOTE — TELEPHONE ENCOUNTER
Last office visit: 10/17/2023   Protocol: pass  Requested medication(s) are due for refill today: yes  Requested medication(s) are on the active medication list same strength, form, dose/ sig: yes  Requested medication(s) are managed by provider: yes  Patient has already received a courtsey refill: no

## 2023-10-26 ENCOUNTER — LAB ENCOUNTER (OUTPATIENT)
Dept: LAB | Age: 63
End: 2023-10-26
Attending: INTERNAL MEDICINE

## 2023-10-26 DIAGNOSIS — R19.7 DIARRHEA, UNSPECIFIED TYPE: ICD-10-CM

## 2023-10-26 DIAGNOSIS — I10 ESSENTIAL HYPERTENSION, BENIGN: ICD-10-CM

## 2023-10-26 DIAGNOSIS — Z00.00 LABORATORY EXAMINATION ORDERED AS PART OF A ROUTINE GENERAL MEDICAL EXAMINATION: ICD-10-CM

## 2023-10-26 DIAGNOSIS — E55.9 VITAMIN D DEFICIENCY: ICD-10-CM

## 2023-10-26 DIAGNOSIS — E04.2 MULTIPLE THYROID NODULES: ICD-10-CM

## 2023-10-26 DIAGNOSIS — R14.0 BLOATING: ICD-10-CM

## 2023-10-26 DIAGNOSIS — E78.5 DYSLIPIDEMIA: ICD-10-CM

## 2023-10-26 LAB
ALBUMIN SERPL-MCNC: 4.1 G/DL (ref 3.4–5)
ALBUMIN/GLOB SERPL: 1.1 {RATIO} (ref 1–2)
ALP LIVER SERPL-CCNC: 52 U/L
ALT SERPL-CCNC: 39 U/L
ANION GAP SERPL CALC-SCNC: 8 MMOL/L (ref 0–18)
AST SERPL-CCNC: 38 U/L (ref 15–37)
BASOPHILS # BLD AUTO: 0.04 X10(3) UL (ref 0–0.2)
BASOPHILS NFR BLD AUTO: 0.7 %
BILIRUB SERPL-MCNC: 0.4 MG/DL (ref 0.1–2)
BUN BLD-MCNC: 12 MG/DL (ref 7–18)
BUN/CREAT SERPL: 11.9 (ref 10–20)
CALCIUM BLD-MCNC: 9.6 MG/DL (ref 8.5–10.1)
CHLORIDE SERPL-SCNC: 106 MMOL/L (ref 98–112)
CHOLEST SERPL-MCNC: 215 MG/DL (ref ?–200)
CO2 SERPL-SCNC: 26 MMOL/L (ref 21–32)
CREAT BLD-MCNC: 1.01 MG/DL
DEPRECATED RDW RBC AUTO: 49.1 FL (ref 35.1–46.3)
EGFRCR SERPLBLD CKD-EPI 2021: 63 ML/MIN/1.73M2 (ref 60–?)
EOSINOPHIL # BLD AUTO: 0.2 X10(3) UL (ref 0–0.7)
EOSINOPHIL NFR BLD AUTO: 3.3 %
ERYTHROCYTE [DISTWIDTH] IN BLOOD BY AUTOMATED COUNT: 14.6 % (ref 11–15)
FASTING PATIENT LIPID ANSWER: YES
FASTING STATUS PATIENT QL REPORTED: YES
GLOBULIN PLAS-MCNC: 3.6 G/DL (ref 2.8–4.4)
GLUCOSE BLD-MCNC: 93 MG/DL (ref 70–99)
HCT VFR BLD AUTO: 40.8 %
HDLC SERPL-MCNC: 78 MG/DL (ref 40–59)
HGB BLD-MCNC: 13.8 G/DL
IGA SERPL-MCNC: 130 MG/DL (ref 70–312)
IMM GRANULOCYTES # BLD AUTO: 0.03 X10(3) UL (ref 0–1)
IMM GRANULOCYTES NFR BLD: 0.5 %
LDLC SERPL CALC-MCNC: 109 MG/DL (ref ?–100)
LYMPHOCYTES # BLD AUTO: 1.45 X10(3) UL (ref 1–4)
LYMPHOCYTES NFR BLD AUTO: 24.1 %
MCH RBC QN AUTO: 30.7 PG (ref 26–34)
MCHC RBC AUTO-ENTMCNC: 33.8 G/DL (ref 31–37)
MCV RBC AUTO: 90.7 FL
MONOCYTES # BLD AUTO: 0.45 X10(3) UL (ref 0.1–1)
MONOCYTES NFR BLD AUTO: 7.5 %
NEUTROPHILS # BLD AUTO: 3.84 X10 (3) UL (ref 1.5–7.7)
NEUTROPHILS # BLD AUTO: 3.84 X10(3) UL (ref 1.5–7.7)
NEUTROPHILS NFR BLD AUTO: 63.9 %
NONHDLC SERPL-MCNC: 137 MG/DL (ref ?–130)
OSMOLALITY SERPL CALC.SUM OF ELEC: 289 MOSM/KG (ref 275–295)
PLATELET # BLD AUTO: 338 10(3)UL (ref 150–450)
POTASSIUM SERPL-SCNC: 4.1 MMOL/L (ref 3.5–5.1)
PROT SERPL-MCNC: 7.7 G/DL (ref 6.4–8.2)
RBC # BLD AUTO: 4.5 X10(6)UL
SODIUM SERPL-SCNC: 140 MMOL/L (ref 136–145)
TRIGL SERPL-MCNC: 164 MG/DL (ref 30–149)
TSI SER-ACNC: 2.38 MIU/ML (ref 0.36–3.74)
VIT D+METAB SERPL-MCNC: 56.3 NG/ML (ref 30–100)
VLDLC SERPL CALC-MCNC: 28 MG/DL (ref 0–30)
WBC # BLD AUTO: 6 X10(3) UL (ref 4–11)

## 2023-10-26 PROCEDURE — 80061 LIPID PANEL: CPT | Performed by: FAMILY MEDICINE

## 2023-10-26 PROCEDURE — 80050 GENERAL HEALTH PANEL: CPT | Performed by: FAMILY MEDICINE

## 2023-10-26 PROCEDURE — 82306 VITAMIN D 25 HYDROXY: CPT | Performed by: FAMILY MEDICINE

## 2023-10-27 LAB — TTG IGA SER-ACNC: 0.3 U/ML (ref ?–7)

## 2023-10-27 NOTE — PROGRESS NOTES
Dear Jaziel Woods,    Your fats and lipids are mildly elevated -- focus on diet and exercise. The rest of your blood work is stable.      Sincerely,  Dr. Kerrie Mc

## 2023-10-29 ENCOUNTER — ORDER TRANSCRIPTION (OUTPATIENT)
Dept: ADMINISTRATIVE | Facility: HOSPITAL | Age: 63
End: 2023-10-29

## 2023-10-29 DIAGNOSIS — Z13.6 SCREENING FOR CARDIOVASCULAR CONDITION: Primary | ICD-10-CM

## 2023-11-01 ENCOUNTER — HOSPITAL ENCOUNTER (OUTPATIENT)
Dept: ULTRASOUND IMAGING | Age: 63
Discharge: HOME | End: 2023-11-01
Attending: FAMILY MEDICINE
Payer: COMMERCIAL

## 2023-11-01 ENCOUNTER — OFFICE VISIT (OUTPATIENT)
Dept: FAMILY MEDICINE CLINIC | Facility: CLINIC | Age: 63
End: 2023-11-01
Payer: COMMERCIAL

## 2023-11-01 VITALS
SYSTOLIC BLOOD PRESSURE: 132 MMHG | WEIGHT: 180 LBS | DIASTOLIC BLOOD PRESSURE: 86 MMHG | RESPIRATION RATE: 16 BRPM | BODY MASS INDEX: 26.66 KG/M2 | OXYGEN SATURATION: 98 % | HEART RATE: 86 BPM | HEIGHT: 69 IN

## 2023-11-01 DIAGNOSIS — I65.23 BILATERAL CAROTID ARTERY STENOSIS: ICD-10-CM

## 2023-11-01 DIAGNOSIS — R00.2 PALPITATIONS: Primary | ICD-10-CM

## 2023-11-01 DIAGNOSIS — I49.3 PVC (PREMATURE VENTRICULAR CONTRACTION): ICD-10-CM

## 2023-11-01 LAB
ATRIAL RATE: 75 BPM
P AXIS: 30 DEGREES
P-R INTERVAL: 184 MS
Q-T INTERVAL: 392 MS
QRS DURATION: 78 MS
QTC CALCULATION (BEZET): 437 MS
R AXIS: 49 DEGREES
T AXIS: 56 DEGREES
VENTRICULAR RATE: 75 BPM

## 2023-11-01 PROCEDURE — 93000 ELECTROCARDIOGRAM COMPLETE: CPT | Performed by: FAMILY MEDICINE

## 2023-11-01 PROCEDURE — 3075F SYST BP GE 130 - 139MM HG: CPT | Performed by: FAMILY MEDICINE

## 2023-11-01 PROCEDURE — 99214 OFFICE O/P EST MOD 30 MIN: CPT | Performed by: FAMILY MEDICINE

## 2023-11-01 PROCEDURE — 93880 EXTRACRANIAL BILAT STUDY: CPT | Performed by: FAMILY MEDICINE

## 2023-11-01 PROCEDURE — 3008F BODY MASS INDEX DOCD: CPT | Performed by: FAMILY MEDICINE

## 2023-11-01 PROCEDURE — 3079F DIAST BP 80-89 MM HG: CPT | Performed by: FAMILY MEDICINE

## 2023-11-02 ENCOUNTER — HOSPITAL ENCOUNTER (OUTPATIENT)
Dept: CT IMAGING | Facility: HOSPITAL | Age: 63
Discharge: HOME | End: 2023-11-02
Attending: FAMILY MEDICINE

## 2023-11-02 VITALS
BODY MASS INDEX: 26.66 KG/M2 | HEIGHT: 69 IN | SYSTOLIC BLOOD PRESSURE: 110 MMHG | DIASTOLIC BLOOD PRESSURE: 70 MMHG | WEIGHT: 180 LBS

## 2023-11-02 DIAGNOSIS — Z13.6 SCREENING FOR CARDIOVASCULAR CONDITION: ICD-10-CM

## 2023-11-02 DIAGNOSIS — I77.810 AORTIC ECTASIA, THORACIC (HCC): Primary | ICD-10-CM

## 2023-11-14 ENCOUNTER — HOSPITAL ENCOUNTER (OUTPATIENT)
Dept: CT IMAGING | Facility: HOSPITAL | Age: 63
Discharge: HOME | End: 2023-11-14
Attending: FAMILY MEDICINE
Payer: COMMERCIAL

## 2023-11-14 DIAGNOSIS — I77.810 AORTIC ECTASIA, THORACIC (HCC): ICD-10-CM

## 2023-11-14 PROCEDURE — 71275 CT ANGIOGRAPHY CHEST: CPT | Performed by: FAMILY MEDICINE

## 2023-11-15 DIAGNOSIS — R91.8 OPACITY OF LUNG ON IMAGING STUDY: Primary | ICD-10-CM

## 2023-11-17 ENCOUNTER — HOSPITAL ENCOUNTER (OUTPATIENT)
Dept: CV DIAGNOSTICS | Facility: HOSPITAL | Age: 63
Discharge: HOME | End: 2023-11-17
Attending: FAMILY MEDICINE
Payer: COMMERCIAL

## 2023-11-17 DIAGNOSIS — R00.2 PALPITATIONS: ICD-10-CM

## 2023-11-17 PROCEDURE — 93271 ECG/MONITORING AND ANALYSIS: CPT | Performed by: FAMILY MEDICINE

## 2023-11-17 PROCEDURE — 93270 REMOTE 30 DAY ECG REV/REPORT: CPT | Performed by: FAMILY MEDICINE

## 2023-12-05 ENCOUNTER — PATIENT MESSAGE (OUTPATIENT)
Dept: FAMILY MEDICINE CLINIC | Facility: CLINIC | Age: 63
End: 2023-12-05

## 2023-12-15 ENCOUNTER — TELEPHONE (OUTPATIENT)
Dept: FAMILY MEDICINE CLINIC | Facility: CLINIC | Age: 63
End: 2023-12-15

## 2023-12-15 NOTE — TELEPHONE ENCOUNTER
Last OV 11/1/23  follow up palpitations             Next OV  1/9/24 med/wt check    Saw Mother on Tuesday  Mom was positive for Covid yesterday but has no symptoms, so unknown if Mom had Covid on Tuesday. Pt has an intermittent runny nose for a while. No new symptoms today. Brief headache this morning. Denies fever, headache, cough, congestion, fatigue, GI symptoms. Reviewed current cdc Covid exposure guidelines:   No need to stay home but wear a high quality mask through 12-22-23; test for Covid on or after 12-18-23 if you do not have symptoms, go to [facility] if you develop symptoms this weekend. Options for Covid testing  if you are asymptomatic include  home test kit, test and treat services at [address] [place] [address]. Reviewed where to find test and treat  option on [employer] website. Call office Monday if you develop symptoms or if you need help accessing Covid test.  Pt verbalizes understanding.

## 2023-12-15 NOTE — TELEPHONE ENCOUNTER
Patient was with her mother and her mother cam down with COVID. She is having a runny nose with headaches and would like a RN call her to let her know what the next step is.

## 2024-01-09 ENCOUNTER — OFFICE VISIT (OUTPATIENT)
Dept: FAMILY MEDICINE CLINIC | Facility: CLINIC | Age: 64
End: 2024-01-09
Payer: COMMERCIAL

## 2024-01-09 VITALS
HEART RATE: 94 BPM | TEMPERATURE: 97 F | HEIGHT: 69 IN | DIASTOLIC BLOOD PRESSURE: 74 MMHG | OXYGEN SATURATION: 98 % | RESPIRATION RATE: 16 BRPM | WEIGHT: 182 LBS | SYSTOLIC BLOOD PRESSURE: 126 MMHG | BODY MASS INDEX: 26.96 KG/M2

## 2024-01-09 DIAGNOSIS — E66.3 OVERWEIGHT (BMI 25.0-29.9): ICD-10-CM

## 2024-01-09 DIAGNOSIS — J34.89 NASAL LESION: Primary | ICD-10-CM

## 2024-01-09 DIAGNOSIS — E55.9 VITAMIN D DEFICIENCY: ICD-10-CM

## 2024-01-09 DIAGNOSIS — B35.1 ONYCHOMYCOSIS: ICD-10-CM

## 2024-01-09 DIAGNOSIS — I25.10 CORONARY ARTERY DISEASE INVOLVING NATIVE CORONARY ARTERY OF NATIVE HEART WITHOUT ANGINA PECTORIS: ICD-10-CM

## 2024-01-09 DIAGNOSIS — I49.3 PVC (PREMATURE VENTRICULAR CONTRACTION): ICD-10-CM

## 2024-01-09 DIAGNOSIS — E78.5 DYSLIPIDEMIA: ICD-10-CM

## 2024-01-09 DIAGNOSIS — Z79.899 MEDICATION MANAGEMENT: ICD-10-CM

## 2024-01-09 DIAGNOSIS — R91.8 OPACITY OF LUNG ON IMAGING STUDY: ICD-10-CM

## 2024-01-09 DIAGNOSIS — I77.810 AORTIC ECTASIA, THORACIC (HCC): ICD-10-CM

## 2024-01-09 PROCEDURE — 3074F SYST BP LT 130 MM HG: CPT | Performed by: FAMILY MEDICINE

## 2024-01-09 PROCEDURE — 3078F DIAST BP <80 MM HG: CPT | Performed by: FAMILY MEDICINE

## 2024-01-09 PROCEDURE — 3008F BODY MASS INDEX DOCD: CPT | Performed by: FAMILY MEDICINE

## 2024-01-09 PROCEDURE — 99215 OFFICE O/P EST HI 40 MIN: CPT | Performed by: FAMILY MEDICINE

## 2024-01-09 RX ORDER — LISINOPRIL 10 MG/1
10 TABLET ORAL DAILY
Qty: 90 TABLET | Refills: 1 | Status: SHIPPED | OUTPATIENT
Start: 2024-01-09 | End: 2025-01-03

## 2024-01-09 RX ORDER — ROSUVASTATIN CALCIUM 20 MG/1
20 TABLET, COATED ORAL NIGHTLY
Qty: 90 TABLET | Refills: 1 | Status: SHIPPED | OUTPATIENT
Start: 2024-01-09

## 2024-01-09 NOTE — PROGRESS NOTES
Carlotta Vargas is a 63 year old female.  HPI:   Pt. Is here for a med check.    HTN -- pt is taking lisinopril -- no side effects; does not check BP at home  DyslipidemiaCAD -- pt is taking lipitor -- no side effects; LDL is still high  Migraines -- stable; cpm  Rosacea --  per derm -- Dr. Fink recently passed and will see new partner  Eczema -- stable -- Dr. Fink  Gyne -- Dr. Hess  Last eye exam -- 12/12/23  PT. Complains of a spot that burns in her nose.  Pt. Complains of toe nail fungus.      Meds reviewed.      Current Outpatient Medications   Medication Sig Dispense Refill    rosuvastatin 20 MG Oral Tab Take 1 tablet (20 mg total) by mouth nightly. 90 tablet 1    lisinopril 10 MG Oral Tab Take 1 tablet (10 mg total) by mouth daily. 90 tablet 1    Ciclopirox 8 % External Solution Apply 1 Application topically nightly. 6 mL 5    ALENDRONATE 70 MG Oral Tab TAKE 1 TABLET (70 MG TOTAL) BY MOUTH EVERY 7 DAYS 12 tablet 1    SULFACLEANSE 8/4 8-4 % External Suspension Apply 1 Application. topically daily.      Adapalene 0.3 % External Gel Differen OTC      Vitamin D3 2000 units Oral Cap Take 1 capsule (2,000 Units total) by mouth daily.      Calcium Carbonate 600 MG Oral Tab Take 1 tablet (600 mg total) by mouth as needed with dialysis for Heartburn.      Multiple Vitamins-Minerals (ONE-A-DAY WOMENS 50 PLUS OR) Take  by mouth.      ELIDEL 1 % External Cream  (Patient not taking: Reported on 1/9/2024)  2      No Known Allergies   Past Medical History:   Diagnosis Date    Abdominal hernia     Constipation     occasionally    Diarrhea, unspecified     occasionally    Flatulence/gas pain/belching     It seems to be worse lately.    Heartburn     Hemorrhoids     High cholesterol     Hypercholesterolemia     Indigestion     Irregular bowel habits     Nausea 07/2022    Night sweats     Other psoriasis     Painful swallowing 06/2022    Problems with swallowing     Rosacea     Skin blushing/flushing     Unspecified  essential hypertension     Vomiting 07/2022    not stomach contents    Wears glasses       Social History:  Social History     Socioeconomic History    Marital status:    Tobacco Use    Smoking status: Never    Smokeless tobacco: Never   Vaping Use    Vaping Use: Never used   Substance and Sexual Activity    Alcohol use: Yes     Alcohol/week: 3.0 standard drinks of alcohol    Drug use: No    Sexual activity: Yes     Partners: Male     Comment: 9/16/2021   Other Topics Concern    Caffeine Concern Yes     Comment: 2 a day    Exercise Yes     Comment: walks        Results for orders placed or performed in visit on 11/01/23   EKG with interpretation and Report -IN OFFICE [89791]   Result Value Ref Range    Ventricular rate 75 BPM    Atrial rate 75 BPM    P-R Interval 184 ms    QRS Duration 78 ms    Q-T Interval 392 ms    QTC Calculation (Bezet) 437 ms    P Axis 30 degrees    R Axis 49 degrees    T Axis 56 degrees       REVIEW OF SYSTEMS:   GENERAL: feels well otherwise  SKIN: eczema and rosacea; eyebrow thinning; irritation by eyes and left hand and bump on left neck  EYES:denies blurred vision or double vision  HEENT:  nasal congestion, sinus pain or ST  LUNGS: denies shortness of breath with exertion  CARDIOVASCULAR: denies chest pain on exertion  GI: denies abdominal pain,denies heartburn  : denies dysuria  MUSCULOSKELETAL: no back pain  NEURO: denies headaches  PSYCHE: denies depression or anxiety  HEMATOLOGIC:  hx of anemia  ENDOCRINE: denies thyroid history  ALL/ASTHMA: denies hx of allergy or asthma    EXAM:   /74 (BP Location: Left arm, Patient Position: Sitting, Cuff Size: adult)   Pulse 94   Temp 97.2 °F (36.2 °C) (Temporal)   Resp 16   Ht 5' 9\" (1.753 m)   Wt 182 lb (82.6 kg)   SpO2 98%   BMI 26.88 kg/m²   GENERAL: well developed, well nourished,in no apparent distress  PSYCHE: normal mood and affect  SKIN: no rashes,no suspicious lesions; scab noted on medial right nare; onychomycosis   toes 3 and 4 right  NECK: supple,no adenopathy,no bruits, no thyromegaly or nodule.  LUNGS: clear to auscultation  CARDIO: RRR without murmur  GI: good BS's,no masses, HSM or tenderness  EXTREMITIES: no cyanosis, clubbing or edema    ASSESSMENT AND PLAN:     Encounter Diagnoses   Name Primary?    Nasal lesion Yes    Onychomycosis     Vitamin D deficiency     Dyslipidemia     Coronary artery disease involving native coronary artery of native heart without angina pectoris     Medication management     PVC (premature ventricular contraction)     Overweight (BMI 25.0-29.9)     Aortic ectasia, thoracic (HCC)     Opacity of lung on imaging study        Orders Placed This Encounter   Procedures    Comp Metabolic Panel (14)    Lipid Panel       Meds & Refills for this Visit:  Requested Prescriptions     Signed Prescriptions Disp Refills    rosuvastatin 20 MG Oral Tab 90 tablet 1     Sig: Take 1 tablet (20 mg total) by mouth nightly.    lisinopril 10 MG Oral Tab 90 tablet 1     Sig: Take 1 tablet (10 mg total) by mouth daily.    Ciclopirox 8 % External Solution 6 mL 5     Sig: Apply 1 Application topically nightly.       Imaging & Consults:  ENT - INTERNAL     Nasal lesion -- advised neosporin and make sure it heals or may need to see ENT for r/o  cancer  HTN -- pt is taking lisinopril 10 mg daily   DyslipidemiaCAD -- pt is taking lipitor - change to rosuvastatin 20 mg nightly and repeat labs in 3 months t osee if will get better LDL control  Migraines -- doing well  Rosacea -- stable per derm  Eczema -- stable  Osteopenia -- stable on fosamax; discussed weight bearing exercises; dexa UTD  GERD -- off pantoprazole  Lung opacity -- CT chest will be repeat in 6 months  Thyroid nodules -- per Dr. Cortez  Vaccine counseling -- stable; CPM  Overweight -- continue to focus on diet and exercise  Onychomycosis -- given ciclopirox  Aortic ectasia -- monitor 3.7 cm -- less than 4 cm is stable    Mammo per gyne - Dr. Hess; UTD  Dexa is  UTD.  Colonoscopy due in 2031.  -- Dr. DURANT  Sees derm and gyne.  Labs ordered for 3 months.    Length of visit/charting -- greater than 40 minutes      The patient indicates understanding of these issues and agrees to the plan.  The patient is asked to return in Return in about 3 months (around 4/9/2024) for med check, weight check.

## 2024-02-24 DIAGNOSIS — M85.89 OSTEOPENIA OF MULTIPLE SITES: ICD-10-CM

## 2024-02-26 RX ORDER — ALENDRONATE SODIUM 70 MG/1
70 TABLET ORAL
Qty: 12 TABLET | Refills: 1 | Status: SHIPPED | OUTPATIENT
Start: 2024-02-26

## 2024-02-26 NOTE — TELEPHONE ENCOUNTER
Last office visit: 1/9/2024   Protocol: pass  Requested medication(s) are due for refill today: yes  Requested medication(s) are on the active medication list same strength, form, dose/ sig: yes  Requested medication(s) are managed by provider: yes  Patient has already received a courtsey refill: no     NOV: 4/10/2024

## 2024-03-12 ENCOUNTER — HOSPITAL ENCOUNTER (OUTPATIENT)
Dept: ULTRASOUND IMAGING | Age: 64
Discharge: HOME | End: 2024-03-12
Attending: OTOLARYNGOLOGY
Payer: COMMERCIAL

## 2024-03-12 DIAGNOSIS — E04.2 MULTIPLE THYROID NODULES: ICD-10-CM

## 2024-03-12 PROCEDURE — 76536 US EXAM OF HEAD AND NECK: CPT | Performed by: OTOLARYNGOLOGY

## 2024-04-05 ENCOUNTER — LAB ENCOUNTER (OUTPATIENT)
Dept: LAB | Age: 64
End: 2024-04-05
Attending: FAMILY MEDICINE
Payer: COMMERCIAL

## 2024-04-05 DIAGNOSIS — I25.10 CORONARY ARTERY DISEASE INVOLVING NATIVE CORONARY ARTERY OF NATIVE HEART WITHOUT ANGINA PECTORIS: ICD-10-CM

## 2024-04-05 DIAGNOSIS — Z79.899 MEDICATION MANAGEMENT: ICD-10-CM

## 2024-04-05 DIAGNOSIS — E78.5 DYSLIPIDEMIA: ICD-10-CM

## 2024-04-05 LAB
ALBUMIN SERPL-MCNC: 4.2 G/DL (ref 3.4–5)
ALBUMIN/GLOB SERPL: 1.2 {RATIO} (ref 1–2)
ALP LIVER SERPL-CCNC: 52 U/L
ALT SERPL-CCNC: 32 U/L
ANION GAP SERPL CALC-SCNC: 2 MMOL/L (ref 0–18)
AST SERPL-CCNC: 23 U/L (ref 15–37)
BILIRUB SERPL-MCNC: 0.5 MG/DL (ref 0.1–2)
BUN BLD-MCNC: 12 MG/DL (ref 9–23)
CALCIUM BLD-MCNC: 9.3 MG/DL (ref 8.5–10.1)
CHLORIDE SERPL-SCNC: 110 MMOL/L (ref 98–112)
CHOLEST SERPL-MCNC: 244 MG/DL (ref ?–200)
CO2 SERPL-SCNC: 29 MMOL/L (ref 21–32)
CREAT BLD-MCNC: 1.24 MG/DL
EGFRCR SERPLBLD CKD-EPI 2021: 50 ML/MIN/1.73M2 (ref 60–?)
FASTING PATIENT LIPID ANSWER: YES
FASTING STATUS PATIENT QL REPORTED: YES
GLOBULIN PLAS-MCNC: 3.5 G/DL (ref 2.8–4.4)
GLUCOSE BLD-MCNC: 84 MG/DL (ref 70–99)
HDLC SERPL-MCNC: 78 MG/DL (ref 40–59)
LDLC SERPL CALC-MCNC: 134 MG/DL (ref ?–100)
NONHDLC SERPL-MCNC: 166 MG/DL (ref ?–130)
OSMOLALITY SERPL CALC.SUM OF ELEC: 291 MOSM/KG (ref 275–295)
POTASSIUM SERPL-SCNC: 4.3 MMOL/L (ref 3.5–5.1)
PROT SERPL-MCNC: 7.7 G/DL (ref 6.4–8.2)
SODIUM SERPL-SCNC: 141 MMOL/L (ref 136–145)
TRIGL SERPL-MCNC: 186 MG/DL (ref 30–149)
VLDLC SERPL CALC-MCNC: 34 MG/DL (ref 0–30)

## 2024-04-05 PROCEDURE — 80053 COMPREHEN METABOLIC PANEL: CPT

## 2024-04-05 PROCEDURE — 80061 LIPID PANEL: CPT

## 2024-04-10 ENCOUNTER — OFFICE VISIT (OUTPATIENT)
Dept: FAMILY MEDICINE CLINIC | Facility: CLINIC | Age: 64
End: 2024-04-10
Payer: COMMERCIAL

## 2024-04-10 VITALS
BODY MASS INDEX: 26.57 KG/M2 | DIASTOLIC BLOOD PRESSURE: 72 MMHG | OXYGEN SATURATION: 100 % | HEART RATE: 79 BPM | RESPIRATION RATE: 16 BRPM | SYSTOLIC BLOOD PRESSURE: 116 MMHG | HEIGHT: 69 IN | WEIGHT: 179.38 LBS

## 2024-04-10 DIAGNOSIS — N18.31 STAGE 3A CHRONIC KIDNEY DISEASE (HCC): ICD-10-CM

## 2024-04-10 DIAGNOSIS — Z84.1 FAMILY HISTORY OF KIDNEY DISEASE: ICD-10-CM

## 2024-04-10 DIAGNOSIS — I77.810 AORTIC ECTASIA, THORACIC (HCC): ICD-10-CM

## 2024-04-10 DIAGNOSIS — L71.9 ROSACEA: ICD-10-CM

## 2024-04-10 DIAGNOSIS — Z79.899 MEDICATION MANAGEMENT: ICD-10-CM

## 2024-04-10 DIAGNOSIS — B35.1 ONYCHOMYCOSIS: ICD-10-CM

## 2024-04-10 DIAGNOSIS — E66.3 OVERWEIGHT (BMI 25.0-29.9): ICD-10-CM

## 2024-04-10 DIAGNOSIS — R91.8 OPACITY OF LUNG ON IMAGING STUDY: ICD-10-CM

## 2024-04-10 DIAGNOSIS — I65.23 BILATERAL CAROTID ARTERY STENOSIS: ICD-10-CM

## 2024-04-10 DIAGNOSIS — M51.36 DDD (DEGENERATIVE DISC DISEASE), LUMBAR: ICD-10-CM

## 2024-04-10 DIAGNOSIS — I25.10 CORONARY ARTERY DISEASE INVOLVING NATIVE CORONARY ARTERY OF NATIVE HEART WITHOUT ANGINA PECTORIS: ICD-10-CM

## 2024-04-10 DIAGNOSIS — I49.3 PVC (PREMATURE VENTRICULAR CONTRACTION): ICD-10-CM

## 2024-04-10 DIAGNOSIS — Z86.010 HISTORY OF ADENOMATOUS POLYP OF COLON: ICD-10-CM

## 2024-04-10 DIAGNOSIS — E04.2 MULTIPLE THYROID NODULES: ICD-10-CM

## 2024-04-10 DIAGNOSIS — E55.9 VITAMIN D DEFICIENCY: ICD-10-CM

## 2024-04-10 DIAGNOSIS — I10 ESSENTIAL HYPERTENSION, BENIGN: ICD-10-CM

## 2024-04-10 DIAGNOSIS — G43.009 MIGRAINE WITHOUT AURA AND WITHOUT STATUS MIGRAINOSUS, NOT INTRACTABLE: ICD-10-CM

## 2024-04-10 DIAGNOSIS — I05.9 MITRAL VALVE DISORDER: ICD-10-CM

## 2024-04-10 DIAGNOSIS — Z71.85 VACCINE COUNSELING: ICD-10-CM

## 2024-04-10 DIAGNOSIS — E78.5 DYSLIPIDEMIA: Primary | ICD-10-CM

## 2024-04-10 DIAGNOSIS — K21.9 GASTROESOPHAGEAL REFLUX DISEASE, UNSPECIFIED WHETHER ESOPHAGITIS PRESENT: ICD-10-CM

## 2024-04-10 DIAGNOSIS — M85.89 OSTEOPENIA OF MULTIPLE SITES: ICD-10-CM

## 2024-04-10 PROCEDURE — 99214 OFFICE O/P EST MOD 30 MIN: CPT | Performed by: FAMILY MEDICINE

## 2024-04-10 NOTE — PROGRESS NOTES
Carlotta Vargas is a 63 year old female.  HPI:   Pt. Is here for a med check.    HTN -- pt is taking lisinopril -- no side effects; does not check BP at home  DyslipidemiaCAD -- pt is taking crestor-- no side effects; LDL is still high  Migraines -- stable; cpm  Rosacea --  per derm  Thyroid nodules -- rajiv see Dr. Cortez about them   Eczema -- stable -- per derm  Gyne -- Dr. Hess  Last eye exam -- 12/12/23  Mother and father with hx of kidney disease.      Meds reviewed.      Current Outpatient Medications   Medication Sig Dispense Refill    alendronate 70 MG Oral Tab Take 1 tablet (70 mg total) by mouth every 7 days. 12 tablet 1    pantoprazole 20 MG Oral Tab EC Take 1 tablet (20 mg total) by mouth Before Dinner. 90 tablet 3    rosuvastatin 20 MG Oral Tab Take 1 tablet (20 mg total) by mouth nightly. 90 tablet 1    lisinopril 10 MG Oral Tab Take 1 tablet (10 mg total) by mouth daily. 90 tablet 1    Ciclopirox 8 % External Solution Apply 1 Application topically nightly. 6 mL 5    SULFACLEANSE 8/4 8-4 % External Suspension Apply 1 Application. topically daily.      Adapalene 0.3 % External Gel Differen OTC      Vitamin D3 2000 units Oral Cap Take 1 capsule (2,000 Units total) by mouth daily.      Calcium Carbonate 600 MG Oral Tab Take 1 tablet (600 mg total) by mouth as needed with dialysis for Heartburn.      Multiple Vitamins-Minerals (ONE-A-DAY WOMENS 50 PLUS OR) Take  by mouth.      ELIDEL 1 % External Cream  (Patient not taking: Reported on 1/9/2024)  2      No Known Allergies   Past Medical History:    Abdominal hernia    Constipation    occasionally    Diarrhea, unspecified    occasionally    Flatulence/gas pain/belching    It seems to be worse lately.    Heartburn    Hemorrhoids    High cholesterol    Hypercholesterolemia    Indigestion    Irregular bowel habits    Nausea    Night sweats    Other psoriasis    Painful swallowing    Problems with swallowing    Rosacea    Skin blushing/flushing     Unspecified essential hypertension    Vomiting    not stomach contents    Wears glasses      Social History:  Social History     Socioeconomic History    Marital status:    Tobacco Use    Smoking status: Never    Smokeless tobacco: Never   Vaping Use    Vaping status: Never Used   Substance and Sexual Activity    Alcohol use: Yes     Alcohol/week: 3.0 standard drinks of alcohol    Drug use: No    Sexual activity: Yes     Partners: Male     Comment: 9/16/2021   Other Topics Concern    Caffeine Concern Yes     Comment: 2 a day    Exercise Yes     Comment: walks        Results for orders placed or performed in visit on 04/05/24   Comp Metabolic Panel (14)   Result Value Ref Range    Glucose 84 70 - 99 mg/dL    Sodium 141 136 - 145 mmol/L    Potassium 4.3 3.5 - 5.1 mmol/L    Chloride 110 98 - 112 mmol/L    CO2 29.0 21.0 - 32.0 mmol/L    Anion Gap 2 0 - 18 mmol/L    BUN 12 9 - 23 mg/dL    Creatinine 1.24 (H) 0.55 - 1.02 mg/dL    Calcium, Total 9.3 8.5 - 10.1 mg/dL    Calculated Osmolality 291 275 - 295 mOsm/kg    eGFR-Cr 50 (L) >=60 mL/min/1.73m2    AST 23 15 - 37 U/L    ALT 32 13 - 56 U/L    Alkaline Phosphatase 52 50 - 130 U/L    Bilirubin, Total 0.5 0.1 - 2.0 mg/dL    Total Protein 7.7 6.4 - 8.2 g/dL    Albumin 4.2 3.4 - 5.0 g/dL    Globulin  3.5 2.8 - 4.4 g/dL    A/G Ratio 1.2 1.0 - 2.0    Patient Fasting for CMP? Yes    Lipid Panel   Result Value Ref Range    Cholesterol, Total 244 (H) <200 mg/dL    HDL Cholesterol 78 (H) 40 - 59 mg/dL    Triglycerides 186 (H) 30 - 149 mg/dL    LDL Cholesterol 134 (H) <100 mg/dL    VLDL 34 (H) 0 - 30 mg/dL    Non HDL Chol 166 (H) <130 mg/dL    Patient Fasting for Lipid? Yes        REVIEW OF SYSTEMS:   GENERAL: feels well otherwise  SKIN: eczema and rosacea; eyebrow thinning  EYES:denies blurred vision or double vision  HEENT:  nasal congestion, sinus pain or ST  LUNGS: denies shortness of breath with exertion  CARDIOVASCULAR: denies chest pain on exertion  GI: denies  abdominal pain,denies heartburn  : denies dysuria  MUSCULOSKELETAL: no back pain  NEURO: denies headaches  PSYCHE: denies depression or anxiety  HEMATOLOGIC:  hx of anemia  ENDOCRINE: denies thyroid history  ALL/ASTHMA: denies hx of allergy or asthma    EXAM:   /72 (BP Location: Left arm, Patient Position: Sitting, Cuff Size: adult)   Pulse 79   Resp 16   Ht 5' 9\" (1.753 m)   Wt 179 lb 6 oz (81.4 kg)   SpO2 100%   BMI 26.49 kg/m²   GENERAL: well developed, well nourished,in no apparent distress  PSYCHE: normal mood and affect  SKIN: no rashes,no suspicious lesions; onychomycosis digits  3 and 4 right  NECK: supple,no adenopathy,no bruits, thyroid nodules  LUNGS: clear to auscultation  CARDIO: RRR without murmur  GI: good BS's,no masses, HSM or tenderness  EXTREMITIES: no cyanosis, clubbing or edema    ASSESSMENT AND PLAN:     Encounter Diagnoses   Name Primary?    Dyslipidemia Yes    Essential hypertension, benign     Coronary artery disease involving native coronary artery of native heart without angina pectoris     Bilateral carotid artery stenosis     Aortic ectasia, thoracic (HCC)     Osteopenia of multiple sites     Vitamin D deficiency     PVC (premature ventricular contraction)     Mitral valve disorder     Overweight (BMI 25.0-29.9)     Multiple thyroid nodules     Gastroesophageal reflux disease, unspecified whether esophagitis present     Migraine without aura and without status migrainosus, not intractable     Stage 3a chronic kidney disease (HCC)     Rosacea     DDD (degenerative disc disease), lumbar     Opacity of lung on imaging study     Onychomycosis     History of adenomatous polyp of colon     Medication management     Vaccine counseling     Family history of kidney disease        Orders Placed This Encounter   Procedures    Comp Metabolic Panel (14)    Lipid Panel       Meds & Refills for this Visit:  Requested Prescriptions      No prescriptions requested or ordered in this  encounter       Imaging & Consults:  None       HTN -- pt is taking lisinopril 10 mg daily   DyslipidemiaCAD -- pt is taking lipitor - on rosuvastatin 20 mg nightly and repeat labs in 3 months to see if will get better LDL control  Migraines -- doing well  Rosacea -- stable per derm  Eczema -- stable  Osteopenia -- stable on fosamax; discussed weight bearing exercises; dexa UTD  GERD -- off pantoprazole  Lung opacity -- CT chest scheduled  Thyroid nodules -- per Dr. Cortez  Vaccine counseling -- stable; CPM  Overweight -- continue to focus on diet and exercise  Onychomycosis -- on ciclopirox  Aortic ectasia -- monitor 3.7 cm -- less than 4 cm is stable    Mammo per gyne - Dr. Hess; UTD  Dexa is UTD.  Colonoscopy due in 2031.  -- Dr. BHARGAV Young derm and gyne.  Advised Dr. Ling.  Labs ordered for 3 months.        The patient indicates understanding of these issues and agrees to the plan.  The patient is asked to return in Return in about 3 months (around 7/10/2024) for med check.

## 2024-04-15 ENCOUNTER — OFFICE VISIT (OUTPATIENT)
Facility: LOCATION | Age: 64
End: 2024-04-15
Payer: COMMERCIAL

## 2024-04-15 DIAGNOSIS — E04.2 MULTIPLE THYROID NODULES: Primary | ICD-10-CM

## 2024-04-15 PROCEDURE — 99214 OFFICE O/P EST MOD 30 MIN: CPT | Performed by: OTOLARYNGOLOGY

## 2024-04-15 NOTE — PROGRESS NOTES
Carlotta Vargas is a 63 year old female.   Chief Complaint   Patient presents with    Thyroid Problem     HPI:   63-year-old female seen for multinodular goiter strong family history for such.  Previously biopsied right thyroid nodule benign in for follow-up.    Good friend of  Terry  Current Outpatient Medications   Medication Sig Dispense Refill    alendronate 70 MG Oral Tab Take 1 tablet (70 mg total) by mouth every 7 days. 12 tablet 1    pantoprazole 20 MG Oral Tab EC Take 1 tablet (20 mg total) by mouth Before Dinner. 90 tablet 3    rosuvastatin 20 MG Oral Tab Take 1 tablet (20 mg total) by mouth nightly. 90 tablet 1    lisinopril 10 MG Oral Tab Take 1 tablet (10 mg total) by mouth daily. 90 tablet 1    Ciclopirox 8 % External Solution Apply 1 Application topically nightly. 6 mL 5    SULFACLEANSE 8/4 8-4 % External Suspension Apply 1 Application. topically daily.      Adapalene 0.3 % External Gel Differen OTC      ELIDEL 1 % External Cream  (Patient not taking: Reported on 1/9/2024)  2    Vitamin D3 2000 units Oral Cap Take 1 capsule (2,000 Units total) by mouth daily.      Calcium Carbonate 600 MG Oral Tab Take 1 tablet (600 mg total) by mouth as needed with dialysis for Heartburn.      Multiple Vitamins-Minerals (ONE-A-DAY WOMENS 50 PLUS OR) Take  by mouth.        Past Medical History:    Abdominal hernia    Constipation    occasionally    Diarrhea, unspecified    occasionally    Flatulence/gas pain/belching    It seems to be worse lately.    Heartburn    Hemorrhoids    High cholesterol    Hypercholesterolemia    Indigestion    Irregular bowel habits    Nausea    Night sweats    Other psoriasis    Painful swallowing    Problems with swallowing    Rosacea    Skin blushing/flushing    Unspecified essential hypertension    Vomiting    not stomach contents    Wears glasses      Social History:  Social History     Socioeconomic History    Marital status:    Tobacco Use    Smoking status:  Never    Smokeless tobacco: Never   Vaping Use    Vaping status: Never Used   Substance and Sexual Activity    Alcohol use: Yes     Alcohol/week: 3.0 standard drinks of alcohol    Drug use: No    Sexual activity: Yes     Partners: Male     Comment: 9/16/2021   Other Topics Concern    Caffeine Concern Yes     Comment: 2 a day    Exercise Yes     Comment: walks      Past Surgical History:   Procedure Laterality Date    Colonoscopy  2005, 2011    D & c  1985    Egd  12/1/2015    Endometrial ablation  2006    Thermal endometrial ablation    Hernia surgery      Inguinal hernia repair  1986    Other surgical history  1994    In Vitro Fertilization    Other surgical history  1996    ruptured ectopic    Tonsillectomy  1967         REVIEW OF SYSTEMS:   GENERAL HEALTH: feels well otherwise  GENERAL : denies fever, chills, sweats, weight loss, weight gain  SKIN: denies any unusual skin lesions or rashes  RESPIRATORY: denies shortness of breath with exertion  NEURO: denies headaches    EXAM:   There were no vitals taken for this visit.    System Pertinent findings Details   Constitutional  Overall appearance - Normal.   Head/Face  Facial features -- Normal. Skull - Normal.   Eyes  Pupils equal ,round ,react to light and accomidate   Ears  External Ear Right: Normal, Left: Normal. Canal - Right: Normal, Left: Normal. TM - Right: Normal left: Normal   Nose  External Nose, Normal, Septum -midline,Nasal Vault, clear. Turbinates - Right: Normal left: Normal   Mouth/Throat  Lips/teeth/gums - Normal. Tonsils -0+ oropharynx - Normal.   Neck Exam  Inspection - Normal. Palpation - Normal. Parotid gland - Normal. Thyroid gland -large with nodularity   Lymph Detail  Submental. Submandibular. Anterior cervical. Posterior cervical. Supraclavicular.   Multiple nodules noted bilaterally, 2 nodules have increased.  Right mid measuring 1.3 x 0.8 x 1.2  Left mid measuring 2.6 x 0.5 x 0.6 mostly cystic    ASSESSMENT AND PLAN:   1. Multiple thyroid  nodules  Biopsy of 2 nodules which increased in size results over the phone pending results further recommendation      The patient indicates understanding of these issues and agrees to the plan.      Robert Cortez MD  4/15/2024  2:12 PM

## 2024-04-26 ENCOUNTER — TELEPHONE (OUTPATIENT)
Facility: LOCATION | Age: 64
End: 2024-04-26

## 2024-04-26 ENCOUNTER — HOSPITAL ENCOUNTER (OUTPATIENT)
Dept: ULTRASOUND IMAGING | Facility: HOSPITAL | Age: 64
Discharge: HOME | End: 2024-04-26
Attending: OTOLARYNGOLOGY
Payer: COMMERCIAL

## 2024-04-26 DIAGNOSIS — E04.2 MULTIPLE THYROID NODULES: ICD-10-CM

## 2024-04-26 PROCEDURE — 88173 CYTOPATH EVAL FNA REPORT: CPT | Performed by: OTOLARYNGOLOGY

## 2024-04-26 PROCEDURE — 88172 CYTP DX EVAL FNA 1ST EA SITE: CPT | Performed by: OTOLARYNGOLOGY

## 2024-04-26 PROCEDURE — 10005 FNA BX W/US GDN 1ST LES: CPT | Performed by: OTOLARYNGOLOGY

## 2024-04-26 NOTE — TELEPHONE ENCOUNTER
Radiology department called stating that Pt is coming for a thyroid FNA. Radiologist stated that per protocol they cannot biopsy the left cyst due to it only being 6 ml. They do not biopsy for those type of sizes.

## 2024-04-29 ENCOUNTER — TELEPHONE (OUTPATIENT)
Facility: LOCATION | Age: 64
End: 2024-04-29

## 2024-04-29 NOTE — TELEPHONE ENCOUNTER
Results over the phone that the biopsy of her thyroid nodule was benign.  She is recommended a 1 year follow-up thyroid ultrasound with follow-up in the office we will send her a reminder via [facility].

## 2024-04-30 ENCOUNTER — TELEPHONE (OUTPATIENT)
Facility: LOCATION | Age: 64
End: 2024-04-30

## 2024-05-15 ENCOUNTER — HOSPITAL ENCOUNTER (OUTPATIENT)
Dept: CT IMAGING | Facility: HOSPITAL | Age: 64
Discharge: HOME | End: 2024-05-15
Attending: FAMILY MEDICINE

## 2024-05-15 DIAGNOSIS — R91.8 OPACITY OF LUNG ON IMAGING STUDY: ICD-10-CM

## 2024-05-15 PROCEDURE — 71250 CT THORAX DX C-: CPT | Performed by: FAMILY MEDICINE

## 2024-06-07 RX ORDER — LISINOPRIL 10 MG/1
10 TABLET ORAL DAILY
Qty: 90 TABLET | Refills: 1 | Status: SHIPPED | OUTPATIENT
Start: 2024-06-07

## 2024-06-07 NOTE — TELEPHONE ENCOUNTER
Hypertension Medications Protocol [id number]/07/2024 09:07 AM   Protocol Details EGFRCR or GFRNAA > 50    CMP or BMP in past 12 months    Last BP reading less than 140/90    In person appointment or virtual visit in the past 12 mos or appointment in next 3 mos   Last visit 1/9/2024  Future Appointments   Date Time Provider Department Center   7/10/2024  9:30 AM Barndie Kim DO [facility]     Last refill  CMP 4/5/2024  EGFRCR 4/5/2024 <50

## 2024-06-17 RX ORDER — ROSUVASTATIN CALCIUM 20 MG/1
20 TABLET, COATED ORAL NIGHTLY
Qty: 90 TABLET | Refills: 1 | Status: SHIPPED | OUTPATIENT
Start: 2024-06-17

## 2024-06-17 NOTE — TELEPHONE ENCOUNTER
Last office visit: 04/10/2024  Protocol: pass    Requested medication(s) are due for refill today: Yes    Requested medication(s) are on the active medication list same strength, form, dose/ sig: Yes    Requested medication(s) are managed by provider: Yes    Patient has already received a courtsey refill: No    NOV: 07/10/24  Last Labs: 10/26/23  Asked to Return: 7/10/2024

## 2024-07-02 DIAGNOSIS — M85.89 OSTEOPENIA OF MULTIPLE SITES: ICD-10-CM

## 2024-07-02 RX ORDER — ALENDRONATE SODIUM 70 MG/1
70 TABLET ORAL
Qty: 12 TABLET | Refills: 1 | Status: SHIPPED | OUTPATIENT
Start: 2024-07-02

## 2024-07-02 NOTE — TELEPHONE ENCOUNTER
Last office visit: 4/10/2024   Protocol: pass  Requested medication(s) are due for refill today: yes  Requested medication(s) are on the active medication list same strength, form, dose/ sig: yes  Requested medication(s) are managed by provider: yes  Patient has already received a courtsey refill: no    NOV: 7/10/2024  Last Labs: 4/5/2024  Asked to Return: 7/10/2024

## 2024-07-08 ENCOUNTER — LAB ENCOUNTER (OUTPATIENT)
Dept: LAB | Facility: REFERENCE LAB | Age: 64
End: 2024-07-08
Attending: FAMILY MEDICINE
Payer: COMMERCIAL

## 2024-07-08 DIAGNOSIS — I10 ESSENTIAL HYPERTENSION, BENIGN: ICD-10-CM

## 2024-07-08 DIAGNOSIS — Z79.899 MEDICATION MANAGEMENT: ICD-10-CM

## 2024-07-08 DIAGNOSIS — E78.5 DYSLIPIDEMIA: ICD-10-CM

## 2024-07-08 LAB
ALBUMIN SERPL-MCNC: 4.7 G/DL (ref 3.2–4.8)
ALBUMIN/GLOB SERPL: 1.7 {RATIO} (ref 1–2)
ALP LIVER SERPL-CCNC: 51 U/L
ALT SERPL-CCNC: 26 U/L
ANION GAP SERPL CALC-SCNC: 9 MMOL/L (ref 0–18)
AST SERPL-CCNC: 37 U/L (ref ?–34)
BILIRUB SERPL-MCNC: 0.5 MG/DL (ref 0.2–1.1)
BUN BLD-MCNC: 12 MG/DL (ref 9–23)
BUN/CREAT SERPL: 13.3 (ref 10–20)
CALCIUM BLD-MCNC: 9.4 MG/DL (ref 8.7–10.4)
CHLORIDE SERPL-SCNC: 108 MMOL/L (ref 98–112)
CHOLEST SERPL-MCNC: 287 MG/DL (ref ?–200)
CO2 SERPL-SCNC: 24 MMOL/L (ref 21–32)
CREAT BLD-MCNC: 0.9 MG/DL
EGFRCR SERPLBLD CKD-EPI 2021: 72 ML/MIN/1.73M2 (ref 60–?)
FASTING PATIENT LIPID ANSWER: YES
FASTING STATUS PATIENT QL REPORTED: YES
GLOBULIN PLAS-MCNC: 2.7 G/DL (ref 2–3.5)
GLUCOSE BLD-MCNC: 92 MG/DL (ref 70–99)
HDLC SERPL-MCNC: 73 MG/DL (ref 40–59)
LDLC SERPL CALC-MCNC: 178 MG/DL (ref ?–100)
NONHDLC SERPL-MCNC: 214 MG/DL (ref ?–130)
OSMOLALITY SERPL CALC.SUM OF ELEC: 291 MOSM/KG (ref 275–295)
POTASSIUM SERPL-SCNC: 4.3 MMOL/L (ref 3.5–5.1)
PROT SERPL-MCNC: 7.4 G/DL (ref 5.7–8.2)
SODIUM SERPL-SCNC: 141 MMOL/L (ref 136–145)
TRIGL SERPL-MCNC: 198 MG/DL (ref 30–149)
VLDLC SERPL CALC-MCNC: 40 MG/DL (ref 0–30)

## 2024-07-08 PROCEDURE — 36415 COLL VENOUS BLD VENIPUNCTURE: CPT

## 2024-07-08 PROCEDURE — 80053 COMPREHEN METABOLIC PANEL: CPT

## 2024-07-08 PROCEDURE — 80061 LIPID PANEL: CPT

## 2024-07-10 ENCOUNTER — OFFICE VISIT (OUTPATIENT)
Dept: FAMILY MEDICINE CLINIC | Facility: CLINIC | Age: 64
End: 2024-07-10
Payer: COMMERCIAL

## 2024-07-10 VITALS
DIASTOLIC BLOOD PRESSURE: 70 MMHG | WEIGHT: 181.25 LBS | HEIGHT: 69 IN | HEART RATE: 78 BPM | SYSTOLIC BLOOD PRESSURE: 118 MMHG | OXYGEN SATURATION: 99 % | BODY MASS INDEX: 26.84 KG/M2

## 2024-07-10 DIAGNOSIS — Z13.820 SCREENING FOR OSTEOPOROSIS: ICD-10-CM

## 2024-07-10 DIAGNOSIS — E55.9 VITAMIN D DEFICIENCY: ICD-10-CM

## 2024-07-10 DIAGNOSIS — I25.10 CORONARY ARTERY DISEASE INVOLVING NATIVE CORONARY ARTERY OF NATIVE HEART WITHOUT ANGINA PECTORIS: ICD-10-CM

## 2024-07-10 DIAGNOSIS — Z79.899 MEDICATION MANAGEMENT: ICD-10-CM

## 2024-07-10 DIAGNOSIS — I10 ESSENTIAL HYPERTENSION, BENIGN: Primary | ICD-10-CM

## 2024-07-10 DIAGNOSIS — G43.009 MIGRAINE WITHOUT AURA AND WITHOUT STATUS MIGRAINOSUS, NOT INTRACTABLE: ICD-10-CM

## 2024-07-10 DIAGNOSIS — I05.9 MITRAL VALVE DISORDER: ICD-10-CM

## 2024-07-10 DIAGNOSIS — Z00.00 LABORATORY EXAMINATION ORDERED AS PART OF A ROUTINE GENERAL MEDICAL EXAMINATION: ICD-10-CM

## 2024-07-10 DIAGNOSIS — I77.810 AORTIC ECTASIA, THORACIC (HCC): ICD-10-CM

## 2024-07-10 DIAGNOSIS — R06.02 SHORTNESS OF BREATH: ICD-10-CM

## 2024-07-10 DIAGNOSIS — R91.1 PULMONARY NODULE: ICD-10-CM

## 2024-07-10 DIAGNOSIS — N18.31 STAGE 3A CHRONIC KIDNEY DISEASE (HCC): ICD-10-CM

## 2024-07-10 DIAGNOSIS — I49.3 PVC (PREMATURE VENTRICULAR CONTRACTION): ICD-10-CM

## 2024-07-10 DIAGNOSIS — L71.9 ROSACEA: ICD-10-CM

## 2024-07-10 DIAGNOSIS — K21.9 GASTROESOPHAGEAL REFLUX DISEASE, UNSPECIFIED WHETHER ESOPHAGITIS PRESENT: ICD-10-CM

## 2024-07-10 DIAGNOSIS — M51.36 DDD (DEGENERATIVE DISC DISEASE), LUMBAR: ICD-10-CM

## 2024-07-10 DIAGNOSIS — E78.5 DYSLIPIDEMIA: ICD-10-CM

## 2024-07-10 DIAGNOSIS — E66.3 OVERWEIGHT (BMI 25.0-29.9): ICD-10-CM

## 2024-07-10 DIAGNOSIS — R91.8 OPACITY OF LUNG ON IMAGING STUDY: ICD-10-CM

## 2024-07-10 DIAGNOSIS — Z71.85 VACCINE COUNSELING: ICD-10-CM

## 2024-07-10 DIAGNOSIS — E04.2 MULTIPLE THYROID NODULES: ICD-10-CM

## 2024-07-10 DIAGNOSIS — M85.89 OSTEOPENIA OF MULTIPLE SITES: ICD-10-CM

## 2024-07-10 DIAGNOSIS — Z13.89 SCREENING FOR GENITOURINARY CONDITION: ICD-10-CM

## 2024-07-10 DIAGNOSIS — I65.23 BILATERAL CAROTID ARTERY STENOSIS: ICD-10-CM

## 2024-07-10 PROCEDURE — 99214 OFFICE O/P EST MOD 30 MIN: CPT | Performed by: FAMILY MEDICINE

## 2024-07-10 NOTE — PROGRESS NOTES
Carlotta Vargas is a 64 year old female.  HPI:   Pt. Is here for a med check.    HTN -- pt is taking lisinopril -- no side effects; does not check BP at home  DyslipidemiaCAD -- pt is taking crestor-- no side effects; LDL is still high; not taking it daily  - -aware she needs to be better  Migraines -- stable; cpm  Rosacea --  per derm and not seen her for some time  Thyroid nodules -- stable per Dr. Cortez  Eczema -- stable -- per derm  Gyne -- Dr. Hess  Last eye exam -- 12/12/23  Mother and father with hx of kidney disease.  PT. Complains of shortness of breath while she plays her trumpet for the past year.  No hx of smoking.      Meds reviewed.      Current Outpatient Medications   Medication Sig Dispense Refill    alendronate 70 MG Oral Tab TAKE 1 TABLET BY MOUTH WEEKLY  WITH 8 OZ OF PLAIN WATER 30  MINUTES BEFORE FIRST FOOD, DRINK OR MEDS. STAY UPRIGHT FOR 30  MINS 12 tablet 1    rosuvastatin 20 MG Oral Tab TAKE 1 TABLET BY MOUTH EVERY  NIGHT 90 tablet 1    lisinopril 10 MG Oral Tab TAKE 1 TABLET BY MOUTH DAILY 90 tablet 1    pantoprazole 20 MG Oral Tab EC Take 1 tablet (20 mg total) by mouth Before Dinner. 90 tablet 3    Ciclopirox 8 % External Solution Apply 1 Application topically nightly. 6 mL 5    SULFACLEANSE 8/4 8-4 % External Suspension Apply 1 Application. topically daily.      Adapalene 0.3 % External Gel Differen OTC      Vitamin D3 2000 units Oral Cap Take 1 capsule (2,000 Units total) by mouth daily.      Calcium Carbonate 600 MG Oral Tab Take 1 tablet (600 mg total) by mouth as needed with dialysis for Heartburn.      Multiple Vitamins-Minerals (ONE-A-DAY WOMENS 50 PLUS OR) Take  by mouth.        No Known Allergies   Past Medical History:    Abdominal hernia    Constipation    occasionally    Diarrhea, unspecified    occasionally    Flatulence/gas pain/belching    It seems to be worse lately.    Heartburn    Hemorrhoids    High cholesterol    Hypercholesterolemia    Indigestion    Irregular  bowel habits    Nausea    Night sweats    Other psoriasis    Painful swallowing    Problems with swallowing    Rosacea    Skin blushing/flushing    Unspecified essential hypertension    Vomiting    not stomach contents    Wears glasses      Social History:  Social History     Socioeconomic History    Marital status:    Tobacco Use    Smoking status: Never    Smokeless tobacco: Never   Vaping Use    Vaping status: Never Used   Substance and Sexual Activity    Alcohol use: Yes     Alcohol/week: 3.0 standard drinks of alcohol    Drug use: No    Sexual activity: Yes     Partners: Male     Comment: 9/16/2021   Other Topics Concern    Caffeine Concern Yes     Comment: 2 a day    Exercise Yes     Comment: walks        Results for orders placed or performed in visit on 07/08/24   Comp Metabolic Panel (14)   Result Value Ref Range    Glucose 92 70 - 99 mg/dL    Sodium 141 136 - 145 mmol/L    Potassium 4.3 3.5 - 5.1 mmol/L    Chloride 108 98 - 112 mmol/L    CO2 24.0 21.0 - 32.0 mmol/L    Anion Gap 9 0 - 18 mmol/L    BUN 12 9 - 23 mg/dL    Creatinine 0.90 0.55 - 1.02 mg/dL    BUN/CREA Ratio 13.3 10.0 - 20.0    Calcium, Total 9.4 8.7 - 10.4 mg/dL    Calculated Osmolality 291 275 - 295 mOsm/kg    eGFR-Cr 72 >=60 mL/min/1.73m2    ALT 26 10 - 49 U/L    AST 37 (H) <=34 U/L    Alkaline Phosphatase 51 50 - 130 U/L    Bilirubin, Total 0.5 0.2 - 1.1 mg/dL    Total Protein 7.4 5.7 - 8.2 g/dL    Albumin 4.7 3.2 - 4.8 g/dL    Globulin  2.7 2.0 - 3.5 g/dL    A/G Ratio 1.7 1.0 - 2.0    Patient Fasting for CMP? Yes    Lipid Panel   Result Value Ref Range    Cholesterol, Total 287 (H) <200 mg/dL    HDL Cholesterol 73 (H) 40 - 59 mg/dL    Triglycerides 198 (H) 30 - 149 mg/dL    LDL Cholesterol 178 (H) <100 mg/dL    VLDL 40 (H) 0 - 30 mg/dL    Non HDL Chol 214 (H) <130 mg/dL    Patient Fasting for Lipid? Yes        REVIEW OF SYSTEMS:   GENERAL: feels well otherwise  SKIN: eczema and rosacea; eyebrow thinning  EYES:denies blurred vision  or double vision  HEENT:  nasal congestion, sinus pain or ST  LUNGS: denies shortness of breath with exertion  CARDIOVASCULAR: denies chest pain on exertion  GI: denies abdominal pain,denies heartburn  : denies dysuria  MUSCULOSKELETAL: no back pain  NEURO: denies headaches  PSYCHE: denies depression or anxiety  HEMATOLOGIC:  hx of anemia  ENDOCRINE: denies thyroid history  ALL/ASTHMA: denies hx of allergy or asthma    EXAM:   /70 (BP Location: Left arm, Patient Position: Sitting, Cuff Size: adult)   Pulse 78   Ht 5' 9\" (1.753 m)   Wt 181 lb 4 oz (82.2 kg)   SpO2 99%   BMI 26.77 kg/m²   GENERAL: well developed, well nourished,in no apparent distress  PSYCHE: normal mood and affect  SKIN: no rashes,no suspicious lesions  NECK: supple,no adenopathy,no bruits, thyroid nodules  LUNGS: clear to auscultation  CARDIO: RRR without murmur  GI: good BS's,no masses, HSM or tenderness  EXTREMITIES: no cyanosis, clubbing or edema    ASSESSMENT AND PLAN:     Encounter Diagnoses   Name Primary?    Essential hypertension, benign Yes    Dyslipidemia     Coronary artery disease involving native coronary artery of native heart without angina pectoris     Bilateral carotid artery stenosis     Aortic ectasia, thoracic (HCC)     Vitamin D deficiency     Osteopenia of multiple sites     PVC (premature ventricular contraction)     Mitral valve disorder     Overweight (BMI 25.0-29.9)     Multiple thyroid nodules     Gastroesophageal reflux disease, unspecified whether esophagitis present     Migraine without aura and without status migrainosus, not intractable     Stage 3a chronic kidney disease (HCC)     Rosacea     DDD (degenerative disc disease), lumbar     Pulmonary nodule     Opacity of lung on imaging study     Medication management     Vaccine counseling     Shortness of breath     Screening for osteoporosis     Laboratory examination ordered as part of a routine general medical examination     Screening for  genitourinary condition          Orders Placed This Encounter   Procedures    CBC With Differential With Platelet    Comp Metabolic Panel (14)    TSH W Reflex To Free T4    Lipid Panel    Vitamin D, 25-Hydroxy    Urinalysis, Routine       Meds & Refills for this Visit:  Requested Prescriptions      No prescriptions requested or ordered in this encounter       Imaging & Consults:  CT CHEST (CPT=71250)  XR DEXA BONE DENSITOMETRY (CPT=77080)       HTN -- pt is taking lisinopril 10 mg daily   DyslipidemiaCAD -- rosuvastatin 20 mg nightly-- will try to take consistently  Migraines -- doing well  Rosacea -- stable per derm  Eczema -- stable  Osteopenia -- stable on fosamax; discussed weight bearing exercises; dexa UTD  GERD -- off pantoprazole  Lung opacity -- CT stable; no risk for 4 mm pulmonary nodule  -- repeat CT chest in 5/2025  Thyroid nodules -- per Dr. Cortez  Vaccine counseling -- stable; CPM  Overweight -- continue to focus on diet and exercise  Onychomycosis -- on ciclopirox  Aortic ectasia -- monitor 3.7 cm -- less than 4 cm is stable    Mammo per gyne - Dr. Hess; UTD  Dexa is UTD.  Colonoscopy due in 2031.  -- Dr. DURANT  Sees derm and gyne.    Labs ordered for 3 months.        The patient indicates understanding of these issues and agrees to the plan.  The patient is asked to return in Return in about 3 months (around 10/10/2024) for med check  and schedule physical.

## 2024-08-02 ENCOUNTER — OFFICE VISIT (OUTPATIENT)
Dept: FAMILY MEDICINE CLINIC | Facility: CLINIC | Age: 64
End: 2024-08-02
Payer: COMMERCIAL

## 2024-08-02 VITALS
RESPIRATION RATE: 18 BRPM | HEIGHT: 69 IN | SYSTOLIC BLOOD PRESSURE: 120 MMHG | OXYGEN SATURATION: 99 % | HEART RATE: 85 BPM | DIASTOLIC BLOOD PRESSURE: 78 MMHG | WEIGHT: 185 LBS | BODY MASS INDEX: 27.4 KG/M2

## 2024-08-02 DIAGNOSIS — M25.541 ARTHRALGIA OF RIGHT HAND: ICD-10-CM

## 2024-08-02 DIAGNOSIS — M25.551 PAIN OF RIGHT HIP JOINT: ICD-10-CM

## 2024-08-02 DIAGNOSIS — R22.31 LOCALIZED SWELLING OF RIGHT THUMB: Primary | ICD-10-CM

## 2024-08-02 DIAGNOSIS — Z82.61 FAMILY HISTORY OF RHEUMATOID ARTHRITIS: ICD-10-CM

## 2024-08-02 DIAGNOSIS — M79.644 PAIN OF RIGHT THUMB: ICD-10-CM

## 2024-08-02 LAB
BASOPHILS # BLD AUTO: 0.03 X10(3) UL (ref 0–0.2)
BASOPHILS NFR BLD AUTO: 0.5 %
CRP SERPL-MCNC: <0.4 MG/DL (ref ?–0.5)
EOSINOPHIL # BLD AUTO: 0.12 X10(3) UL (ref 0–0.7)
EOSINOPHIL NFR BLD AUTO: 2.1 %
ERYTHROCYTE [DISTWIDTH] IN BLOOD BY AUTOMATED COUNT: 13.8 %
ERYTHROCYTE [SEDIMENTATION RATE] IN BLOOD: 47 MM/HR
HCT VFR BLD AUTO: 37.9 %
HGB BLD-MCNC: 12.7 G/DL
IMM GRANULOCYTES # BLD AUTO: 0.02 X10(3) UL (ref 0–1)
IMM GRANULOCYTES NFR BLD: 0.4 %
LYMPHOCYTES # BLD AUTO: 1.25 X10(3) UL (ref 1–4)
LYMPHOCYTES NFR BLD AUTO: 22 %
MCH RBC QN AUTO: 30.5 PG (ref 26–34)
MCHC RBC AUTO-ENTMCNC: 33.5 G/DL (ref 31–37)
MCV RBC AUTO: 91.1 FL
MONOCYTES # BLD AUTO: 0.44 X10(3) UL (ref 0.1–1)
MONOCYTES NFR BLD AUTO: 7.7 %
NEUTROPHILS # BLD AUTO: 3.83 X10 (3) UL (ref 1.5–7.7)
NEUTROPHILS # BLD AUTO: 3.83 X10(3) UL (ref 1.5–7.7)
NEUTROPHILS NFR BLD AUTO: 67.3 %
PLATELET # BLD AUTO: 350 10(3)UL (ref 150–450)
RBC # BLD AUTO: 4.16 X10(6)UL
RHEUMATOID FACT SERPL-ACNC: <3.5 IU/ML (ref ?–14)
URATE SERPL-MCNC: 3.3 MG/DL
WBC # BLD AUTO: 5.7 X10(3) UL (ref 4–11)

## 2024-08-02 PROCEDURE — 86200 CCP ANTIBODY: CPT

## 2024-08-02 PROCEDURE — 86038 ANTINUCLEAR ANTIBODIES: CPT

## 2024-08-02 PROCEDURE — 86140 C-REACTIVE PROTEIN: CPT

## 2024-08-02 PROCEDURE — 99214 OFFICE O/P EST MOD 30 MIN: CPT

## 2024-08-02 PROCEDURE — 86235 NUCLEAR ANTIGEN ANTIBODY: CPT

## 2024-08-02 PROCEDURE — 85025 COMPLETE CBC W/AUTO DIFF WBC: CPT

## 2024-08-02 PROCEDURE — 86225 DNA ANTIBODY NATIVE: CPT

## 2024-08-02 PROCEDURE — 86039 ANTINUCLEAR ANTIBODIES (ANA): CPT

## 2024-08-02 PROCEDURE — 85652 RBC SED RATE AUTOMATED: CPT

## 2024-08-02 PROCEDURE — 84550 ASSAY OF BLOOD/URIC ACID: CPT

## 2024-08-02 PROCEDURE — 86431 RHEUMATOID FACTOR QUANT: CPT

## 2024-08-02 RX ORDER — IBUPROFEN 600 MG/1
TABLET ORAL
COMMUNITY
Start: 2024-08-01

## 2024-08-02 NOTE — PATIENT INSTRUCTIONS
Continue to monitor swelling. Can use ice, ibuprofen, or topical diclofenac (Voltaren gel).     Will do labs and check for uric acid (gout), and rheumatoid/arthritis/inflammatory markers to see if any are abnormal. Were all within normal limits when checked in 2018. Family history of rheumatoid arthritis.     Please let us know if the swelling does not resolve.     I will follow up with you once all lab results come in.

## 2024-08-02 NOTE — PROGRESS NOTES
Subjective:   Carlotta Vargas is a 64 year old female who presents for ER follow up. (Was in [place]). Right thumb pain. Swelling has gone down. Denies trauma. Did touch some weeds, said ER thought maybe allergic in nature. Sporadic swelling to thumb while she was eating dinner. Pain was 5-6/10 and is now down to 1/10, but still slight swelling. Was given ibuprofen from ER. Helped a little.     Right hip had also hurt when pt first arrived in [place], but it cleared up the next day.       History/Other:    Chief Complaint Reviewed and Verified  Nursing Notes Reviewed and   Verified  Tobacco Reviewed  Allergies Reviewed  Medications Reviewed    Problem List Reviewed  Medical History Reviewed  Surgical History   Reviewed  OB Status Reviewed  Family History Reviewed  Social History   Reviewed         Tobacco:  She has never smoked tobacco.    Current Outpatient Medications   Medication Sig Dispense Refill    ibuprofen 600 MG Oral Tab       alendronate 70 MG Oral Tab TAKE 1 TABLET BY MOUTH WEEKLY  WITH 8 OZ OF PLAIN WATER 30  MINUTES BEFORE FIRST FOOD, DRINK OR MEDS. STAY UPRIGHT FOR 30  MINS 12 tablet 1    rosuvastatin 20 MG Oral Tab TAKE 1 TABLET BY MOUTH EVERY  NIGHT 90 tablet 1    lisinopril 10 MG Oral Tab TAKE 1 TABLET BY MOUTH DAILY 90 tablet 1    pantoprazole 20 MG Oral Tab EC Take 1 tablet (20 mg total) by mouth Before Dinner. 90 tablet 3    Ciclopirox 8 % External Solution Apply 1 Application topically nightly. 6 mL 5    SULFACLEANSE 8/4 8-4 % External Suspension Apply 1 Application. topically daily.      Adapalene 0.3 % External Gel Differen OTC      Vitamin D3 2000 units Oral Cap Take 1 capsule (2,000 Units total) by mouth daily.      Calcium Carbonate 600 MG Oral Tab Take 1 tablet (600 mg total) by mouth as needed with dialysis for Heartburn.      Multiple Vitamins-Minerals (ONE-A-DAY WOMENS 50 PLUS OR) Take  by mouth.           Review of Systems:  Review of Systems   Constitutional: Negative.     HENT: Negative.     Eyes: Negative.    Respiratory: Negative.     Cardiovascular: Negative.    Gastrointestinal: Negative.    Endocrine: Negative.    Genitourinary: Negative.    Musculoskeletal:  Positive for joint swelling.   Skin: Negative.  Negative for color change, rash and wound.   Allergic/Immunologic:        Had allergies as a child. Takes Claritin as needed.    Neurological: Negative.    Hematological: Negative.    Psychiatric/Behavioral: Negative.           Objective:   /78   Pulse 85   Resp 18   Ht 5' 9\" (1.753 m)   Wt 185 lb (83.9 kg)   SpO2 99%   BMI 27.32 kg/m²  Estimated body mass index is 27.32 kg/m² as calculated from the following:    Height as of this encounter: 5' 9\" (1.753 m).    Weight as of this encounter: 185 lb (83.9 kg).    Physical Exam  Vitals and nursing note reviewed.   Constitutional:       General: She is not in acute distress.     Appearance: Normal appearance. She is not ill-appearing, toxic-appearing or diaphoretic.   HENT:      Head: Normocephalic and atraumatic.   Eyes:      General: No scleral icterus.        Right eye: No discharge.         Left eye: No discharge.      Conjunctiva/sclera: Conjunctivae normal.   Cardiovascular:      Rate and Rhythm: Normal rate and regular rhythm.      Pulses: Normal pulses.      Heart sounds: Normal heart sounds. No murmur heard.     No friction rub. No gallop.   Pulmonary:      Effort: Pulmonary effort is normal. No respiratory distress.      Breath sounds: Normal breath sounds. No stridor. No wheezing, rhonchi or rales.   Chest:      Chest wall: No tenderness.   Musculoskeletal:         General: Swelling present.      Comments: Swelling at base of right thumb - limited ROM   Skin:     General: Skin is warm and dry.   Neurological:      General: No focal deficit present.      Mental Status: She is alert and oriented to person, place, and time.   Psychiatric:         Mood and Affect: Mood normal.         Behavior: Behavior  normal.         Thought Content: Thought content normal.         Judgment: Judgment normal.         Assessment & Plan:   1. Localized swelling of right thumb (Primary)  -     Rheumatoid Arthritis Factor; Future; Expected date: 08/02/2024  -     Uric Acid; Future; Expected date: 08/02/2024  -     Cyclic Citrullinate Pep. IGG; Future; Expected date: 08/02/2024  -     Sed Rate, Westergren (Automated); Future; Expected date: 08/02/2024  -     C-Reactive Protein; Future; Expected date: 08/02/2024  -     Cancel: Nisreen, Direct, Reflex To 9 Enas; Future; Expected date: 08/02/2024  -     CBC With Differential With Platelet; Future; Expected date: 08/02/2024  -     Rheumatoid Arthritis Factor  -     Uric Acid  -     Cyclic Citrullinate Pep. IGG  -     Sed Rate, Westergren (Automated)  -     C-Reactive Protein  -     CBC With Differential With Platelet  -     Anti-Nuclear Antibody (NISREEN) by IFA, Reflex Titer + Specific Antibodies; Future; Expected date: 08/02/2024  -     Anti-Nuclear Antibody (NISREEN) by IFA, Reflex Titer + Specific Antibodies  2. Pain of right thumb  -     Rheumatoid Arthritis Factor; Future; Expected date: 08/02/2024  -     Uric Acid; Future; Expected date: 08/02/2024  -     Cyclic Citrullinate Pep. IGG; Future; Expected date: 08/02/2024  -     Sed Rate, Westergren (Automated); Future; Expected date: 08/02/2024  -     C-Reactive Protein; Future; Expected date: 08/02/2024  -     Cancel: Nisreen, Direct, Reflex To 9 Enas; Future; Expected date: 08/02/2024  -     CBC With Differential With Platelet; Future; Expected date: 08/02/2024  -     Rheumatoid Arthritis Factor  -     Uric Acid  -     Cyclic Citrullinate Pep. IGG  -     Sed Rate, Westergren (Automated)  -     C-Reactive Protein  -     CBC With Differential With Platelet  -     Anti-Nuclear Antibody (NISREEN) by IFA, Reflex Titer + Specific Antibodies; Future; Expected date: 08/02/2024  -     Anti-Nuclear Antibody (NISREEN) by IFA, Reflex Titer + Specific Antibodies  3.  Arthralgia of right hand  -     Rheumatoid Arthritis Factor; Future; Expected date: 08/02/2024  -     Uric Acid; Future; Expected date: 08/02/2024  -     Cyclic Citrullinate Pep. IGG; Future; Expected date: 08/02/2024  -     Sed Rate, Westergren (Automated); Future; Expected date: 08/02/2024  -     C-Reactive Protein; Future; Expected date: 08/02/2024  -     Cancel: Nisreen, Direct, Reflex To 9 Enas; Future; Expected date: 08/02/2024  -     CBC With Differential With Platelet; Future; Expected date: 08/02/2024  -     Rheumatoid Arthritis Factor  -     Uric Acid  -     Cyclic Citrullinate Pep. IGG  -     Sed Rate, Westergren (Automated)  -     C-Reactive Protein  -     CBC With Differential With Platelet  -     Anti-Nuclear Antibody (NISREEN) by IFA, Reflex Titer + Specific Antibodies; Future; Expected date: 08/02/2024  -     Anti-Nuclear Antibody (NISREEN) by IFA, Reflex Titer + Specific Antibodies  4. Pain of right hip joint  -     Rheumatoid Arthritis Factor; Future; Expected date: 08/02/2024  -     Uric Acid; Future; Expected date: 08/02/2024  -     Cyclic Citrullinate Pep. IGG; Future; Expected date: 08/02/2024  -     Sed Rate, Westergren (Automated); Future; Expected date: 08/02/2024  -     C-Reactive Protein; Future; Expected date: 08/02/2024  -     Cancel: Nisreen, Direct, Reflex To 9 Enas; Future; Expected date: 08/02/2024  -     CBC With Differential With Platelet; Future; Expected date: 08/02/2024  -     Rheumatoid Arthritis Factor  -     Uric Acid  -     Cyclic Citrullinate Pep. IGG  -     Sed Rate, Westergren (Automated)  -     C-Reactive Protein  -     CBC With Differential With Platelet  -     Anti-Nuclear Antibody (NISREEN) by IFA, Reflex Titer + Specific Antibodies; Future; Expected date: 08/02/2024  -     Anti-Nuclear Antibody (NISREEN) by IFA, Reflex Titer + Specific Antibodies  5. Family history of rheumatoid arthritis  -     Rheumatoid Arthritis Factor; Future; Expected date: 08/02/2024  -     Uric Acid; Future;  Expected date: 08/02/2024  -     Cyclic Citrullinate Pep. IGG; Future; Expected date: 08/02/2024  -     Sed Rate, Westergren (Automated); Future; Expected date: 08/02/2024  -     C-Reactive Protein; Future; Expected date: 08/02/2024  -     Cancel: Nisreen, Direct, Reflex To 9 Enas; Future; Expected date: 08/02/2024  -     CBC With Differential With Platelet; Future; Expected date: 08/02/2024  -     Rheumatoid Arthritis Factor  -     Uric Acid  -     Cyclic Citrullinate Pep. IGG  -     Sed Rate, Westergren (Automated)  -     C-Reactive Protein  -     CBC With Differential With Platelet  -     Anti-Nuclear Antibody (NISREEN) by IFA, Reflex Titer + Specific Antibodies; Future; Expected date: 08/02/2024  -     Anti-Nuclear Antibody (NISREEN) by IFA, Reflex Titer + Specific Antibodies  Continue to monitor swelling. Can use ice, ibuprofen, or topical diclofenac (Voltaren gel).     Will do labs and check for uric acid (gout), and rheumatoid/arthritis/inflammatory markers to see if any are abnormal. Were all within normal limits when checked in 2018. Family history of rheumatoid arthritis.     Please let us know if the swelling does not resolve.     I will follow up with you once all lab results come in.       Return in about 3 months (around 10/28/2024) for keep appt with Dr Kim.    Phuong Oliver, SAMIA, 8/2/2024, 9:26 AM

## 2024-08-05 LAB
CCP IGG SERPL-ACNC: 1.1 U/ML (ref 0–6.9)
NUCLEAR IGG TITR SER IF: POSITIVE {TITER}

## 2024-08-06 ENCOUNTER — RT VISIT (OUTPATIENT)
Dept: RESPIRATORY THERAPY | Facility: HOSPITAL | Age: 64
End: 2024-08-06
Attending: FAMILY MEDICINE
Payer: COMMERCIAL

## 2024-08-06 DIAGNOSIS — R06.02 SHORTNESS OF BREATH: ICD-10-CM

## 2024-08-06 LAB
CENTROMERE IGG SER-ACNC: <0.4 U/ML
DSDNA AB TITR SER: <10 {TITER}
ENA JO1 AB SER IA-ACNC: <0.3 U/ML
ENA RNP IGG SER IA-ACNC: 0.4 U/ML
ENA SCL70 IGG SER IA-ACNC: 1 U/ML
ENA SM IGG SER IA-ACNC: <0.7 U/ML
ENA SS-A IGG SER IA-ACNC: <0.4 U/ML
ENA SS-B IGG SER IA-ACNC: <0.4 U/ML
U1 SNRNP IGG SER IA-ACNC: 1 U/ML

## 2024-08-06 PROCEDURE — 94010 BREATHING CAPACITY TEST: CPT | Performed by: INTERNAL MEDICINE

## 2024-08-06 PROCEDURE — 94729 DIFFUSING CAPACITY: CPT | Performed by: INTERNAL MEDICINE

## 2024-08-06 PROCEDURE — 94726 PLETHYSMOGRAPHY LUNG VOLUMES: CPT | Performed by: INTERNAL MEDICINE

## 2024-08-07 LAB — ANA NUCLEOLAR TITR SER IF: 320 {TITER}

## 2024-08-08 ENCOUNTER — PATIENT MESSAGE (OUTPATIENT)
Dept: FAMILY MEDICINE CLINIC | Facility: CLINIC | Age: 64
End: 2024-08-08

## 2024-08-08 DIAGNOSIS — R76.8 POSITIVE ANA (ANTINUCLEAR ANTIBODY): Primary | ICD-10-CM

## 2024-08-08 DIAGNOSIS — R22.31 LOCALIZED SWELLING OF RIGHT THUMB: ICD-10-CM

## 2024-08-08 DIAGNOSIS — R70.0 ELEVATED SED RATE: ICD-10-CM

## 2024-08-08 DIAGNOSIS — M25.541 ARTHRALGIA OF RIGHT HAND: ICD-10-CM

## 2024-08-08 DIAGNOSIS — Z82.61 FAMILY HISTORY OF RHEUMATOID ARTHRITIS: ICD-10-CM

## 2024-08-08 NOTE — PROCEDURES
Pulmonary Function Test:   Findings:  Spirometry: FEV1 is 2.64 L, 95% predicted with a Z-score of -0.31. FVC is 3.15 L, 88% predicted and FEV1/ FVC ratio is 0.84.  The flow-volume loop demonstrates a normal pattern.   Lung Volumes:                                                                     The TLC is 6.17 L, 103% predicted.  The residual volume 2.85 L, 135% predicted.  With a Z-score of 1.64  Diffusion Capacity:  The diffusion capacity is 26.45 or 120% predicted with a Z-score of 1.16 and 123% predicted when corrected for alveolar volume.     Impression:  There is no airway obstruction or restrictive process  on spirometry and visualized on flow-volume loop.     Lung volumes appear within normal limits    Diffusion capacity appears within normal limits     There are no previous pulmonary function tests available for comparison.     Disclaimer: This PFT has been interpreted in accordance to ATS/ERS interpretation guidelines 2022, with the use of upper and lower limits of normal as well as z-score references. Previous testing (before March 2024) was not performed at [facility] using z-scores and so comparison to previous testing should be taken with caution.    Marvin He MD

## 2024-08-08 NOTE — TELEPHONE ENCOUNTER
Pt asking about NISREEN, advised of Autumn's recommendation for rheum referral    Pt wondering if she is ok to donate blood?  Pls advise, thank you!

## 2024-08-08 NOTE — TELEPHONE ENCOUNTER
From: Carlotta Vargas  To: Phuong Melody  Sent: 8/8/2024 7:41 AM CDT  Subject: NISREEN results     Hi,  I’m just following up on my blood test results. I’m wondering how concerned I should be about a positive NISREEN test and the NISREEN Tiger/Pattern result of 320 when normal is below 80.   The good news about my thumb is that the pain level is going down. At the beginning of the week I was worried because the pain level was increasing, but yesterday was better. This morning I have a little pain, but it’s not bad. I think the swelling is almost gone.   I’m eligible to donate blood starting today. Is there any reason I should not because of this?  Thanks,   Radha

## 2024-08-26 DIAGNOSIS — M85.89 OSTEOPENIA OF MULTIPLE SITES: ICD-10-CM

## 2024-08-26 RX ORDER — ALENDRONATE SODIUM 70 MG/1
70 TABLET ORAL
Qty: 12 TABLET | Refills: 1 | Status: SHIPPED | OUTPATIENT
Start: 2024-08-26

## 2024-08-26 NOTE — TELEPHONE ENCOUNTER
Last office visit: 7/10/2024   Protocol: pass  Requested medication(s) are due for refill today: yes  Requested medication(s) are on the active medication list same strength, form, dose/ sig: yes  Requested medication(s) are managed by provider: yes  Patient has already received a courtsey refill: no    NOV: 10/28/2024  Last Labs: 10/26/2023  Asked to Return: 10/10/2024

## 2024-08-29 ENCOUNTER — HOSPITAL ENCOUNTER (OUTPATIENT)
Dept: BONE DENSITY | Age: 64
Discharge: HOME | End: 2024-08-29
Attending: FAMILY MEDICINE
Payer: COMMERCIAL

## 2024-08-29 DIAGNOSIS — Z13.820 SCREENING FOR OSTEOPOROSIS: ICD-10-CM

## 2024-08-29 PROCEDURE — 77080 DXA BONE DENSITY AXIAL: CPT | Performed by: FAMILY MEDICINE

## 2024-09-30 RX ORDER — LISINOPRIL 10 MG/1
10 TABLET ORAL DAILY
Qty: 90 TABLET | Refills: 1 | OUTPATIENT
Start: 2024-09-30

## 2024-10-20 ENCOUNTER — TELEPHONE (OUTPATIENT)
Dept: FAMILY MEDICINE CLINIC | Facility: CLINIC | Age: 64
End: 2024-10-20

## 2024-10-20 NOTE — TELEPHONE ENCOUNTER
Pt called answering service reporting that she is covid +. I spoke to her yesterday regarding her  being +. She is interested in paxlovid. Has taken previously with no issues. Advised to hold rosuvastatin and vitamins while on paxlovid.   Sx onset: Sat (10/19) - sore throat  Today is congested coughing headache, but ST is improved.   Advised of OTC meds to use for sx.   Advised to f/u in office if needed this week.

## 2024-10-21 ENCOUNTER — PATIENT MESSAGE (OUTPATIENT)
Dept: FAMILY MEDICINE CLINIC | Facility: CLINIC | Age: 64
End: 2024-10-21

## 2024-10-28 ENCOUNTER — TELEMEDICINE (OUTPATIENT)
Dept: FAMILY MEDICINE CLINIC | Facility: CLINIC | Age: 64
End: 2024-10-28
Payer: COMMERCIAL

## 2024-10-28 DIAGNOSIS — Z79.899 MEDICATION MANAGEMENT: ICD-10-CM

## 2024-10-28 DIAGNOSIS — I49.3 PVC (PREMATURE VENTRICULAR CONTRACTION): ICD-10-CM

## 2024-10-28 DIAGNOSIS — E78.5 DYSLIPIDEMIA: ICD-10-CM

## 2024-10-28 DIAGNOSIS — E04.2 MULTIPLE THYROID NODULES: ICD-10-CM

## 2024-10-28 DIAGNOSIS — I65.23 BILATERAL CAROTID ARTERY STENOSIS: ICD-10-CM

## 2024-10-28 DIAGNOSIS — I25.10 CORONARY ARTERY DISEASE INVOLVING NATIVE CORONARY ARTERY OF NATIVE HEART WITHOUT ANGINA PECTORIS: ICD-10-CM

## 2024-10-28 DIAGNOSIS — M85.89 OSTEOPENIA OF MULTIPLE SITES: ICD-10-CM

## 2024-10-28 DIAGNOSIS — E55.9 VITAMIN D DEFICIENCY: ICD-10-CM

## 2024-10-28 DIAGNOSIS — N18.31 STAGE 3A CHRONIC KIDNEY DISEASE (HCC): ICD-10-CM

## 2024-10-28 DIAGNOSIS — I10 ESSENTIAL HYPERTENSION, BENIGN: Primary | ICD-10-CM

## 2024-10-28 DIAGNOSIS — I77.810 AORTIC ECTASIA, THORACIC (HCC): ICD-10-CM

## 2024-10-28 DIAGNOSIS — I05.9 MITRAL VALVE DISORDER: ICD-10-CM

## 2024-10-28 DIAGNOSIS — K21.9 GASTROESOPHAGEAL REFLUX DISEASE, UNSPECIFIED WHETHER ESOPHAGITIS PRESENT: ICD-10-CM

## 2024-10-28 DIAGNOSIS — L71.9 ROSACEA: ICD-10-CM

## 2024-10-28 DIAGNOSIS — G43.009 MIGRAINE WITHOUT AURA AND WITHOUT STATUS MIGRAINOSUS, NOT INTRACTABLE: ICD-10-CM

## 2024-10-28 DIAGNOSIS — Z71.85 VACCINE COUNSELING: ICD-10-CM

## 2024-10-28 RX ORDER — ROSUVASTATIN CALCIUM 20 MG/1
20 TABLET, COATED ORAL NIGHTLY
Qty: 90 TABLET | Refills: 1 | Status: SHIPPED | OUTPATIENT
Start: 2024-10-28

## 2024-10-28 NOTE — PROGRESS NOTES
Carlotta Vargasverbally consents to a virtual check in-service on 10/28/2024.  Patient understands and accepts the financial responsibility for any deductible, coinsurance, and/or co-pays associated with the service.    Carlotta Vargas is a 64 year old female.  HPI:   PT. Was recently on cruise and  got it and then she test right away and paxlovid helped.  Feeling much better.  Her mother is 91 today.    HTN -- pt is taking lisinopril -- no side effects; does not check BP at home  DyslipidemiaCAD -- pt is taking crestor-- no side effects; will do labs early 12/2024 and needs refill of med  Migraines -- stable; cpm  Rosacea --  per derm and not seen her for some time  Thyroid nodules -- stable per Dr. Cortez  Eczema -- stable -- per derm  Back pain -- doing well; would like to go back to the gym  Gyne -- Dr. Hess  Last eye exam -- 12/12/23    Meds reviewed.  Current Outpatient Medications   Medication Sig Dispense Refill    ALENDRONATE 70 MG Oral Tab TAKE 1 TABLET (70 MG TOTAL) BY MOUTH EVERY 7 DAYS 12 tablet 1    SULFACLEANSE 8/4 8-4 % External Suspension Apply 1 Application  topically daily. 473 mL 3    doxycycline (VIBRAMYCIN) 100 MG Oral Cap Take one capsule twice a day for 3 months 60 capsule 2    clobetasol 0.05 % External Solution Apply to the scalp daily 50 mL 3    rosuvastatin 20 MG Oral Tab TAKE 1 TABLET BY MOUTH EVERY  NIGHT 90 tablet 1    lisinopril 10 MG Oral Tab TAKE 1 TABLET BY MOUTH DAILY 90 tablet 1    pantoprazole 20 MG Oral Tab EC Take 1 tablet (20 mg total) by mouth Before Dinner. 90 tablet 3    Ciclopirox 8 % External Solution Apply 1 Application topically nightly. 6 mL 5    Adapalene 0.3 % External Gel Differen OTC      Vitamin D3 2000 units Oral Cap Take 1 capsule (2,000 Units total) by mouth daily.      Calcium Carbonate 600 MG Oral Tab Take 1 tablet (600 mg total) by mouth as needed with dialysis for Heartburn.      Multiple Vitamins-Minerals (ONE-A-DAY WOMENS 50 PLUS OR)  Take  by mouth.       No current facility-administered medications for this visit.      Allergies[1]   Past Medical History:    Abdominal hernia    Constipation    occasionally    Diarrhea, unspecified    occasionally    Flatulence/gas pain/belching    It seems to be worse lately.    Heartburn    Hemorrhoids    High cholesterol    Hypercholesterolemia    Indigestion    Irregular bowel habits    Nausea    Night sweats    Other psoriasis    Painful swallowing    Problems with swallowing    Rosacea    Skin blushing/flushing    Unspecified essential hypertension    Vomiting    not stomach contents    Wears glasses      Social History:  Social History     Socioeconomic History    Marital status:    Tobacco Use    Smoking status: Never    Smokeless tobacco: Never   Vaping Use    Vaping status: Never Used   Substance and Sexual Activity    Alcohol use: Yes     Alcohol/week: 3.0 standard drinks of alcohol    Drug use: No    Sexual activity: Yes     Partners: Male     Comment: 9/16/2021   Other Topics Concern    Caffeine Concern Yes     Comment: 2 a day    Exercise Yes     Comment: walks        Results for orders placed or performed in visit on 08/02/24   Rheumatoid Arthritis Factor    Collection Time: 08/02/24 12:47 PM   Result Value Ref Range    Rheumatoid Factor <3.5 <14.0 IU/mL   Uric Acid, Serum    Collection Time: 08/02/24 12:47 PM   Result Value Ref Range    Uric Acid 3.3 3.1 - 7.8 mg/dL   Cyclic Citrullinate Peptide (CCP) antibodies    Collection Time: 08/02/24 12:47 PM   Result Value Ref Range    C-Citrullinated Peptide IgG AB 1.1 0.0 - 6.9 U/mL   Sed Rate, Edith (Automated)    Collection Time: 08/02/24 12:47 PM   Result Value Ref Range    Sed Rate 47 (H) 0 - 30 mm/Hr   C-Reactive Protein    Collection Time: 08/02/24 12:47 PM   Result Value Ref Range    C-Reactive Protein <0.40 <=0.50 mg/dL   Anti-Nuclear Antibody (NISREEN) by IFA, Reflex Titer + Specific Antibodies    Collection Time: 08/02/24 12:47 PM    Result Value Ref Range    NISREEN Screen With Reflex Positive (A) Negative   dsDNA Antibody by IFA    Collection Time: 08/02/24 12:47 PM   Result Value Ref Range    Anti Double Strand DNA <10 <10   NISREEN Titer/Pattern    Collection Time: 08/02/24 12:47 PM   Result Value Ref Range    NISREEN Titer/Pattern 320 (A) <80    Reviewed By: Vel Elmore M.D.    NISREEN Specific Antibodies, Reflex Group    Collection Time: 08/02/24 12:47 PM   Result Value Ref Range    Anti-SSA Antibody, IGG <0.4 <7 U/mL    Anti-SSB Antibody, IGG <0.4 <7 U/mL    Anti-Smith Antibody, IGG <0.7 <7 U/mL    Anti-U1RNP Antibody, IGG 1.0 <5 U/mL    Anti-RNP70 Antibody, IGG 0.4 <7 U/mL    Anti-Centromere Antibody, IGG <0.4 <7 U/mL    Anti-SCL70 Antibody, IGG 1.0 <7 U/mL    Anti-Yaritza-1 Antibody, IGG <0.3 <7 U/mL   CBC W/ DIFFERENTIAL    Collection Time: 08/02/24 12:47 PM   Result Value Ref Range    WBC 5.7 4.0 - 11.0 x10(3) uL    RBC 4.16 3.80 - 5.30 x10(6)uL    HGB 12.7 12.0 - 16.0 g/dL    HCT 37.9 35.0 - 48.0 %    .0 150.0 - 450.0 10(3)uL    MCV 91.1 80.0 - 100.0 fL    MCH 30.5 26.0 - 34.0 pg    MCHC 33.5 31.0 - 37.0 g/dL    RDW 13.8 %    Neutrophil Absolute Prelim 3.83 1.50 - 7.70 x10 (3) uL    Neutrophil Absolute 3.83 1.50 - 7.70 x10(3) uL    Lymphocyte Absolute 1.25 1.00 - 4.00 x10(3) uL    Monocyte Absolute 0.44 0.10 - 1.00 x10(3) uL    Eosinophil Absolute 0.12 0.00 - 0.70 x10(3) uL    Basophil Absolute 0.03 0.00 - 0.20 x10(3) uL    Immature Granulocyte Absolute 0.02 0.00 - 1.00 x10(3) uL    Neutrophil % 67.3 %    Lymphocyte % 22.0 %    Monocyte % 7.7 %    Eosinophil % 2.1 %    Basophil % 0.5 %    Immature Granulocyte % 0.4 %       REVIEW OF SYSTEMS:   GENERAL: feels well otherwise  SKIN: denies any unusual skin lesions  LUNGS: denies shortness of breath with exertion  CARDIOVASCULAR: denies chest pain on exertion  GI: denies abdominal pain,denies heartburn  MUSCULOSKELETAL: denies back pain  EXTREMITIES:  No pain or numbness    EXAM:   There were no  vitals taken for this visit.  Unable to assess vitals during video visit    GENERAL: AAO x 3; pleasant; NAD; well developed; well nourished    ASSESSMENT AND PLAN:     Encounter Diagnoses   Name Primary?    Essential hypertension, benign Yes    Dyslipidemia     Coronary artery disease involving native coronary artery of native heart without angina pectoris     Bilateral carotid artery stenosis     Aortic ectasia, thoracic (HCC)     Vitamin D deficiency     PVC (premature ventricular contraction)     Mitral valve disorder     Osteopenia of multiple sites     Migraine without aura and without status migrainosus, not intractable     Stage 3a chronic kidney disease (HCC)     Gastroesophageal reflux disease, unspecified whether esophagitis present     Rosacea     Multiple thyroid nodules     Medication management     Vaccine counseling        No orders of the defined types were placed in this encounter.      Meds & Refills for this Visit:  Requested Prescriptions     Signed Prescriptions Disp Refills    rosuvastatin 20 MG Oral Tab 90 tablet 1     Sig: Take 1 tablet (20 mg total) by mouth nightly.       Imaging & Consults:  None    HTN -- pt is taking lisinopril -- no side effects; does not check BP at home  DyslipidemiaCAD -- pt is taking crestor-- no side effects  Migraines -- stable; cpm  Rosacea --  per derm and not seen her for some time  Thyroid nodules -- stable per Dr. Cortez  Eczema -- stable -- per derm  Back pain -- doing well; would like to go back to the gym  Gyne -- Dr. Hess  Last eye exam -- 12/12/23  Dexa UTD.    The patient indicates understanding of these issues and agrees to the plan.    Please note that the following visit was completed using 2 way real-time interactive video communication.  This has been done in good mita to provide continuity of care in the best interest of the provider-patient relationship, due to ongoing public health crises/national emergency and because of restriction of  visitation.  There are limitations of this visit as no physical exam could be performed.  Every conscious effort was taken to allow for sufficient and adequate time.  The billing was spent on reviewing labs, medications, radiology tests, and decision making.  Appropriate medical decision making and tests are ordered as detailed in the plan of care above.  Return in about 6 weeks (around 12/9/2024) for physical.        [1] No Known Allergies

## 2024-11-26 ENCOUNTER — LAB ENCOUNTER (OUTPATIENT)
Dept: LAB | Age: 64
End: 2024-11-26
Attending: FAMILY MEDICINE
Payer: COMMERCIAL

## 2024-11-26 DIAGNOSIS — Z00.00 LABORATORY EXAMINATION ORDERED AS PART OF A ROUTINE GENERAL MEDICAL EXAMINATION: ICD-10-CM

## 2024-11-26 DIAGNOSIS — E55.9 VITAMIN D DEFICIENCY: ICD-10-CM

## 2024-11-26 LAB
ALBUMIN SERPL-MCNC: 4.7 G/DL (ref 3.2–4.8)
ALBUMIN/GLOB SERPL: 1.9 {RATIO} (ref 1–2)
ALP LIVER SERPL-CCNC: 53 U/L
ALT SERPL-CCNC: 20 U/L
ANION GAP SERPL CALC-SCNC: 9 MMOL/L (ref 0–18)
AST SERPL-CCNC: 32 U/L (ref ?–34)
BASOPHILS # BLD AUTO: 0.04 X10(3) UL (ref 0–0.2)
BASOPHILS NFR BLD AUTO: 0.9 %
BILIRUB SERPL-MCNC: 0.6 MG/DL (ref 0.2–1.1)
BUN BLD-MCNC: 9 MG/DL (ref 9–23)
BUN/CREAT SERPL: 9.3 (ref 10–20)
CALCIUM BLD-MCNC: 9.5 MG/DL (ref 8.7–10.4)
CHLORIDE SERPL-SCNC: 105 MMOL/L (ref 98–112)
CHOLEST SERPL-MCNC: 328 MG/DL (ref ?–200)
CO2 SERPL-SCNC: 25 MMOL/L (ref 21–32)
CREAT BLD-MCNC: 0.97 MG/DL
DEPRECATED RDW RBC AUTO: 50.2 FL (ref 35.1–46.3)
EGFRCR SERPLBLD CKD-EPI 2021: 66 ML/MIN/1.73M2 (ref 60–?)
EOSINOPHIL # BLD AUTO: 0.09 X10(3) UL (ref 0–0.7)
EOSINOPHIL NFR BLD AUTO: 2 %
ERYTHROCYTE [DISTWIDTH] IN BLOOD BY AUTOMATED COUNT: 14.6 % (ref 11–15)
FASTING PATIENT LIPID ANSWER: YES
FASTING STATUS PATIENT QL REPORTED: YES
GLOBULIN PLAS-MCNC: 2.5 G/DL (ref 2–3.5)
GLUCOSE BLD-MCNC: 91 MG/DL (ref 70–99)
HCT VFR BLD AUTO: 35.4 %
HDLC SERPL-MCNC: 68 MG/DL (ref 40–59)
HGB BLD-MCNC: 11.5 G/DL
IMM GRANULOCYTES # BLD AUTO: 0.02 X10(3) UL (ref 0–1)
IMM GRANULOCYTES NFR BLD: 0.4 %
LDLC SERPL CALC-MCNC: 232 MG/DL (ref ?–100)
LYMPHOCYTES # BLD AUTO: 1.29 X10(3) UL (ref 1–4)
LYMPHOCYTES NFR BLD AUTO: 28.1 %
MCH RBC QN AUTO: 30.2 PG (ref 26–34)
MCHC RBC AUTO-ENTMCNC: 32.5 G/DL (ref 31–37)
MCV RBC AUTO: 92.9 FL
MONOCYTES # BLD AUTO: 0.43 X10(3) UL (ref 0.1–1)
MONOCYTES NFR BLD AUTO: 9.4 %
NEUTROPHILS # BLD AUTO: 2.72 X10 (3) UL (ref 1.5–7.7)
NEUTROPHILS # BLD AUTO: 2.72 X10(3) UL (ref 1.5–7.7)
NEUTROPHILS NFR BLD AUTO: 59.2 %
NONHDLC SERPL-MCNC: 260 MG/DL (ref ?–130)
OSMOLALITY SERPL CALC.SUM OF ELEC: 286 MOSM/KG (ref 275–295)
PLATELET # BLD AUTO: 357 10(3)UL (ref 150–450)
POTASSIUM SERPL-SCNC: 4.1 MMOL/L (ref 3.5–5.1)
PROT SERPL-MCNC: 7.2 G/DL (ref 5.7–8.2)
RBC # BLD AUTO: 3.81 X10(6)UL
SODIUM SERPL-SCNC: 139 MMOL/L (ref 136–145)
TRIGL SERPL-MCNC: 153 MG/DL (ref 30–149)
TSI SER-ACNC: 2.03 UIU/ML (ref 0.55–4.78)
VIT D+METAB SERPL-MCNC: 65 NG/ML (ref 30–100)
VLDLC SERPL CALC-MCNC: 35 MG/DL (ref 0–30)
WBC # BLD AUTO: 4.6 X10(3) UL (ref 4–11)

## 2024-11-26 PROCEDURE — 80053 COMPREHEN METABOLIC PANEL: CPT

## 2024-11-26 PROCEDURE — 36415 COLL VENOUS BLD VENIPUNCTURE: CPT

## 2024-11-26 PROCEDURE — 84443 ASSAY THYROID STIM HORMONE: CPT

## 2024-11-26 PROCEDURE — 85025 COMPLETE CBC W/AUTO DIFF WBC: CPT

## 2024-11-26 PROCEDURE — 82306 VITAMIN D 25 HYDROXY: CPT

## 2024-11-26 PROCEDURE — 80061 LIPID PANEL: CPT

## 2024-12-02 ENCOUNTER — OFFICE VISIT (OUTPATIENT)
Dept: FAMILY MEDICINE CLINIC | Facility: CLINIC | Age: 64
End: 2024-12-02
Payer: COMMERCIAL

## 2024-12-02 VITALS
OXYGEN SATURATION: 100 % | RESPIRATION RATE: 18 BRPM | BODY MASS INDEX: 27.74 KG/M2 | HEART RATE: 86 BPM | SYSTOLIC BLOOD PRESSURE: 122 MMHG | DIASTOLIC BLOOD PRESSURE: 74 MMHG | WEIGHT: 187.25 LBS | HEIGHT: 69 IN

## 2024-12-02 DIAGNOSIS — Z00.00 LABORATORY EXAMINATION ORDERED AS PART OF A ROUTINE GENERAL MEDICAL EXAMINATION: ICD-10-CM

## 2024-12-02 DIAGNOSIS — Z12.31 SCREENING MAMMOGRAM FOR BREAST CANCER: ICD-10-CM

## 2024-12-02 DIAGNOSIS — E78.5 DYSLIPIDEMIA: ICD-10-CM

## 2024-12-02 DIAGNOSIS — I10 ESSENTIAL HYPERTENSION, BENIGN: ICD-10-CM

## 2024-12-02 DIAGNOSIS — M85.89 OSTEOPENIA OF MULTIPLE SITES: ICD-10-CM

## 2024-12-02 DIAGNOSIS — D64.9 ANEMIA, UNSPECIFIED TYPE: ICD-10-CM

## 2024-12-02 DIAGNOSIS — Z00.00 ROUTINE GENERAL MEDICAL EXAMINATION AT A HEALTH CARE FACILITY: Primary | ICD-10-CM

## 2024-12-02 DIAGNOSIS — Z13.89 SCREENING FOR GENITOURINARY CONDITION: ICD-10-CM

## 2024-12-02 LAB
BILIRUB UR QL STRIP.AUTO: NEGATIVE
CLARITY UR REFRACT.AUTO: CLEAR
COLOR UR AUTO: COLORLESS
GLUCOSE UR STRIP.AUTO-MCNC: NORMAL MG/DL
KETONES UR STRIP.AUTO-MCNC: NEGATIVE MG/DL
LEUKOCYTE ESTERASE UR QL STRIP.AUTO: NEGATIVE
NITRITE UR QL STRIP.AUTO: NEGATIVE
PH UR STRIP.AUTO: 6 [PH] (ref 5–8)
PROT UR STRIP.AUTO-MCNC: NEGATIVE MG/DL
RBC UR QL AUTO: NEGATIVE
SP GR UR STRIP.AUTO: 1 (ref 1–1.03)
UROBILINOGEN UR STRIP.AUTO-MCNC: NORMAL MG/DL

## 2024-12-02 PROCEDURE — 99396 PREV VISIT EST AGE 40-64: CPT | Performed by: FAMILY MEDICINE

## 2024-12-02 PROCEDURE — 81003 URINALYSIS AUTO W/O SCOPE: CPT | Performed by: FAMILY MEDICINE

## 2024-12-02 RX ORDER — PANTOPRAZOLE SODIUM 20 MG/1
20 TABLET, DELAYED RELEASE ORAL
Qty: 90 TABLET | Refills: 1 | Status: SHIPPED | OUTPATIENT
Start: 2024-12-02

## 2024-12-02 RX ORDER — LISINOPRIL 10 MG/1
10 TABLET ORAL DAILY
Qty: 90 TABLET | Refills: 1 | Status: SHIPPED | OUTPATIENT
Start: 2024-12-02 | End: 2025-11-27

## 2024-12-02 RX ORDER — ALENDRONATE SODIUM 70 MG/1
70 TABLET ORAL
Qty: 12 TABLET | Refills: 1 | Status: SHIPPED | OUTPATIENT
Start: 2024-12-02

## 2024-12-02 NOTE — H&P
CC: Annual Physical Exam    HPI:   Carlotta Vargas is a 64 year old female who presents for a complete physical exam. Symptoms: is menopausal. Patient complains of stopping rosovastatin and LDL shot up.          Wt Readings from Last 6 Encounters:   12/02/24 187 lb 4 oz (84.9 kg)   08/02/24 185 lb (83.9 kg)   07/10/24 181 lb 4 oz (82.2 kg)   04/10/24 179 lb 6 oz (81.4 kg)   01/09/24 182 lb (82.6 kg)   11/02/23 180 lb (81.6 kg)     Body mass index is 27.65 kg/m².     Results for orders placed or performed in visit on 11/26/24   CBC With Differential With Platelet    Collection Time: 11/26/24 11:04 AM   Result Value Ref Range    WBC 4.6 4.0 - 11.0 x10(3) uL    RBC 3.81 3.80 - 5.30 x10(6)uL    HGB 11.5 (L) 12.0 - 16.0 g/dL    HCT 35.4 35.0 - 48.0 %    MCV 92.9 80.0 - 100.0 fL    MCH 30.2 26.0 - 34.0 pg    MCHC 32.5 31.0 - 37.0 g/dL    RDW-SD 50.2 (H) 35.1 - 46.3 fL    RDW 14.6 11.0 - 15.0 %    .0 150.0 - 450.0 10(3)uL    Neutrophil Absolute Prelim 2.72 1.50 - 7.70 x10 (3) uL    Neutrophil Absolute 2.72 1.50 - 7.70 x10(3) uL    Lymphocyte Absolute 1.29 1.00 - 4.00 x10(3) uL    Monocyte Absolute 0.43 0.10 - 1.00 x10(3) uL    Eosinophil Absolute 0.09 0.00 - 0.70 x10(3) uL    Basophil Absolute 0.04 0.00 - 0.20 x10(3) uL    Immature Granulocyte Absolute 0.02 0.00 - 1.00 x10(3) uL    Neutrophil % 59.2 %    Lymphocyte % 28.1 %    Monocyte % 9.4 %    Eosinophil % 2.0 %    Basophil % 0.9 %    Immature Granulocyte % 0.4 %   Comp Metabolic Panel (14)    Collection Time: 11/26/24 11:04 AM   Result Value Ref Range    Glucose 91 70 - 99 mg/dL    Sodium 139 136 - 145 mmol/L    Potassium 4.1 3.5 - 5.1 mmol/L    Chloride 105 98 - 112 mmol/L    CO2 25.0 21.0 - 32.0 mmol/L    Anion Gap 9 0 - 18 mmol/L    BUN 9 9 - 23 mg/dL    Creatinine 0.97 0.55 - 1.02 mg/dL    BUN/CREA Ratio 9.3 (L) 10.0 - 20.0    Calcium, Total 9.5 8.7 - 10.4 mg/dL    Calculated Osmolality 286 275 - 295 mOsm/kg    eGFR-Cr 66 >=60 mL/min/1.73m2    ALT 20  10 - 49 U/L    AST 32 <34 U/L    Alkaline Phosphatase 53 50 - 130 U/L    Bilirubin, Total 0.6 0.2 - 1.1 mg/dL    Total Protein 7.2 5.7 - 8.2 g/dL    Albumin 4.7 3.2 - 4.8 g/dL    Globulin  2.5 2.0 - 3.5 g/dL    A/G Ratio 1.9 1.0 - 2.0    Patient Fasting for CMP? Yes    TSH W Reflex To Free T4    Collection Time: 11/26/24 11:04 AM   Result Value Ref Range    TSH 2.027 0.550 - 4.780 uIU/mL   Lipid Panel    Collection Time: 11/26/24 11:04 AM   Result Value Ref Range    Cholesterol, Total 328 (H) <200 mg/dL    HDL Cholesterol 68 (H) 40 - 59 mg/dL    Triglycerides 153 (H) 30 - 149 mg/dL    LDL Cholesterol 232 (H) <100 mg/dL    VLDL 35 (H) 0 - 30 mg/dL    Non HDL Chol 260 (H) <130 mg/dL    Patient Fasting for Lipid? Yes    Vitamin D, 25-Hydroxy    Collection Time: 11/26/24 11:04 AM   Result Value Ref Range    Vitamin D, 25OH, Total 65.0 30.0 - 100.0 ng/mL       Current Outpatient Medications   Medication Sig Dispense Refill    alendronate 70 MG Oral Tab Take 1 tablet (70 mg total) by mouth every 7 days. 12 tablet 1    pantoprazole 20 MG Oral Tab EC Take 1 tablet (20 mg total) by mouth every morning before breakfast. 90 tablet 1    lisinopril 10 MG Oral Tab Take 1 tablet (10 mg total) by mouth daily. 90 tablet 1    rosuvastatin 20 MG Oral Tab Take 1 tablet (20 mg total) by mouth nightly. 90 tablet 1    SULFACLEANSE 8/4 8-4 % External Suspension Apply 1 Application  topically daily. 473 mL 3    doxycycline (VIBRAMYCIN) 100 MG Oral Cap Take one capsule twice a day for 3 months 60 capsule 2    clobetasol 0.05 % External Solution Apply to the scalp daily 50 mL 3    Adapalene 0.3 % External Gel Differen OTC      Vitamin D3 2000 units Oral Cap Take 1 capsule (2,000 Units total) by mouth daily.      Calcium Carbonate 600 MG Oral Tab Take 1 tablet (600 mg total) by mouth as needed with dialysis for Heartburn.      Multiple Vitamins-Minerals (ONE-A-DAY WOMENS 50 PLUS OR) Take by mouth. Centrum silver        No Known Allergies    Past Medical History:    Abdominal hernia    Constipation    occasionally    Diarrhea, unspecified    occasionally    Flatulence/gas pain/belching    It seems to be worse lately.    Heartburn    Hemorrhoids    High cholesterol    Hypercholesterolemia    Indigestion    Irregular bowel habits    Nausea    Night sweats    Other psoriasis    Painful swallowing    Problems with swallowing    Rosacea    Skin blushing/flushing    Unspecified essential hypertension    Vomiting    not stomach contents    Wears glasses      Past Surgical History:   Procedure Laterality Date    Colonoscopy  2005, 2011    D & c  1985    Egd  12/1/2015    Endometrial ablation  2006    Thermal endometrial ablation    Hernia surgery      Inguinal hernia repair  1986    Other surgical history  1994    In Vitro Fertilization    Other surgical history  1996    ruptured ectopic    Tonsillectomy  1967      Family History   Problem Relation Age of Onset    Heart Disorder Maternal Grandmother         CHF, cardiomegaly    Diabetes Maternal Grandmother     Heart Disorder Maternal Grandfather         MI    Diabetes Maternal Grandfather     Other (alcoholic) Maternal Grandfather     Heart Disorder Paternal Grandmother     Diabetes Paternal Grandmother     Stroke Paternal Grandmother     Other (rheumatoid arthritis) Paternal Grandmother         RA    Heart Disorder Paternal Grandfather     Diabetes Paternal Grandfather     Other (alcoholic) Paternal Grandfather     Cancer Father 70        Non hodgkins Lymphoma    Heart Disorder Father         tachycardia    Hypertension Father     Prostate Cancer Father 60    Thyroid Disorder Father         Hyperthyroid    Arthritis Father         Psoriatic    Other (multiple myeloma) Father     Colon Polyps Father     Heart Disorder Mother         CABG x2    Hypertension Mother     Lipids Mother     Dementia Mother     Lipids Sister     Thyroid Disorder Sister         Hyperthyroid      Social History:   Social History      Socioeconomic History    Marital status:    Tobacco Use    Smoking status: Never    Smokeless tobacco: Never   Vaping Use    Vaping status: Never Used   Substance and Sexual Activity    Alcohol use: Yes     Alcohol/week: 3.0 standard drinks of alcohol    Drug use: No    Sexual activity: Yes     Partners: Male     Comment: 9/16/2021   Other Topics Concern    Caffeine Concern Yes     Comment: 2 a day    Exercise Yes     Comment: walks     Occ: retired. : y. Children: 2.   Exercise: three times per week.  Diet: watches fats closely, watches sugar closely and watches calories closely     REVIEW OF SYSTEMS:   GENERAL: feels well otherwise  SKIN: denies any unusual skin lesions  EYES:denies blurred vision or double vision  HEENT: denies nasal congestion, sinus pain or ST  LUNGS: denies shortness of breath with exertion  CARDIOVASCULAR: denies chest pain on exertion  GI: denies abdominal pain,denies heartburn  : denies dysuria, vaginal discharge or itching, in menopause   MUSCULOSKELETAL: denies back pain; left arm pain  NEURO: denies headaches  PSYCHE: denies depression or anxiety  HEMATOLOGIC: denies hx of anemia  ENDOCRINE:  thyroid history  ALL/ASTHMA: denies hx of  Asthma; allergies    EXAM:   /74 (BP Location: Left arm, Patient Position: Sitting, Cuff Size: adult)   Pulse 86   Resp 18   Ht 5' 9\" (1.753 m)   Wt 187 lb 4 oz (84.9 kg)   SpO2 100%   BMI 27.65 kg/m²   Body mass index is 27.65 kg/m².   GENERAL: well developed, well nourished,in no apparent distress  SKIN: no rashes,no suspicious lesions; lesion on top of head -- non healing for the past 1 month  HEENT: atraumatic, normocephalic,ears and throat clear  EYES: EOMI, conjunctiva are clear  NECK: supple,no adenopathy,no bruits, thyroid nodules  CHEST: no chest tenderness  BREAST: DEFERRED TO GYNE  LUNGS: clear to auscultation  CARDIO: RRR without murmur  GI: good BS's,no masses, HSM or tenderness  : DEFERRED TO  GYNE  MUSCULOSKELETAL: back is not tender,FROM of the back; pain with abduction on the left; otherwise ROM wnl  EXTREMITIES: no cyanosis, clubbing or edema  NEURO: Oriented times three,cranial nerves are intact,motor and sensory are grossly intact  VASCULAR: 2 + dorsalis pedal pulses bilaterally    ASSESSMENT AND PLAN:   Carlotta Vargas is a 64 year old female who presents for a complete physical exam.   Pap and pelvic NOT done.   Self breast exam explained.   Health maintenance, will check:   Orders Placed This Encounter   Procedures    Comp Metabolic Panel (14)    Lipid Panel    Iron And Tibc    Ferritin    CBC With Differential With Platelet       Dr. Hess for OB/gyne.  Mammo per Jimena Underwood.  Colonoscopy due in 2031 per Dr. DURANT.  Had COVID and flu.  Labs reviewed.  Advised cardiology for eval.   Advised to see Dr. Ling for lesion on top of left scalp.  He advised plaquenil for her hair -- Dx -- alopecia  Wanted her to see ophthalmology prior to starting drug.       Last eye exam -- 2/2024  Last dental exam -- 9/2024    HTN -- on lisinopril 10 mg daily  Osetopenia - on alendronate 70 mg daily      Pt' s weight is Body mass index is 27.65 kg/m²., recommended low fat diet and aerobic exercise 30 minutes three times weekly.    The patient indicates understanding of these issues and agrees to the plan.  The patient is asked to return in Return in about 3 months (around 3/2/2025) for med check 30.

## 2024-12-06 ENCOUNTER — PATIENT MESSAGE (OUTPATIENT)
Dept: FAMILY MEDICINE CLINIC | Facility: CLINIC | Age: 64
End: 2024-12-06

## 2024-12-09 RX ORDER — PANTOPRAZOLE SODIUM 20 MG/1
20 TABLET, DELAYED RELEASE ORAL
Qty: 90 TABLET | Refills: 1 | Status: SHIPPED | OUTPATIENT
Start: 2024-12-09

## 2025-02-13 ENCOUNTER — TELEPHONE (OUTPATIENT)
Facility: LOCATION | Age: 65
End: 2025-02-13

## 2025-02-13 DIAGNOSIS — E04.2 NONTOXIC MULTINODULAR GOITER: Primary | ICD-10-CM

## 2025-03-03 ENCOUNTER — LAB ENCOUNTER (OUTPATIENT)
Dept: LAB | Age: 65
End: 2025-03-03
Attending: FAMILY MEDICINE
Payer: COMMERCIAL

## 2025-03-03 DIAGNOSIS — E78.5 DYSLIPIDEMIA: ICD-10-CM

## 2025-03-03 DIAGNOSIS — I10 ESSENTIAL HYPERTENSION, BENIGN: ICD-10-CM

## 2025-03-03 DIAGNOSIS — D64.9 ANEMIA, UNSPECIFIED TYPE: ICD-10-CM

## 2025-03-03 LAB
ALBUMIN SERPL-MCNC: 4.8 G/DL (ref 3.2–4.8)
ALBUMIN/GLOB SERPL: 1.7 {RATIO} (ref 1–2)
ALP LIVER SERPL-CCNC: 55 U/L
ALT SERPL-CCNC: 22 U/L
ANION GAP SERPL CALC-SCNC: 7 MMOL/L (ref 0–18)
AST SERPL-CCNC: 32 U/L (ref ?–34)
BASOPHILS # BLD AUTO: 0.02 X10(3) UL (ref 0–0.2)
BASOPHILS NFR BLD AUTO: 0.4 %
BILIRUB SERPL-MCNC: 0.4 MG/DL (ref 0.2–1.1)
BUN BLD-MCNC: 14 MG/DL (ref 9–23)
BUN/CREAT SERPL: 13.9 (ref 10–20)
CALCIUM BLD-MCNC: 9.5 MG/DL (ref 8.7–10.4)
CHLORIDE SERPL-SCNC: 103 MMOL/L (ref 98–112)
CHOLEST SERPL-MCNC: 219 MG/DL (ref ?–200)
CO2 SERPL-SCNC: 29 MMOL/L (ref 21–32)
CREAT BLD-MCNC: 1.01 MG/DL
DEPRECATED HBV CORE AB SER IA-ACNC: 14 NG/ML
DEPRECATED RDW RBC AUTO: 44.7 FL (ref 35.1–46.3)
EGFRCR SERPLBLD CKD-EPI 2021: 63 ML/MIN/1.73M2 (ref 60–?)
EOSINOPHIL # BLD AUTO: 0.1 X10(3) UL (ref 0–0.7)
EOSINOPHIL NFR BLD AUTO: 2.2 %
ERYTHROCYTE [DISTWIDTH] IN BLOOD BY AUTOMATED COUNT: 13.2 % (ref 11–15)
FASTING PATIENT LIPID ANSWER: YES
FASTING STATUS PATIENT QL REPORTED: YES
GLOBULIN PLAS-MCNC: 2.8 G/DL (ref 2–3.5)
GLUCOSE BLD-MCNC: 94 MG/DL (ref 70–99)
HCT VFR BLD AUTO: 41.2 %
HDLC SERPL-MCNC: 63 MG/DL (ref 40–59)
HGB BLD-MCNC: 13.8 G/DL
IMM GRANULOCYTES # BLD AUTO: 0 X10(3) UL (ref 0–1)
IMM GRANULOCYTES NFR BLD: 0 %
IRON SATN MFR SERPL: 23 %
IRON SERPL-MCNC: 83 UG/DL
LDLC SERPL CALC-MCNC: 134 MG/DL (ref ?–100)
LYMPHOCYTES # BLD AUTO: 1.39 X10(3) UL (ref 1–4)
LYMPHOCYTES NFR BLD AUTO: 30.8 %
MCH RBC QN AUTO: 30.7 PG (ref 26–34)
MCHC RBC AUTO-ENTMCNC: 33.5 G/DL (ref 31–37)
MCV RBC AUTO: 91.6 FL
MONOCYTES # BLD AUTO: 0.35 X10(3) UL (ref 0.1–1)
MONOCYTES NFR BLD AUTO: 7.8 %
NEUTROPHILS # BLD AUTO: 2.65 X10 (3) UL (ref 1.5–7.7)
NEUTROPHILS # BLD AUTO: 2.65 X10(3) UL (ref 1.5–7.7)
NEUTROPHILS NFR BLD AUTO: 58.8 %
NONHDLC SERPL-MCNC: 156 MG/DL (ref ?–130)
OSMOLALITY SERPL CALC.SUM OF ELEC: 288 MOSM/KG (ref 275–295)
PLATELET # BLD AUTO: 365 10(3)UL (ref 150–450)
POTASSIUM SERPL-SCNC: 4.1 MMOL/L (ref 3.5–5.1)
PROT SERPL-MCNC: 7.6 G/DL (ref 5.7–8.2)
RBC # BLD AUTO: 4.5 X10(6)UL
SODIUM SERPL-SCNC: 139 MMOL/L (ref 136–145)
TOTAL IRON BINDING CAPACITY: 359 UG/DL (ref 250–425)
TRANSFERRIN SERPL-MCNC: 291 MG/DL (ref 250–380)
TRIGL SERPL-MCNC: 127 MG/DL (ref 30–149)
VLDLC SERPL CALC-MCNC: 23 MG/DL (ref 0–30)
WBC # BLD AUTO: 4.5 X10(3) UL (ref 4–11)

## 2025-03-03 PROCEDURE — 84466 ASSAY OF TRANSFERRIN: CPT

## 2025-03-03 PROCEDURE — 36415 COLL VENOUS BLD VENIPUNCTURE: CPT

## 2025-03-03 PROCEDURE — 82728 ASSAY OF FERRITIN: CPT

## 2025-03-03 PROCEDURE — 80061 LIPID PANEL: CPT

## 2025-03-03 PROCEDURE — 83540 ASSAY OF IRON: CPT

## 2025-03-03 PROCEDURE — 85025 COMPLETE CBC W/AUTO DIFF WBC: CPT

## 2025-03-03 PROCEDURE — 80053 COMPREHEN METABOLIC PANEL: CPT

## 2025-03-04 ENCOUNTER — OFFICE VISIT (OUTPATIENT)
Dept: FAMILY MEDICINE CLINIC | Facility: CLINIC | Age: 65
End: 2025-03-04
Payer: COMMERCIAL

## 2025-03-04 VITALS
HEIGHT: 69 IN | RESPIRATION RATE: 16 BRPM | SYSTOLIC BLOOD PRESSURE: 122 MMHG | HEART RATE: 91 BPM | DIASTOLIC BLOOD PRESSURE: 72 MMHG | WEIGHT: 174.25 LBS | OXYGEN SATURATION: 99 % | BODY MASS INDEX: 25.81 KG/M2

## 2025-03-04 DIAGNOSIS — Z79.899 MEDICATION MANAGEMENT: ICD-10-CM

## 2025-03-04 DIAGNOSIS — K21.9 GASTROESOPHAGEAL REFLUX DISEASE, UNSPECIFIED WHETHER ESOPHAGITIS PRESENT: ICD-10-CM

## 2025-03-04 DIAGNOSIS — I10 ESSENTIAL HYPERTENSION, BENIGN: ICD-10-CM

## 2025-03-04 DIAGNOSIS — R91.8 OPACITY OF LUNG ON IMAGING STUDY: ICD-10-CM

## 2025-03-04 DIAGNOSIS — E04.2 MULTIPLE THYROID NODULES: ICD-10-CM

## 2025-03-04 DIAGNOSIS — L71.9 ROSACEA: ICD-10-CM

## 2025-03-04 DIAGNOSIS — E78.5 DYSLIPIDEMIA: Primary | ICD-10-CM

## 2025-03-04 DIAGNOSIS — M85.89 OSTEOPENIA OF MULTIPLE SITES: ICD-10-CM

## 2025-03-04 DIAGNOSIS — G43.009 MIGRAINE WITHOUT AURA AND WITHOUT STATUS MIGRAINOSUS, NOT INTRACTABLE: ICD-10-CM

## 2025-03-04 DIAGNOSIS — I77.810 AORTIC ECTASIA, THORACIC: ICD-10-CM

## 2025-03-04 DIAGNOSIS — D50.8 IRON DEFICIENCY ANEMIA SECONDARY TO INADEQUATE DIETARY IRON INTAKE: ICD-10-CM

## 2025-03-04 DIAGNOSIS — I65.23 BILATERAL CAROTID ARTERY STENOSIS: ICD-10-CM

## 2025-03-04 DIAGNOSIS — L30.9 ECZEMA, UNSPECIFIED TYPE: ICD-10-CM

## 2025-03-04 DIAGNOSIS — I25.10 CORONARY ARTERY DISEASE INVOLVING NATIVE CORONARY ARTERY OF NATIVE HEART WITHOUT ANGINA PECTORIS: ICD-10-CM

## 2025-03-04 DIAGNOSIS — E55.9 VITAMIN D DEFICIENCY: ICD-10-CM

## 2025-03-04 DIAGNOSIS — Z71.85 VACCINE COUNSELING: ICD-10-CM

## 2025-03-04 PROCEDURE — 99214 OFFICE O/P EST MOD 30 MIN: CPT | Performed by: FAMILY MEDICINE

## 2025-03-04 RX ORDER — ROSUVASTATIN CALCIUM 40 MG/1
40 TABLET, COATED ORAL NIGHTLY
Qty: 90 TABLET | Refills: 0 | Status: SHIPPED | OUTPATIENT
Start: 2025-03-04

## 2025-03-04 NOTE — PROGRESS NOTES
Carlotta Vargas is a 64 year old female.  HPI:   Pt. Is here for a med check.    HTN -- pt is taking lisinopril -- no side effects; does not check BP at home  DyslipidemiaCAD -- pt is taking crestor-- no side effects; LDL improving  Migraines -- stable; cpm  Rosacea --  per derm and not seen her for some time  Thyroid nodules -- stable per Dr. Cortez  Eczema -- stable -- per derm  Gyne -- Dr. Hess  Last eye exam -- 2/2024      Meds reviewed.      Current Outpatient Medications   Medication Sig Dispense Refill    rosuvastatin 40 MG Oral Tab Take 1 tablet (40 mg total) by mouth nightly. 90 tablet 0    doxycycline (VIBRAMYCIN) 100 MG Oral Cap Take 1 capsule (100 mg total) by mouth 2 (two) times daily for 28 days. 56 capsule 0    metroNIDAZOLE 0.75 % External Cream Apply 1 Application topically 2 (two) times daily. 45 g 2    pantoprazole 20 MG Oral Tab EC Take 1 tablet (20 mg total) by mouth every morning before breakfast. 90 tablet 1    alendronate 70 MG Oral Tab Take 1 tablet (70 mg total) by mouth every 7 days. 12 tablet 1    lisinopril 10 MG Oral Tab Take 1 tablet (10 mg total) by mouth daily. 90 tablet 1    SULFACLEANSE 8/4 8-4 % External Suspension Apply 1 Application  topically daily. 473 mL 3    clobetasol 0.05 % External Solution Apply to the scalp daily 50 mL 3    Adapalene 0.3 % External Gel Differen OTC      Vitamin D3 2000 units Oral Cap Take 1 capsule (2,000 Units total) by mouth daily.      Calcium Carbonate 600 MG Oral Tab Take 1 tablet (600 mg total) by mouth as needed with dialysis for Heartburn.      Multiple Vitamins-Minerals (ONE-A-DAY WOMENS 50 PLUS OR) Take by mouth. Centrum silver      hydroxychloroquine (PLAQUENIL) 200 MG Oral Tab Take 1 tablet (200 mg total) by mouth 2 (two) times daily. (Patient not taking: Reported on 3/4/2025) 60 tablet 0      No Known Allergies   Past Medical History:    Abdominal hernia    Constipation    occasionally    Diarrhea, unspecified    occasionally     Flatulence/gas pain/belching    It seems to be worse lately.    Heartburn    Hemorrhoids    High cholesterol    Hypercholesterolemia    Indigestion    Irregular bowel habits    Nausea    Night sweats    Other psoriasis    Painful swallowing    Problems with swallowing    Rosacea    Skin blushing/flushing    Unspecified essential hypertension    Vomiting    not stomach contents    Wears glasses      Social History:  Social History     Socioeconomic History    Marital status:    Tobacco Use    Smoking status: Never    Smokeless tobacco: Never   Vaping Use    Vaping status: Never Used   Substance and Sexual Activity    Alcohol use: Yes     Alcohol/week: 3.0 standard drinks of alcohol    Drug use: No    Sexual activity: Yes     Partners: Male     Comment: 9/16/2021   Other Topics Concern    Caffeine Concern Yes     Comment: 2 a day    Exercise Yes     Comment: walks     Social Drivers of Health     Food Insecurity: No Food Insecurity (3/4/2025)    NCSS - Food Insecurity     Worried About Running Out of Food in the Last Year: No     Ran Out of Food in the Last Year: No   Transportation Needs: No Transportation Needs (3/4/2025)    NCSS - Transportation     Lack of Transportation: No   Housing Stability: Not At Risk (3/4/2025)    NCSS - Housing/Utilities     Has Housing: Yes     Worried About Losing Housing: No     Unable to Get Utilities: No        Results for orders placed or performed in visit on 03/03/25   Comp Metabolic Panel (14)    Collection Time: 03/03/25  7:54 AM   Result Value Ref Range    Glucose 94 70 - 99 mg/dL    Sodium 139 136 - 145 mmol/L    Potassium 4.1 3.5 - 5.1 mmol/L    Chloride 103 98 - 112 mmol/L    CO2 29.0 21.0 - 32.0 mmol/L    Anion Gap 7 0 - 18 mmol/L    BUN 14 9 - 23 mg/dL    Creatinine 1.01 0.55 - 1.02 mg/dL    BUN/CREA Ratio 13.9 10.0 - 20.0    Calcium, Total 9.5 8.7 - 10.4 mg/dL    Calculated Osmolality 288 275 - 295 mOsm/kg    eGFR-Cr 63 >=60 mL/min/1.73m2    ALT 22 10 - 49 U/L     AST 32 <34 U/L    Alkaline Phosphatase 55 50 - 130 U/L    Bilirubin, Total 0.4 0.2 - 1.1 mg/dL    Total Protein 7.6 5.7 - 8.2 g/dL    Albumin 4.8 3.2 - 4.8 g/dL    Globulin  2.8 2.0 - 3.5 g/dL    A/G Ratio 1.7 1.0 - 2.0    Patient Fasting for CMP? Yes    Lipid Panel    Collection Time: 03/03/25  7:54 AM   Result Value Ref Range    Cholesterol, Total 219 (H) <200 mg/dL    HDL Cholesterol 63 (H) 40 - 59 mg/dL    Triglycerides 127 30 - 149 mg/dL    LDL Cholesterol 134 (H) <100 mg/dL    VLDL 23 0 - 30 mg/dL    Non HDL Chol 156 (H) <130 mg/dL    Patient Fasting for Lipid? Yes    Iron And Tibc    Collection Time: 03/03/25  7:54 AM   Result Value Ref Range    Iron 83 50 - 170 ug/dL    Transferrin 291 250 - 380 mg/dL    Total Iron Binding Capacity 359 250 - 425 ug/dL    % Saturation 23 15 - 50 %   Ferritin    Collection Time: 03/03/25  7:54 AM   Result Value Ref Range    Ferritin 14 (L) 50 - 306 ng/mL   CBC With Differential With Platelet    Collection Time: 03/03/25  7:54 AM   Result Value Ref Range    WBC 4.5 4.0 - 11.0 x10(3) uL    RBC 4.50 3.80 - 5.30 x10(6)uL    HGB 13.8 12.0 - 16.0 g/dL    HCT 41.2 35.0 - 48.0 %    MCV 91.6 80.0 - 100.0 fL    MCH 30.7 26.0 - 34.0 pg    MCHC 33.5 31.0 - 37.0 g/dL    RDW-SD 44.7 35.1 - 46.3 fL    RDW 13.2 11.0 - 15.0 %    .0 150.0 - 450.0 10(3)uL    Neutrophil Absolute Prelim 2.65 1.50 - 7.70 x10 (3) uL    Neutrophil Absolute 2.65 1.50 - 7.70 x10(3) uL    Lymphocyte Absolute 1.39 1.00 - 4.00 x10(3) uL    Monocyte Absolute 0.35 0.10 - 1.00 x10(3) uL    Eosinophil Absolute 0.10 0.00 - 0.70 x10(3) uL    Basophil Absolute 0.02 0.00 - 0.20 x10(3) uL    Immature Granulocyte Absolute 0.00 0.00 - 1.00 x10(3) uL    Neutrophil % 58.8 %    Lymphocyte % 30.8 %    Monocyte % 7.8 %    Eosinophil % 2.2 %    Basophil % 0.4 %    Immature Granulocyte % 0.0 %       REVIEW OF SYSTEMS:   GENERAL: feels well otherwise  SKIN: eczema and rosacea; eyebrow thinning  EYES:denies blurred vision or double  vision  HEENT:  nasal congestion, sinus pain or ST  LUNGS: denies shortness of breath with exertion  CARDIOVASCULAR: denies chest pain on exertion  GI: denies abdominal pain,denies heartburn  : denies dysuria  MUSCULOSKELETAL: no back pain  NEURO: denies headaches  PSYCHE: denies depression or anxiety  HEMATOLOGIC:  hx of anemia  ENDOCRINE: denies thyroid history  ALL/ASTHMA: denies hx of allergy or asthma    EXAM:   /72 (BP Location: Left arm, Patient Position: Sitting, Cuff Size: adult)   Pulse 91   Resp 16   Ht 5' 9\" (1.753 m)   Wt 174 lb 4 oz (79 kg)   SpO2 99%   BMI 25.73 kg/m²   GENERAL: well developed, well nourished,in no apparent distress  PSYCHE: normal mood and affect  SKIN: no rashes,no suspicious lesions  NECK: supple,no adenopathy,no bruits, thyroid nodules  LUNGS: clear to auscultation  CARDIO: RRR without murmur  GI: good BS's,no masses, HSM or tenderness  EXTREMITIES: no cyanosis, clubbing or edema    ASSESSMENT AND PLAN:     Encounter Diagnoses   Name Primary?    Essential hypertension, benign Yes    Dyslipidemia     Coronary artery disease involving native coronary artery of native heart without angina pectoris     Bilateral carotid artery stenosis     Aortic ectasia, thoracic     Migraine without aura and without status migrainosus, not intractable     Rosacea     Eczema, unspecified type     Iron deficiency anemia secondary to inadequate dietary iron intake     Vitamin D deficiency     Osteopenia of multiple sites     Gastroesophageal reflux disease, unspecified whether esophagitis present     Opacity of lung on imaging study     Multiple thyroid nodules     Medication management     Vaccine counseling          Orders Placed This Encounter   Procedures    Comp Metabolic Panel (14)    Lipid Panel    Ferritin    Iron And Tibc       Meds & Refills for this Visit:  Requested Prescriptions     Signed Prescriptions Disp Refills    rosuvastatin 40 MG Oral Tab 90 tablet 0     Sig: Take 1  tablet (40 mg total) by mouth nightly.       Imaging & Consults:  CT CHEST (CPT=71250)  CTA CHEST (CPT=71275)       HTN -- pt is taking lisinopril 10 mg daily   DyslipidemiaCAD -- rosuvastatin 20 mg nightly  -- increase to 40 mg nightly  Aortic ectasia -- monitor 3.7 cm -- less than 4 cm is stable; CTA chest ordered  Migraines -- doing well  Rosacea -- stable per derm  Eczema -- stable  Osteopenia -- stable on fosamax  Iron def anemia -- advised FeSo4 325 mg daily and repeat iron, tibc, ferritin in 1 month; monitor for constipation  Vitamin D def -- stable  GERD -- off pantoprazole  Lung opacity -- CT stable; no risk for 4 mm pulmonary nodule  -- repeat CT chest in 5/2025  Thyroid nodules -- per Dr. Cortez;  scheduled  Vaccine counseling -- stable; CPM  Overweight -- continue to focus on diet and exercise      Mammo per gyne - Dr. Hess; UTD  Dexa is UTD.  Colonoscopy due in 2031.  -- Dr. DURANT  Sees derm and gyne.    Labs ordered for 3 months.        The patient indicates understanding of these issues and agrees to the plan.  The patient is asked to return in Return in about 3 months (around 6/4/2025) for med check 30.

## 2025-03-11 ENCOUNTER — OFFICE VISIT (OUTPATIENT)
Dept: RHEUMATOLOGY | Facility: CLINIC | Age: 65
End: 2025-03-11
Payer: COMMERCIAL

## 2025-03-11 VITALS
HEIGHT: 70 IN | RESPIRATION RATE: 16 BRPM | SYSTOLIC BLOOD PRESSURE: 138 MMHG | WEIGHT: 175 LBS | OXYGEN SATURATION: 99 % | TEMPERATURE: 97 F | HEART RATE: 86 BPM | BODY MASS INDEX: 25.05 KG/M2 | DIASTOLIC BLOOD PRESSURE: 64 MMHG

## 2025-03-11 DIAGNOSIS — M18.11 OSTEOARTHRITIS OF RIGHT THUMB: ICD-10-CM

## 2025-03-11 DIAGNOSIS — G89.29 CHRONIC PAIN OF RIGHT THUMB: ICD-10-CM

## 2025-03-11 DIAGNOSIS — M79.644 CHRONIC PAIN OF RIGHT THUMB: ICD-10-CM

## 2025-03-11 DIAGNOSIS — M25.472 LEFT ANKLE SWELLING: ICD-10-CM

## 2025-03-11 DIAGNOSIS — L65.9 ALOPECIA: ICD-10-CM

## 2025-03-11 DIAGNOSIS — Z84.0 FAMILY HISTORY OF PSORIASIS: ICD-10-CM

## 2025-03-11 DIAGNOSIS — M19.90 INFLAMMATORY ARTHRITIS: ICD-10-CM

## 2025-03-11 DIAGNOSIS — R76.8 ANA POSITIVE: Primary | ICD-10-CM

## 2025-03-11 DIAGNOSIS — R70.0 ELEVATED SED RATE: ICD-10-CM

## 2025-03-11 PROCEDURE — 99205 OFFICE O/P NEW HI 60 MIN: CPT | Performed by: INTERNAL MEDICINE

## 2025-03-11 NOTE — PROGRESS NOTES
?  RHEUMATOLOGY NEW PATIENT   Date of visit: 3/11/2025  ?  Chief Complaint   Patient presents with    [facility]     New pt. Phuong Oliver NP referral. Had swelling and pain in right hand. Xrays done. NISREEN+. Pain better now, but twinges with movement. Some stiffness in the morning. Alopecia. Converted rapid score of 0.7       ASSESSMENT, DISCUSSION & PLAN   Assessment:  1. NISREEN positive    2. Alopecia    3. Family history of psoriasis    4. Inflammatory arthritis    5. Left ankle swelling    6. Chronic pain of right thumb    7. Osteoarthritis of right thumb    8. Elevated sed rate        Discussion:  Ms. Carlotta Vargas is a 65 yo woman who had one episode of right thumb severe pain that has since resolved. Did have redness/swelling at that time. Since then, still has pain but not as severe as before and has not gotten swollen like before. Does also have b/l ankle and knee discomfort. Has known hx of DDD of the lumbar spine as well.  She does also follow with dermatology regarding alopecia. They had recommended hydroxychloroquine but pt was hesitant to start. She did already see ophthalmology and got clearance to start medication. Does have facial redness but dx with rosacea which is responding to topicals. Otherwise, denies sicca complaints or raynauds.  Recommended she go ahead and start hcq.   Recommended she get repeat labs for completeness sake.  Review xray done at outside facility- there is some periarticular osteopenia but no other signs of inflammatory arthritis. There is CMC arthritis and other early osteoarthritic changes.     Will determine follow up based off test results.     -We discussed the risks and benefits of Plaquenil therapy.  -Plaquenil is an antimalarial medication used for its anti-inflammatory properties in systemic lupus erythematosus for the reduction of flares and complications associated with the disease. This is also been demonstrated to help improve arthropathies. Side  effects of Plaquenil include retinal deposition, which may reflect unchecked, may lead to visual changes. This was discussed as was the recommendation that an ophthalmologist be seen every 6-12 months for evaluation. Other side effects include gastro intestinal upset, including nausea, vomiting, and loose stools. Rarely, Plaquenil can also be associated with myopathy. This was discussed in detail, and it was agreed that the benefit of this medication outweighs its risk.    Patient verbalized understanding of above instructions. No further questions at this time.    Code selection for this visit was based on time spent (60min) on date of service in preparing to see the patient, obtaining and/or reviewing separately obtained history, performing a medically appropriate examination, counseling and educating the patient/family/caregiver, ordering medications or testing, referring and communicating with other healthcare providers, documenting clinical information in the E HR, independently interpreting results and communicating results to the patient/family/caregiver and care coordination with the patient's other providers.    ?  Plan:  Diagnoses and all orders for this visit:    NISREEN positive  -     Anti-Nuclear Antibody (NISREEN) by IFA, Reflex Titer + Specific Antibodies; Future  -     Rheumatoid Arthritis Factor; Future  -     Cyclic Citrullinate Pep. IGG; Future  -     Sed Rate, Westergren (Automated) [E]; Future  -     C-Reactive Protein [E]; Future    Alopecia  -     Anti-Nuclear Antibody (NISREEN) by IFA, Reflex Titer + Specific Antibodies; Future  -     Rheumatoid Arthritis Factor; Future  -     Cyclic Citrullinate Pep. IGG; Future  -     Sed Rate, Westergren (Automated) [E]; Future  -     C-Reactive Protein [E]; Future    Family history of psoriasis    Inflammatory arthritis  -     Anti-Nuclear Antibody (NISREEN) by IFA, Reflex Titer + Specific Antibodies; Future  -     Rheumatoid Arthritis Factor; Future  -     Cyclic  Citrullinate Pep. IGG; Future  -     Sed Rate, Westergren (Automated) [E]; Future  -     C-Reactive Protein [E]; Future    Left ankle swelling    Chronic pain of right thumb    Osteoarthritis of right thumb    Elevated sed rate  -     Sed Rate, Westergren (Automated) [E]; Future  -     C-Reactive Protein [E]; Future        No follow-ups on file.  ?  HPI   Carlotta Vargas is a 64 year old female with the following active problems who is referred for rheumatologic evaluation due to joint pain and abnormal labs.     Around July of last year, she developed right thumb/hand swelling and pain. States severe pain and difficult to find any position that wasn't painful. States was severe enough that she almost felt like passing out. Went to ER when out of town- told work up at that time was negative. Had xrays done which showed moderated arthritis and given rx for motrin. Took benadryl due to concern for possible allergic reaction or exposure while gardening   States pain hasn't been severe since that time. Lasted for about 1-2 weeks.   Still can get pain when using the hand (like computer use, writing, etc).   No recurrence of swelling.     Following with dermatology regularly  Dx with frontal alopecia. Recommended considering hydroxychloroquine.  Seen by ophthalmology- Dr. Sanchez and got clearance but has not started yet.   Also has facial redness and bumps over the face. Dx with rosacea.   No skin biopsies. Scalp itching and can ooze. Using clobetasol as needed.   Denies any other rashes on the body.     + joint pain- typically with weather changes in the knees. At times can be uncomfortable. Had swelling years ago but nothing recently   + b/l ankle discomfort with hx spraining/injury. Hx of avulsion fracture in 2022.  + hx of left sided sciatica. Did PT without much relief. Had injection through Dr. Adams in 2022. Then started exercising regularly and has not had recurrence of that pain.   + morning stiffness and  after periods of sitting for prolonged periods. Felt primarily in the knees/legs.     + nodules over the thumbs and possible cyst over the wrist  + hands/feet get cold easily without color changes  + intermittent palpitations, wore heart monitor and told nothing major (last done in 2023)   + hx of anemia, unclear details from pt. Told tos tart iron supplementation by PCP recently. Does donate blood regularly but not recently. Last cscope in July of 2021- no polyps, did have diverticula and internal hemorrhoids.   + hx of hiatal hernia and reflux, on pantoprazole. Did require esophageal dilation.   + hx of plantar fasciitis in 2015 s/p PT and wears insoles without recurrence   + hx of seasonal allergies- takes antihistamines regularly. Previously did allergy shots when a child.   + hx of thyroid nodules, following with ENT   + hx of alendronate use for osteopenia since 2018 through PCP    One son with IVF in 1995  One ruptured ectopic in 1996  One daughter naturally in 1997  One miscarriage in 2000- thinks first trimester.   No further attempt to conceive.  Had ablation in 2004 due to heavy menstrual cycles.      The patient denies oral or nasal ulcers, photosensitive rash, Raynaud's phenomenon, prior renal or liver disease, or history of seizures.  No history of prior blood clot in the legs or lungs, strokes or ischemic phenomenon.   There are no symptoms of severe dry eyes, dry mouth, recurrent cavities, or swelling of the cheeks or under the jawbone.  The patient denies any history of uveitis, crampy abdominal pain,  constipation, diarrhea, bloody stools, nodular painful shin bruises, Achilles heel pain, psoriatic lesions, spooning or pitting of the nails, history of dactylitis, or pain awakening the patient from sleep.  No fevers, chills, lymphadenopathy, night sweats, unexpected weight loss, easy bruising or bleeding, or unexplained weakness.    Denies chronic sinus infections/disease or epistaxis.  Denies  chronic cough or hemoptysis.     Family hx:  Paternal grandmother with RA very debilitating.    Father with arthritis diffusely- specifically not RA. But had psoriasis       Past Medical History:  Past Medical History:    Abdominal hernia    Constipation    occasionally    Diarrhea, unspecified    occasionally    Flatulence/gas pain/belching    It seems to be worse lately.    Heartburn    Hemorrhoids    High cholesterol    Hypercholesterolemia    Indigestion    Irregular bowel habits    Nausea    Night sweats    Other psoriasis    Painful swallowing    Problems with swallowing    Rosacea    Skin blushing/flushing    Unspecified essential hypertension    Vomiting    not stomach contents    Wears glasses     Past Surgical History:  Past Surgical History:   Procedure Laterality Date    Colonoscopy  2005, 2011    D & c  1985    Egd  12/1/2015    Endometrial ablation  2006    Thermal endometrial ablation    Hernia surgery      Inguinal hernia repair  1986    Other surgical history  1994    In Vitro Fertilization    Other surgical history  1996    ruptured ectopic    Tonsillectomy  1967     Family History:  Family History   Problem Relation Age of Onset    Heart Disorder Maternal Grandmother         CHF, cardiomegaly    Diabetes Maternal Grandmother     Heart Disorder Maternal Grandfather         MI    Diabetes Maternal Grandfather     Other (alcoholic) Maternal Grandfather     Heart Disorder Paternal Grandmother     Diabetes Paternal Grandmother     Stroke Paternal Grandmother     Other (rheumatoid arthritis) Paternal Grandmother         RA    Heart Disorder Paternal Grandfather     Diabetes Paternal Grandfather     Other (alcoholic) Paternal Grandfather     Cancer Father 70        Non hodgkins Lymphoma    Heart Disorder Father         tachycardia    Hypertension Father     Prostate Cancer Father 60    Thyroid Disorder Father         Hyperthyroid    Arthritis Father         Psoriatic    Other (multiple myeloma) Father      Colon Polyps Father     Heart Disorder Mother         CABG x2    Hypertension Mother     Lipids Mother     Dementia Mother     Lipids Sister     Thyroid Disorder Sister         Hyperthyroid     Social History:  Social History     Socioeconomic History    Marital status:    Tobacco Use    Smoking status: Never    Smokeless tobacco: Never   Vaping Use    Vaping status: Never Used   Substance and Sexual Activity    Alcohol use: Yes     Alcohol/week: 3.0 standard drinks of alcohol    Drug use: No    Sexual activity: Yes     Partners: Male     Comment: 9/16/2021   Other Topics Concern    Caffeine Concern Yes     Comment: 2 a day    Exercise Yes     Comment: walks     Social Drivers of Health     Food Insecurity: No Food Insecurity (3/4/2025)    NCSS - Food Insecurity     Worried About Running Out of Food in the Last Year: No     Ran Out of Food in the Last Year: No   Transportation Needs: No Transportation Needs (3/4/2025)    NCSS - Transportation     Lack of Transportation: No   Housing Stability: Not At Risk (3/4/2025)    NCSS - Housing/Utilities     Has Housing: Yes     Worried About Losing Housing: No     Unable to Get Utilities: No     Medications:  Medications Taking[1]  Modified Medications    No medications on file     There are no discontinued medications.  ?  ?  Allergies:  Allergies[2]  ?  REVIEW OF SYSTEMS   ?  Review of Systems   Constitutional:  Negative for chills, fever and weight loss.   HENT:  Negative for congestion, hearing loss, nosebleeds and tinnitus.    Eyes:  Negative for pain and redness.   Respiratory:  Negative for cough, hemoptysis and shortness of breath.    Cardiovascular:  Positive for palpitations. Negative for chest pain and leg swelling.   Gastrointestinal:  Positive for heartburn. Negative for abdominal pain, blood in stool, constipation, diarrhea and nausea.   Genitourinary:  Negative for dysuria, frequency, hematuria and urgency.   Musculoskeletal:  Positive for back pain  and joint pain. Negative for myalgias and neck pain.   Skin:  Positive for rash. Negative for itching.   Neurological:  Negative for dizziness, tingling, seizures, weakness and headaches.   Endo/Heme/Allergies:  Positive for environmental allergies. Does not bruise/bleed easily.     PHYSICAL EXAM   Today's Vitals:  Temperature Blood Pressure Heart Rate Resp Rate SpO2   Temp: 97.3 °F (36.3 °C) BP: 138/64 Pulse: 86 Resp: 16 SpO2: 99 %   ?  Current Weight Height BMI BSA Pain   Wt Readings from Last 1 Encounters:   03/11/25 175 lb (79.4 kg)    Height: 5' 10\" (177.8 cm) Body mass index is 25.11 kg/m². Body surface area is 1.97 meters squared.         Physical Exam  Vitals and nursing note reviewed.   Constitutional:       General: She is not in acute distress.     Appearance: She is well-developed. She is not diaphoretic.   HENT:      Head: Normocephalic.      Nose: Nose normal.   Eyes:      General: No scleral icterus.     Extraocular Movements: Extraocular movements intact.      Conjunctiva/sclera: Conjunctivae normal.   Neck:      Vascular: No JVD.      Trachea: No tracheal deviation.   Cardiovascular:      Rate and Rhythm: Normal rate and regular rhythm.      Heart sounds: Normal heart sounds. No murmur heard.  Pulmonary:      Effort: Pulmonary effort is normal. No respiratory distress.      Breath sounds: Normal breath sounds. No wheezing.   Musculoskeletal:         General: Swelling, tenderness and deformity present.      Cervical back: Neck supple.      Comments: Right 1st cmc bony enlargement  B/l thumb MCP enlargement R>L  No swelling, tenderness, redness or restriction of motion of the DIPs, PIPs, remaining MCPs, wrists, elbows, or joints of the feet.  B/l medial mal tenderness; left swollen  No achilles tenderness   Bilateral shoulders with full ROM, no evidence of impingement with provocative maneuvers.  Bilateral knees without medial joint line tenderness, moderate left crepitus, no effusion. Left knee bony  enlargement    Lymphadenopathy:      Cervical: No cervical adenopathy.   Skin:     General: Skin is warm and dry.      Findings: No erythema or rash.   Neurological:      Mental Status: She is alert and oriented to person, place, and time.      Cranial Nerves: No cranial nerve deficit.      Gait: Gait normal.   Psychiatric:         Mood and Affect: Mood normal.         Behavior: Behavior normal.       ?  Radiology review:      Narrative   PROCEDURE:  XR DEXA BONE DENSITOMETRY (CPT=77080)     COMPARISON:  [facility] XR, XR DEXA BONE DENSITOMETRY (CPT=77080), 7/25/2022, 8:58 AM.     INDICATIONS:  Z13.820 Screening for osteoporosis     PATIENT STATED HISTORY: (As transcribed by Technologist)  Screening for osteoporosis          LUMBAR SPINE ANALYSIS RESULTS:      Bone mineral density (BMD) (g/cm2):  0.890    Lumbar T-Score:  -1.4      % young normals:  85      % age matched controls:  102      Change from prior spine examination:  4.6%              TOTAL HIP ANALYSIS RESULTS:        Bone mineral density (BMD) (g/cm2):  0.890      Total Hip T-Score:  -0.4      % young normals:  94      % age matched controls:  110      Change from prior hip examination:  9.1*%              FEMORAL NECK ANALYSIS RESULTS:        Bone mineral density (BMD) (g/cm2):  0.695      Femoral neck T-Score:  -1.4      % young normals:  82      % age matched controls:  100      Change from prior hip examination:  2.3%         10 Year Fracture Risk:     Major osteoporotic fracture:  8.2 %     Hip fracture:  0.7 %     Advise reference with published guidelines regarding criteria for pharmacological treatment to prevent osteoporosis, for example as published by the Bone Health and Osteoporosis Foundation guidelines, which on their site references this Consensus  Statement:  www.bit.ly/osteoguideline     ADDITIONAL FINDINGS:  No significant additional findings.                     Impression   CONCLUSION:  Osteopenia lumbar spine and femoral neck      Recommendation:  The National Osteoporosis Foundation (NOF) recommends pharmacological treatment for patients with a FRAX 10-year risk score of 3% or higher for a hip fracture or 20% or higher for a major osteoporotic fracture, to prevent osteoporosis  and reduce fracture risk.     The World Health Organization has defined the following categories based on bone density:  Normal bone:  T-score greater than or equal to -1  Osteopenia: T-score  less than -1 and greater  than -2.5  Osteoporosis:  T-score less than or equal -2.5        LOCATION:  [place]              Dictated by (CST): Lito Baltazar MD on 8/29/2024 at 9:25 AM      Finalized by (CST): Lito Baltazar MD on 8/29/2024 at 9:25 AM         Labs:  Lab Results   Component Value Date    WBC 4.5 03/03/2025    RBC 4.50 03/03/2025    HGB 13.8 03/03/2025    HCT 41.2 03/03/2025    .0 03/03/2025    MPV 10.1 01/16/2013    MCV 91.6 03/03/2025    MCH 30.7 03/03/2025    MCHC 33.5 03/03/2025    RDW 13.2 03/03/2025    NEPRELIM 2.65 03/03/2025    NEPERCENT 58.8 03/03/2025    LYPERCENT 30.8 03/03/2025    MOPERCENT 7.8 03/03/2025    EOPERCENT 2.2 03/03/2025    BAPERCENT 0.4 03/03/2025    NE 2.65 03/03/2025    LYMABS 1.39 03/03/2025    MOABSO 0.35 03/03/2025    EOABSO 0.10 03/03/2025    BAABSO 0.02 03/03/2025     Lab Results   Component Value Date    GLU 94 03/03/2025    BUN 14 03/03/2025    BUNCREA 13.9 03/03/2025    CREATSERUM 1.01 03/03/2025    ANIONGAP 7 03/03/2025    GFR 75 12/19/2017    GFRNAA 67 03/11/2022    GFRAA 78 03/11/2022    CA 9.5 03/03/2025    OSMOCALC 288 03/03/2025    ALKPHO 55 03/03/2025    AST 32 03/03/2025    ALT 22 03/03/2025    BILT 0.4 03/03/2025    TP 7.6 03/03/2025    ALB 4.8 03/03/2025    GLOBULIN 2.8 03/03/2025    AGRATIO 1.7 05/17/2011     03/03/2025    K 4.1 03/03/2025     03/03/2025    CO2 29.0 03/03/2025       Additional Labs:    08/2024  NISREEN 1:320 homogenous   dsDNA, SSA, SSB, U1RNP, RNP70, scl70, centromere, jo1  negative  ESR 47 elevated  CRP normal   RF negative  CCP negative  Uric acid normal     Maranda Hernandez DO  [facility]  3/11/2025           [1]   Outpatient Medications Marked as Taking for the 3/11/25 encounter (Office Visit) with Maranda Hernandez DO   Medication Sig Dispense Refill    rosuvastatin 40 MG Oral Tab Take 1 tablet (40 mg total) by mouth nightly. 90 tablet 0    metroNIDAZOLE 0.75 % External Cream Apply 1 Application topically 2 (two) times daily. 45 g 2    pantoprazole 20 MG Oral Tab EC Take 1 tablet (20 mg total) by mouth every morning before breakfast. 90 tablet 1    alendronate 70 MG Oral Tab Take 1 tablet (70 mg total) by mouth every 7 days. 12 tablet 1    lisinopril 10 MG Oral Tab Take 1 tablet (10 mg total) by mouth daily. 90 tablet 1    SULFACLEANSE 8/4 8-4 % External Suspension Apply 1 Application  topically daily. 473 mL 3    clobetasol 0.05 % External Solution Apply to the scalp daily 50 mL 3    Adapalene 0.3 % External Gel Differen OTC      Vitamin D3 2000 units Oral Cap Take 1 capsule (2,000 Units total) by mouth daily.      Calcium Carbonate 600 MG Oral Tab Take 1 tablet (600 mg total) by mouth as needed with dialysis for Heartburn.      Multiple Vitamins-Minerals (ONE-A-DAY WOMENS 50 PLUS OR) Take by mouth. Centrum silver     [2] No Known Allergies

## 2025-03-14 ENCOUNTER — LAB ENCOUNTER (OUTPATIENT)
Dept: LAB | Age: 65
End: 2025-03-14
Attending: FAMILY MEDICINE
Payer: COMMERCIAL

## 2025-03-14 DIAGNOSIS — L65.9 ALOPECIA: ICD-10-CM

## 2025-03-14 DIAGNOSIS — R76.8 ANA POSITIVE: ICD-10-CM

## 2025-03-14 DIAGNOSIS — M19.90 INFLAMMATORY ARTHRITIS: ICD-10-CM

## 2025-03-14 DIAGNOSIS — R70.0 ELEVATED SED RATE: ICD-10-CM

## 2025-03-14 LAB
CRP SERPL-MCNC: <0.4 MG/DL (ref ?–1)
ERYTHROCYTE [SEDIMENTATION RATE] IN BLOOD: 33 MM/HR
RHEUMATOID FACT SERPL-ACNC: <3.5 IU/ML (ref ?–14)

## 2025-03-14 PROCEDURE — 86225 DNA ANTIBODY NATIVE: CPT

## 2025-03-14 PROCEDURE — 36415 COLL VENOUS BLD VENIPUNCTURE: CPT

## 2025-03-14 PROCEDURE — 86235 NUCLEAR ANTIGEN ANTIBODY: CPT

## 2025-03-14 PROCEDURE — 85652 RBC SED RATE AUTOMATED: CPT

## 2025-03-14 PROCEDURE — 86039 ANTINUCLEAR ANTIBODIES (ANA): CPT

## 2025-03-14 PROCEDURE — 86038 ANTINUCLEAR ANTIBODIES: CPT

## 2025-03-14 PROCEDURE — 86431 RHEUMATOID FACTOR QUANT: CPT

## 2025-03-14 PROCEDURE — 86200 CCP ANTIBODY: CPT

## 2025-03-14 PROCEDURE — 86140 C-REACTIVE PROTEIN: CPT

## 2025-03-17 LAB — CCP IGG SERPL-ACNC: 1.4 U/ML (ref 0–6.9)

## 2025-03-18 LAB
ANA NUCLEOLAR TITR SER IF: 320 {TITER}
DSDNA AB TITR SER: <10 {TITER}
NUCLEAR IGG TITR SER IF: POSITIVE {TITER}

## 2025-03-19 LAB
CENTROMERE IGG SER-ACNC: 0.6 U/ML
ENA JO1 AB SER IA-ACNC: 0.4 U/ML
ENA RNP IGG SER IA-ACNC: 0.6 U/ML
ENA SCL70 IGG SER IA-ACNC: 2.2 U/ML
ENA SM IGG SER IA-ACNC: <0.7 U/ML
ENA SS-A IGG SER IA-ACNC: 0.5 U/ML
ENA SS-B IGG SER IA-ACNC: 0.6 U/ML
U1 SNRNP IGG SER IA-ACNC: 1.3 U/ML

## 2025-03-21 ENCOUNTER — TELEPHONE (OUTPATIENT)
Facility: CLINIC | Age: 65
End: 2025-03-21

## 2025-03-21 NOTE — TELEPHONE ENCOUNTER
LVM for pt to call back to discuss results.   13th Lab message sent.    Maranda Hernandez, DO  [facility]  3/21/2025        03/2025  NISREEN 1: 320 homogenous  dsDNA, SSA, SSB, Smith, UN RP, RNP 70, centromere, SCL 70, Yaritza 1 negative  ESR 33 elevated  CRP normal  RF negative  CCP negative

## 2025-03-24 ENCOUNTER — APPOINTMENT (OUTPATIENT)
Dept: GENERAL RADIOLOGY | Age: 65
End: 2025-03-24
Payer: COMMERCIAL

## 2025-03-24 ENCOUNTER — HOSPITAL ENCOUNTER (OUTPATIENT)
Dept: GENERAL RADIOLOGY | Age: 65
Discharge: HOME | End: 2025-03-24
Payer: COMMERCIAL

## 2025-03-24 DIAGNOSIS — R22.31 LOCALIZED SWELLING OF RIGHT THUMB: ICD-10-CM

## 2025-03-24 DIAGNOSIS — M25.541 ARTHRALGIA OF RIGHT HAND: ICD-10-CM

## 2025-03-24 DIAGNOSIS — R76.8 POSITIVE ANA (ANTINUCLEAR ANTIBODY): ICD-10-CM

## 2025-03-24 DIAGNOSIS — R70.0 ELEVATED SED RATE: ICD-10-CM

## 2025-03-24 DIAGNOSIS — Z82.61 FAMILY HISTORY OF RHEUMATOID ARTHRITIS: ICD-10-CM

## 2025-03-24 PROCEDURE — 73130 X-RAY EXAM OF HAND: CPT

## 2025-03-24 PROCEDURE — 73630 X-RAY EXAM OF FOOT: CPT

## 2025-03-25 ENCOUNTER — VIRTUAL PHONE E/M (OUTPATIENT)
Dept: RHEUMATOLOGY | Facility: CLINIC | Age: 65
End: 2025-03-25
Payer: COMMERCIAL

## 2025-03-25 DIAGNOSIS — M15.0 PRIMARY OSTEOARTHRITIS INVOLVING MULTIPLE JOINTS: ICD-10-CM

## 2025-03-25 DIAGNOSIS — R70.0 ELEVATED SED RATE: ICD-10-CM

## 2025-03-25 DIAGNOSIS — R76.8 ANA POSITIVE: Primary | ICD-10-CM

## 2025-03-25 DIAGNOSIS — L65.9 ALOPECIA: ICD-10-CM

## 2025-03-25 PROCEDURE — 98014 SYNCH AUDIO-ONLY EST MOD 30: CPT | Performed by: INTERNAL MEDICINE

## 2025-03-25 NOTE — PROGRESS NOTES
Telephone appointment - audio only  Office visit canceled due to provider being sick     Carlotta Vargas consents to a virtual/telephone check in service on 0325/2025.  Patient understands and accepts financial responsibility for any deductible, coinsurance and/or co-pays associated with the service.  ?  RHEUMATOLOGY FOLLOW UP   Date of visit: 3/25/2025  ?  Chief Complaint   Patient presents with    Follow - Up     Presents for follow up from recent NP appt to discuss test results.        ASSESSMENT, DISCUSSION & PLAN   Assessment:  1. NISREEN positive    2. Alopecia    3. Primary osteoarthritis involving multiple joints    4. Elevated sed rate          Discussion:  Ms. Carlotta Vargas is a 63 yo woman who had one episode of right thumb severe pain that has since resolved. Did have redness/swelling at that time. Since then, still has pain but not as severe as before and has not gotten swollen like before. Does also have b/l ankle and knee discomfort. Has known hx of DDD of the lumbar spine as well.  She does also follow with dermatology regarding alopecia. They had recommended hydroxychloroquine but pt was hesitant to start. She did already see ophthalmology and got clearance to start medication. Does have facial redness but dx with rosacea which is responding to topicals. Otherwise, denies sicca complaints or raynauds.  Repeat labs showed persistent NISREEN positivity but still negative BETTY panel. RF/CCP were negative. CRP remained normal. ESR still borderline elevated but improved compared to August labs.   Her xrays seem more consistent with osteoarthritis rather than inflammatory arthritis  Recommended she start the hcq as ordered/recommended by her dermatologist.  She will update me on how she is feeling  Discussed prior inflammation in the thumb could have been a tendonitis vs cppd flare vs other. She was instructed to reach out if her symptoms change/worsen or flare in the meantime.  Otherwise, she is  okay with keeping follow up in January.  Discussed will likely have her follow annually for the next few years and if no worsened s symptoms in that time, can follow up prn.     Patient verbalized understanding of above instructions. No further questions at this time.    Code selection for this visit was based on time spent (30min) on date of service in preparing to see the patient, obtaining and/or reviewing separately obtained history, performing a medically appropriate examination, counseling and educating the patient/family/caregiver, ordering medications or testing, referring and communicating with other healthcare providers, documenting clinical information in the E HR, independently interpreting results and communicating results to the patient/family/caregiver and care coordination with the patient's other providers.    ?  Plan:  Diagnoses and all orders for this visit:    NISREEN positive  -     C-Reactive Protein; Future  -     Sed Rate, Westergren (Automated); Future  -     Complement C3, Serum; Future  -     Complement C4, Serum; Future  -     Anti-Nuclear Antibody (NISREEN) by IFA, Reflex Titer + Specific Antibodies; Future    Alopecia  -     C-Reactive Protein; Future  -     Sed Rate, Westergren (Automated); Future  -     Complement C3, Serum; Future  -     Complement C4, Serum; Future  -     Anti-Nuclear Antibody (NISREEN) by IFA, Reflex Titer + Specific Antibodies; Future    Primary osteoarthritis involving multiple joints    Elevated sed rate  -     C-Reactive Protein; Future  -     Sed Rate, Westergren (Automated); Future  -     Complement C3, Serum; Future  -     Complement C4, Serum; Future  -     Anti-Nuclear Antibody (NISREEN) by IFA, Reflex Titer + Specific Antibodies; Future          Return in about 10 months (around 1/25/2026).  ?  HPI   Carlotta Vargas is a 64 year old female with the following active problems who is referred for rheumatologic evaluation due to joint pain and abnormal labs. She  presents for follow up to discuss test results.     Having some worsened pain in the hand/right thumb with writing thank you cards but not as severe as before (unfortunately her mother  recently)    HPI from initial consultation  referred for rheumatologic evaluation due to joint pain and abnormal labs.    Around July of last year, she developed right thumb/hand swelling and pain. States severe pain and difficult to find any position that wasn't painful. States was severe enough that she almost felt like passing out. Went to ER when out of town- told work up at that time was negative. Had xrays done which showed moderated arthritis and given rx for motrin. Took benadryl due to concern for possible allergic reaction or exposure while gardening   States pain hasn't been severe since that time. Lasted for about 1-2 weeks.   Still can get pain when using the hand (like computer use, writing, etc).   No recurrence of swelling.     Following with dermatology regularly  Dx with frontal alopecia. Recommended considering hydroxychloroquine.  Seen by ophthalmology- Dr. Sanchez and got clearance but has not started yet.   Also has facial redness and bumps over the face. Dx with rosacea.   No skin biopsies. Scalp itching and can ooze. Using clobetasol as needed.   Denies any other rashes on the body.     + joint pain- typically with weather changes in the knees. At times can be uncomfortable. Had swelling years ago but nothing recently   + b/l ankle discomfort with hx spraining/injury. Hx of avulsion fracture in .  + hx of left sided sciatica. Did PT without much relief. Had injection through Dr. Adams in . Then started exercising regularly and has not had recurrence of that pain.   + morning stiffness and after periods of sitting for prolonged periods. Felt primarily in the knees/legs.     + nodules over the thumbs and possible cyst over the wrist  + hands/feet get cold easily without color changes  + intermittent  palpitations, wore heart monitor and told nothing major (last done in 2023)   + hx of anemia, unclear details from pt. Told tos tart iron supplementation by PCP recently. Does donate blood regularly but not recently. Last cscope in July of 2021- no polyps, did have diverticula and internal hemorrhoids.   + hx of hiatal hernia and reflux, on pantoprazole. Did require esophageal dilation.   + hx of plantar fasciitis in 2015 s/p PT and wears insoles without recurrence   + hx of seasonal allergies- takes antihistamines regularly. Previously did allergy shots when a child.   + hx of thyroid nodules, following with ENT   + hx of alendronate use for osteopenia since 2018 through PCP    One son with IVF in 1995  One ruptured ectopic in 1996  One daughter naturally in 1997  One miscarriage in 2000- thinks first trimester.   No further attempt to conceive.  Had ablation in 2004 due to heavy menstrual cycles.      The patient denies oral or nasal ulcers, photosensitive rash, Raynaud's phenomenon, prior renal or liver disease, or history of seizures.  No history of prior blood clot in the legs or lungs, strokes or ischemic phenomenon.   There are no symptoms of severe dry eyes, dry mouth, recurrent cavities, or swelling of the cheeks or under the jawbone.  The patient denies any history of uveitis, crampy abdominal pain,  constipation, diarrhea, bloody stools, nodular painful shin bruises, Achilles heel pain, psoriatic lesions, spooning or pitting of the nails, history of dactylitis, or pain awakening the patient from sleep.  No fevers, chills, lymphadenopathy, night sweats, unexpected weight loss, easy bruising or bleeding, or unexplained weakness.  Denies chronic sinus infections/disease or epistaxis.  Denies chronic cough or hemoptysis.     Family hx:  Paternal grandmother with RA very debilitating.    Father with arthritis diffusely- specifically not RA. But had psoriasis       Past Medical History:  Past Medical History:     Abdominal hernia    Constipation    occasionally    Diarrhea, unspecified    occasionally    Flatulence/gas pain/belching    It seems to be worse lately.    Heartburn    Hemorrhoids    High cholesterol    Hypercholesterolemia    Indigestion    Irregular bowel habits    Nausea    Night sweats    Other psoriasis    Painful swallowing    Problems with swallowing    Rosacea    Skin blushing/flushing    Unspecified essential hypertension    Vomiting    not stomach contents    Wears glasses     Past Surgical History:  Past Surgical History:   Procedure Laterality Date    Colonoscopy  2005, 2011    D & c  1985    Egd  12/1/2015    Endometrial ablation  2006    Thermal endometrial ablation    Hernia surgery      Inguinal hernia repair  1986    Other surgical history  1994    In Vitro Fertilization    Other surgical history  1996    ruptured ectopic    Tonsillectomy  1967     Family History:  Family History   Problem Relation Age of Onset    Heart Disorder Maternal Grandmother         CHF, cardiomegaly    Diabetes Maternal Grandmother     Heart Disorder Maternal Grandfather         MI    Diabetes Maternal Grandfather     Other (alcoholic) Maternal Grandfather     Heart Disorder Paternal Grandmother     Diabetes Paternal Grandmother     Stroke Paternal Grandmother     Other (rheumatoid arthritis) Paternal Grandmother         RA    Heart Disorder Paternal Grandfather     Diabetes Paternal Grandfather     Other (alcoholic) Paternal Grandfather     Cancer Father 70        Non hodgkins Lymphoma    Heart Disorder Father         tachycardia    Hypertension Father     Prostate Cancer Father 60    Thyroid Disorder Father         Hyperthyroid    Arthritis Father         Psoriatic    Other (multiple myeloma) Father     Colon Polyps Father     Heart Disorder Mother         CABG x2    Hypertension Mother     Lipids Mother     Dementia Mother     Lipids Sister     Thyroid Disorder Sister         Hyperthyroid     Social History:  Social  History     Socioeconomic History    Marital status:    Tobacco Use    Smoking status: Never    Smokeless tobacco: Never   Vaping Use    Vaping status: Never Used   Substance and Sexual Activity    Alcohol use: Yes     Alcohol/week: 3.0 standard drinks of alcohol    Drug use: No    Sexual activity: Yes     Partners: Male     Comment: 9/16/2021   Other Topics Concern    Caffeine Concern Yes     Comment: 2 a day    Exercise Yes     Comment: walks     Social Drivers of Health     Food Insecurity: No Food Insecurity (3/4/2025)    NCSS - Food Insecurity     Worried About Running Out of Food in the Last Year: No     Ran Out of Food in the Last Year: No   Transportation Needs: No Transportation Needs (3/4/2025)    NCSS - Transportation     Lack of Transportation: No   Housing Stability: Not At Risk (3/4/2025)    NCSS - Housing/Utilities     Has Housing: Yes     Worried About Losing Housing: No     Unable to Get Utilities: No     Medications:  Medications Taking[1]  Modified Medications    No medications on file     There are no discontinued medications.  ?  ?  Allergies:  Allergies[2]  ?  REVIEW OF SYSTEMS   ?  Review of Systems   Constitutional:  Negative for chills, fever and weight loss.   HENT:  Negative for congestion, hearing loss, nosebleeds and tinnitus.    Eyes:  Negative for pain and redness.   Respiratory:  Negative for cough, hemoptysis and shortness of breath.    Cardiovascular:  Positive for palpitations. Negative for chest pain and leg swelling.   Gastrointestinal:  Positive for heartburn. Negative for abdominal pain, blood in stool, constipation, diarrhea and nausea.   Genitourinary:  Negative for dysuria, frequency, hematuria and urgency.   Musculoskeletal:  Positive for back pain and joint pain. Negative for myalgias and neck pain.   Skin:  Positive for rash. Negative for itching.   Neurological:  Negative for dizziness, tingling, seizures, weakness and headaches.   Endo/Heme/Allergies:  Positive  for environmental allergies. Does not bruise/bleed easily.     PHYSICAL EXAM   Today's Vitals:  Temperature Blood Pressure Heart Rate Resp Rate SpO2               ?  Current Weight Height BMI BSA Pain   Wt Readings from Last 1 Encounters:   03/11/25 175 lb (79.4 kg)      There is no height or weight on file to calculate BMI. There is no height or weight on file to calculate BSA.         Physical Exam  Vitals and nursing note reviewed.   Constitutional:       General: She is not in acute distress.     Appearance: She is well-developed.   Neck:      Vascular: No JVD.      Trachea: No tracheal deviation.   Pulmonary:      Effort: No respiratory distress.   Musculoskeletal:      Comments: (Prior exam)  Right 1st cmc bony enlargement  B/l thumb MCP enlargement R>L  No swelling, tenderness, redness or restriction of motion of the DIPs, PIPs, remaining MCPs, wrists, elbows, or joints of the feet.  B/l medial mal tenderness; left swollen  No achilles tenderness   Bilateral shoulders with full ROM, no evidence of impingement with provocative maneuvers.  Bilateral knees without medial joint line tenderness, moderate left crepitus, no effusion. Left knee bony enlargement    Neurological:      Mental Status: She is alert and oriented to person, place, and time.   Psychiatric:         Mood and Affect: Mood normal.         Behavior: Behavior normal.       ?  Radiology review:  XR HAND BILAT (MIN 3 VIEWS) (CPT=73130-50)    Result Date: 3/24/2025  CONCLUSION:  Mild osteoarthritic changes.   LOCATION:  [facility]   Dictated by (CST): Mansoor Velazquez MD on 3/24/2025 at 12:19 PM     Finalized by (CST): Mansoor Velazquez MD on 3/24/2025 at 12:20 PM       XR FOOT, COMPLETE (MIN 3 VIEWS), BILAT (CPT=73630-50)    Result Date: 3/24/2025  CONCLUSION:  Scattered arthritic changes.  As above.   LOCATION:  [facility]   Dictated by (CST): Mansoor Velazquez MD on 3/24/2025 at 12:05 PM     Finalized by (CST): Mansoor Velazquez MD on 3/24/2025 at 12:07 PM        Narrative    PROCEDURE:  XR FOOT, COMPLETE (MIN 3 VIEWS), BILAT (CPT=73630-50)     TECHNIQUE:  A total of 6 views were obtained. Three views of the right foot and three views of the left foot.     INDICATIONS:  R70.0 Elevated sed rate M25.541 Arthralgia of right hand R22.31 Localized swelling of right thumb Z82.61 Family history of rheumatoid arthritis R76.8 Positive *     COMPARISON:  [facility], XR, XR ANKLE (MIN 3 VIEWS), LEFT (CPT=73610), 5/24/2022, 1:58 PM.     PATIENT STATED HISTORY: (As transcribed by Technologist)  Arthritis         FINDINGS:  Mild to moderate degenerative changes at the right 1st MTP joint.  Scattered minimal to mild arthritic changes elsewhere, mostly in the peripheral, IP joints of the right foot and left foot.  No fracture, expansile lesion or erosion.  Tiny  right plantar heel spur.  No adjacent soft tissue calcifications.           Impression   CONCLUSION:  Scattered arthritic changes.  As above.        LOCATION:  [facility]        Dictated by (CST): Mansoor Velazquez MD on 3/24/2025 at 12:05 PM      Finalized by (CST): Mansoor Velazquez MD on 3/24/2025 at 12:07 PM       Narrative   PROCEDURE:  XR HAND (MIN 3 VIEWS) BILAT (CPT=73130)     TECHNIQUE:  A total of 6 views were obtained. Three views of the right hand and three views of the left hand.     COMPARISON:  None.     INDICATIONS:  R70.0 Elevated sed rate M25.541 Arthralgia of right hand R22.31 Localized swelling of right thumb Z82.61 Family history of rheumatoid arthritis R76.8 Positive *     PATIENT STATED HISTORY: (As transcribed by Technologist)  Arthritis.         FINDINGS:  Scattered mild degenerative changes, most pronounced at the 1st CMC articulation.  Pattern consistent with osteoarthritis.  No fracture, expansile lesion or erosion.  Fourth finger ring is present, obscuring underlying detail.        Impression   CONCLUSION:  Mild osteoarthritic changes.        LOCATION:  [facility]        Dictated by (CST): Mansoor Velazquez MD on 3/24/2025 at 12:19 PM       Finalized by (CST): Mansoor Velazquez MD on 3/24/2025 at 12:20 PM         Narrative   PROCEDURE:  XR DEXA BONE DENSITOMETRY (CPT=77080)     COMPARISON:  [facility], XR DEXA BONE DENSITOMETRY (CPT=77080), 7/25/2022, 8:58 AM.     INDICATIONS:  Z13.820 Screening for osteoporosis     PATIENT STATED HISTORY: (As transcribed by Technologist)  Screening for osteoporosis          LUMBAR SPINE ANALYSIS RESULTS:      Bone mineral density (BMD) (g/cm2):  0.890    Lumbar T-Score:  -1.4      % young normals:  85      % age matched controls:  102      Change from prior spine examination:  4.6%              TOTAL HIP ANALYSIS RESULTS:        Bone mineral density (BMD) (g/cm2):  0.890      Total Hip T-Score:  -0.4      % young normals:  94      % age matched controls:  110      Change from prior hip examination:  9.1*%              FEMORAL NECK ANALYSIS RESULTS:        Bone mineral density (BMD) (g/cm2):  0.695      Femoral neck T-Score:  -1.4      % young normals:  82      % age matched controls:  100      Change from prior hip examination:  2.3%         10 Year Fracture Risk:     Major osteoporotic fracture:  8.2 %     Hip fracture:  0.7 %     Advise reference with published guidelines regarding criteria for pharmacological treatment to prevent osteoporosis, for example as published by the Bone Health and Osteoporosis Foundation guidelines, which on their site references this Consensus  Statement:  www.bit.ly/osteoguideline     ADDITIONAL FINDINGS:  No significant additional findings.                     Impression   CONCLUSION:  Osteopenia lumbar spine and femoral neck     Recommendation:  The National Osteoporosis Foundation (NOF) recommends pharmacological treatment for patients with a FRAX 10-year risk score of 3% or higher for a hip fracture or 20% or higher for a major osteoporotic fracture, to prevent osteoporosis  and reduce fracture risk.     The World Health Organization has defined the following categories based on bone  density:  Normal bone:  T-score greater than or equal to -1  Osteopenia: T-score  less than -1 and greater  than -2.5  Osteoporosis:  T-score less than or equal -2.5        LOCATION:  [facility]              Dictated by (CST): Lito Baltazar MD on 8/29/2024 at 9:25 AM      Finalized by (CST): Lito Baltazar MD on 8/29/2024 at 9:25 AM         Labs:  Lab Results   Component Value Date    WBC 4.5 03/03/2025    RBC 4.50 03/03/2025    HGB 13.8 03/03/2025    HCT 41.2 03/03/2025    .0 03/03/2025    MPV 10.1 01/16/2013    MCV 91.6 03/03/2025    MCH 30.7 03/03/2025    MCHC 33.5 03/03/2025    RDW 13.2 03/03/2025    NEPRELIM 2.65 03/03/2025    NEPERCENT 58.8 03/03/2025    LYPERCENT 30.8 03/03/2025    MOPERCENT 7.8 03/03/2025    EOPERCENT 2.2 03/03/2025    BAPERCENT 0.4 03/03/2025    NE 2.65 03/03/2025    LYMABS 1.39 03/03/2025    MOABSO 0.35 03/03/2025    EOABSO 0.10 03/03/2025    BAABSO 0.02 03/03/2025     Lab Results   Component Value Date    GLU 94 03/03/2025    BUN 14 03/03/2025    BUNCREA 13.9 03/03/2025    CREATSERUM 1.01 03/03/2025    ANIONGAP 7 03/03/2025    GFR 75 12/19/2017    GFRNAA 67 03/11/2022    GFRAA 78 03/11/2022    CA 9.5 03/03/2025    OSMOCALC 288 03/03/2025    ALKPHO 55 03/03/2025    AST 32 03/03/2025    ALT 22 03/03/2025    BILT 0.4 03/03/2025    TP 7.6 03/03/2025    ALB 4.8 03/03/2025    GLOBULIN 2.8 03/03/2025    AGRATIO 1.7 05/17/2011     03/03/2025    K 4.1 03/03/2025     03/03/2025    CO2 29.0 03/03/2025       Additional Labs:    03/2025  NISREEN 1: 320 homogenous  dsDNA, SSA, SSB, Smith, UN RP, RNP 70, centromere, SCL 70, Yaritza 1 negative  ESR 33 elevated  CRP normal  RF negative  CCP negative    08/2024  NISREEN 1:320 homogenous   dsDNA, SSA, SSB, U1RNP, RNP70, scl70, centromere, jo1 negative  ESR 47 elevated  CRP normal   RF negative  CCP negative  Uric acid normal     Maranda Mary, DO  [facility]  03/25/2025         [1]   No outpatient medications have been marked as taking for the  3/25/25 encounter (Virtual Phone E/M) with Maranda Hernandez DO.   [2] No Known Allergies

## 2025-04-07 ENCOUNTER — HOSPITAL ENCOUNTER (OUTPATIENT)
Dept: ULTRASOUND IMAGING | Age: 65
Discharge: HOME | End: 2025-04-07
Attending: OTOLARYNGOLOGY
Payer: COMMERCIAL

## 2025-04-07 DIAGNOSIS — E04.2 NONTOXIC MULTINODULAR GOITER: ICD-10-CM

## 2025-04-07 PROCEDURE — 76536 US EXAM OF HEAD AND NECK: CPT | Performed by: OTOLARYNGOLOGY

## 2025-04-14 ENCOUNTER — OFFICE VISIT (OUTPATIENT)
Facility: LOCATION | Age: 65
End: 2025-04-14
Payer: COMMERCIAL

## 2025-04-14 DIAGNOSIS — E04.2 MULTIPLE THYROID NODULES: Primary | ICD-10-CM

## 2025-04-14 PROCEDURE — 99214 OFFICE O/P EST MOD 30 MIN: CPT | Performed by: OTOLARYNGOLOGY

## 2025-04-14 NOTE — PROGRESS NOTES
Carlotta Vargas is a 64 year old female. No chief complaint on file.    HPI:   64-year-old white female we have been following for multinodular goiter she does not take thyroid medication she has no family history for thyroid cancer.  We had previous biopsy of the right thyroid nodule measuring 1.3 x 0.8 x 1.2 done 1-year ago was benign.  They had an air in the size of a left cystic nodule and did not biopsy that.  She is in for follow-up no new complaints  Current Medications[1]   Past Medical History[2]   Social History:  Short Social Hx on File[3]   Past Surgical History[4]      REVIEW OF SYSTEMS:   GENERAL HEALTH: feels well otherwise  GENERAL : denies fever, chills, sweats, weight loss, weight gain  SKIN: denies any unusual skin lesions or rashes  RESPIRATORY: denies shortness of breath with exertion  NEURO: denies headaches    EXAM:   There were no vitals taken for this visit.    System Pertinent findings Details   Constitutional  Overall appearance - Normal.   Head/Face  Facial features -- Normal. Skull - Normal.   Eyes  Pupils equal ,round ,react to light and accomidate   Ears  External Ear Right: Normal, Left: Normal. Canal - Right: Normal, Left: Normal. TM - Right: Normal left: Normal   Nose  External Nose, Normal, Septum -midline,Nasal Vault, clear. Turbinates - Right: Normal left: Normal   Mouth/Throat  Lips/teeth/gums - Normal. Tonsils -0+ oropharynx - Normal.   Neck Exam  Inspection - Normal. Palpation - Normal. Parotid gland - Normal. Thyroid gland -enlarged with nodularity   Lymph Detail  Submental. Submandibular. Anterior cervical. Posterior cervical. Supraclavicular.   Thyroid ultrasound dated 4/7/2025  Right thyroid  7 x 5 x 7 mm right mid stable  7 x 5 x 7 mm right mid stable  7 x 6 x 8 mm right mid stable  12 x 8 x 11 right mid stable previously biopsied benign  5 x 4 x 6 right inferior stable  Left thyroid  8 x 7 x 8 left mid stable  4 x 3 x 4 left mid stable      ASSESSMENT AND PLAN:    1. Multiple thyroid nodules  2-year follow-up thyroid ultrasound      The patient indicates understanding of these issues and agrees to the plan.      Robert Cortez MD  4/14/2025  2:26 PM       [1]   Current Outpatient Medications   Medication Sig Dispense Refill    Efinaconazole (JUBLIA) 10 % External Solution Apply to the affected toenails daily. Remove excess once a week with an alcohol pad. 8 mL 11    rosuvastatin 40 MG Oral Tab Take 1 tablet (40 mg total) by mouth nightly. 90 tablet 0    metroNIDAZOLE 0.75 % External Cream Apply 1 Application topically 2 (two) times daily. 45 g 2    pantoprazole 20 MG Oral Tab EC Take 1 tablet (20 mg total) by mouth every morning before breakfast. 90 tablet 1    alendronate 70 MG Oral Tab Take 1 tablet (70 mg total) by mouth every 7 days. 12 tablet 1    lisinopril 10 MG Oral Tab Take 1 tablet (10 mg total) by mouth daily. 90 tablet 1    SULFACLEANSE 8/4 8-4 % External Suspension Apply 1 Application  topically daily. 473 mL 3    clobetasol 0.05 % External Solution Apply to the scalp daily 50 mL 3    Adapalene 0.3 % External Gel Differen OTC      Vitamin D3 2000 units Oral Cap Take 1 capsule (2,000 Units total) by mouth daily.      Calcium Carbonate 600 MG Oral Tab Take 1 tablet (600 mg total) by mouth as needed with dialysis for Heartburn.      Multiple Vitamins-Minerals (ONE-A-DAY WOMENS 50 PLUS OR) Take by mouth. Centrum silver     [2]   Past Medical History:   Abdominal hernia    Constipation    occasionally    Diarrhea, unspecified    occasionally    Flatulence/gas pain/belching    It seems to be worse lately.    Heartburn    Hemorrhoids    High cholesterol    Hypercholesterolemia    Indigestion    Irregular bowel habits    Nausea    Night sweats    Other psoriasis    Painful swallowing    Problems with swallowing    Rosacea    Skin blushing/flushing    Unspecified essential hypertension    Vomiting    not stomach contents    Wears glasses   [3]   Social  History  Socioeconomic History    Marital status:    Tobacco Use    Smoking status: Never    Smokeless tobacco: Never   Vaping Use    Vaping status: Never Used   Substance and Sexual Activity    Alcohol use: Yes     Alcohol/week: 3.0 standard drinks of alcohol    Drug use: No    Sexual activity: Yes     Partners: Male     Comment: 9/16/2021   Other Topics Concern    Caffeine Concern Yes     Comment: 2 a day    Exercise Yes     Comment: walks     Social Drivers of Health     Food Insecurity: No Food Insecurity (3/4/2025)    NCSS - Food Insecurity     Worried About Running Out of Food in the Last Year: No     Ran Out of Food in the Last Year: No   Transportation Needs: No Transportation Needs (3/4/2025)    NCSS - Transportation     Lack of Transportation: No   Housing Stability: Not At Risk (3/4/2025)    NCSS - Housing/Utilities     Has Housing: Yes     Worried About Losing Housing: No     Unable to Get Utilities: No   [4]   Past Surgical History:  Procedure Laterality Date    Colonoscopy  2005, 2011    D & c  1985    Egd  12/1/2015    Endometrial ablation  2006    Thermal endometrial ablation    Hernia surgery      Inguinal hernia repair  1986    Other surgical history  1994    In Vitro Fertilization    Other surgical history  1996    ruptured ectopic    Tonsillectomy  1967

## 2025-04-16 NOTE — TELEPHONE ENCOUNTER
Medication: GABAPENTIN 300 MG Oral Cap    Date of last refill: 2/10/23    Last office visit: 1/13/23  Due back to clinic per last office note:  n/a  Date next office visit scheduled:  none        Last OV note recommendation:   The patient is a pleasant 72-year-old female with a history of lumbar radiculopathy. She is doing quite well after transforaminal epidural steroid injection. However, complains of discomfort now in the medial aspect of the thigh and groin. This is now related to her L5 radiculopathy. More likely related to the extruded disc with foraminal stenosis seen on her imaging at the L2-3 level. She feels that this is more an annoyance rather than constant pain. Has not want to move forward with any interventions at this time. Has done quite well with conservative management. Encourage patient to continue home exercise. Also refilled her gabapentin. [Time Spent: ___ minutes] : I have spent [unfilled] minutes of time on the encounter which excludes teaching and separately reported services.

## 2025-05-02 ENCOUNTER — HOSPITAL ENCOUNTER (OUTPATIENT)
Dept: CT IMAGING | Facility: HOSPITAL | Age: 65
Discharge: HOME | End: 2025-05-02
Attending: FAMILY MEDICINE
Payer: COMMERCIAL

## 2025-05-02 DIAGNOSIS — R91.8 OPACITY OF LUNG ON IMAGING STUDY: ICD-10-CM

## 2025-05-02 DIAGNOSIS — I77.810 AORTIC ECTASIA, THORACIC: ICD-10-CM

## 2025-05-02 PROCEDURE — 71275 CT ANGIOGRAPHY CHEST: CPT | Performed by: FAMILY MEDICINE

## 2025-06-02 RX ORDER — LISINOPRIL 10 MG/1
10 TABLET ORAL DAILY
Qty: 90 TABLET | Refills: 1 | Status: SHIPPED | OUTPATIENT
Start: 2025-06-02

## 2025-06-02 NOTE — TELEPHONE ENCOUNTER
Last office visit: 3/4/25   Protocol: pass  Requested medication(s) are due for refill today: yes  Requested medication(s) are on the active medication list same strength, form, dose/ sig: yes  Requested medication(s) are managed by provider: yes  Patient has already received a courtsey refill: no    NOV: 6/11/25    Asked to Return: 6/4/25

## 2025-06-10 ENCOUNTER — LAB ENCOUNTER (OUTPATIENT)
Dept: LAB | Facility: REFERENCE LAB | Age: 65
End: 2025-06-10
Attending: FAMILY MEDICINE
Payer: COMMERCIAL

## 2025-06-10 DIAGNOSIS — Z79.899 MEDICATION MANAGEMENT: ICD-10-CM

## 2025-06-10 DIAGNOSIS — D50.8 IRON DEFICIENCY ANEMIA SECONDARY TO INADEQUATE DIETARY IRON INTAKE: ICD-10-CM

## 2025-06-10 DIAGNOSIS — E78.5 DYSLIPIDEMIA: ICD-10-CM

## 2025-06-10 DIAGNOSIS — I25.10 CORONARY ARTERY DISEASE INVOLVING NATIVE CORONARY ARTERY OF NATIVE HEART WITHOUT ANGINA PECTORIS: ICD-10-CM

## 2025-06-10 DIAGNOSIS — I10 ESSENTIAL HYPERTENSION, BENIGN: ICD-10-CM

## 2025-06-10 LAB
ALBUMIN SERPL-MCNC: 4.7 G/DL (ref 3.2–4.8)
ALBUMIN/GLOB SERPL: 1.6 {RATIO} (ref 1–2)
ALP LIVER SERPL-CCNC: 56 U/L (ref 50–130)
ALT SERPL-CCNC: 27 U/L (ref 10–49)
ANION GAP SERPL CALC-SCNC: 11 MMOL/L (ref 0–18)
AST SERPL-CCNC: 39 U/L (ref ?–34)
BILIRUB SERPL-MCNC: 0.5 MG/DL (ref 0.2–1.1)
BUN BLD-MCNC: 9 MG/DL (ref 9–23)
BUN/CREAT SERPL: 9 (ref 10–20)
CALCIUM BLD-MCNC: 9.5 MG/DL (ref 8.7–10.4)
CHLORIDE SERPL-SCNC: 104 MMOL/L (ref 98–112)
CHOLEST SERPL-MCNC: 251 MG/DL (ref ?–200)
CO2 SERPL-SCNC: 25 MMOL/L (ref 21–32)
CREAT BLD-MCNC: 1 MG/DL (ref 0.55–1.02)
DEPRECATED HBV CORE AB SER IA-ACNC: 44 NG/ML (ref 50–306)
EGFRCR SERPLBLD CKD-EPI 2021: 63 ML/MIN/1.73M2 (ref 60–?)
FASTING PATIENT LIPID ANSWER: YES
FASTING STATUS PATIENT QL REPORTED: YES
GLOBULIN PLAS-MCNC: 3 G/DL (ref 2–3.5)
GLUCOSE BLD-MCNC: 87 MG/DL (ref 70–99)
HDLC SERPL-MCNC: 67 MG/DL (ref 40–59)
IRON SATN MFR SERPL: 27 % (ref 15–50)
IRON SERPL-MCNC: 94 UG/DL (ref 50–170)
LDLC SERPL CALC-MCNC: 141 MG/DL (ref ?–100)
NONHDLC SERPL-MCNC: 184 MG/DL (ref ?–130)
OSMOLALITY SERPL CALC.SUM OF ELEC: 288 MOSM/KG (ref 275–295)
POTASSIUM SERPL-SCNC: 4.5 MMOL/L (ref 3.5–5.1)
PROT SERPL-MCNC: 7.7 G/DL (ref 5.7–8.2)
SODIUM SERPL-SCNC: 140 MMOL/L (ref 136–145)
TOTAL IRON BINDING CAPACITY: 348 UG/DL (ref 250–425)
TRANSFERRIN SERPL-MCNC: 284 MG/DL (ref 250–380)
TRIGL SERPL-MCNC: 243 MG/DL (ref 30–149)
VLDLC SERPL CALC-MCNC: 46 MG/DL (ref 0–30)

## 2025-06-10 PROCEDURE — 80061 LIPID PANEL: CPT

## 2025-06-10 PROCEDURE — 80053 COMPREHEN METABOLIC PANEL: CPT

## 2025-06-10 PROCEDURE — 36415 COLL VENOUS BLD VENIPUNCTURE: CPT

## 2025-06-10 PROCEDURE — 82728 ASSAY OF FERRITIN: CPT

## 2025-06-10 PROCEDURE — 83540 ASSAY OF IRON: CPT

## 2025-06-10 PROCEDURE — 84466 ASSAY OF TRANSFERRIN: CPT

## 2025-06-11 ENCOUNTER — OFFICE VISIT (OUTPATIENT)
Dept: FAMILY MEDICINE CLINIC | Facility: CLINIC | Age: 65
End: 2025-06-11
Payer: COMMERCIAL

## 2025-06-11 ENCOUNTER — PATIENT MESSAGE (OUTPATIENT)
Dept: FAMILY MEDICINE CLINIC | Facility: CLINIC | Age: 65
End: 2025-06-11

## 2025-06-11 VITALS
BODY MASS INDEX: 26.07 KG/M2 | SYSTOLIC BLOOD PRESSURE: 110 MMHG | HEART RATE: 81 BPM | RESPIRATION RATE: 18 BRPM | DIASTOLIC BLOOD PRESSURE: 78 MMHG | OXYGEN SATURATION: 97 % | HEIGHT: 69 IN | WEIGHT: 176 LBS

## 2025-06-11 DIAGNOSIS — M85.89 OSTEOPENIA OF MULTIPLE SITES: ICD-10-CM

## 2025-06-11 DIAGNOSIS — E04.2 MULTIPLE THYROID NODULES: ICD-10-CM

## 2025-06-11 DIAGNOSIS — E55.9 VITAMIN D DEFICIENCY: ICD-10-CM

## 2025-06-11 DIAGNOSIS — E66.3 OVERWEIGHT (BMI 25.0-29.9): ICD-10-CM

## 2025-06-11 DIAGNOSIS — Z71.85 VACCINE COUNSELING: ICD-10-CM

## 2025-06-11 DIAGNOSIS — Z79.899 MEDICATION MANAGEMENT: ICD-10-CM

## 2025-06-11 DIAGNOSIS — G43.009 MIGRAINE WITHOUT AURA AND WITHOUT STATUS MIGRAINOSUS, NOT INTRACTABLE: ICD-10-CM

## 2025-06-11 DIAGNOSIS — I10 ESSENTIAL HYPERTENSION, BENIGN: ICD-10-CM

## 2025-06-11 DIAGNOSIS — L71.9 ROSACEA: ICD-10-CM

## 2025-06-11 DIAGNOSIS — E78.5 DYSLIPIDEMIA: Primary | ICD-10-CM

## 2025-06-11 DIAGNOSIS — I65.23 BILATERAL CAROTID ARTERY STENOSIS: ICD-10-CM

## 2025-06-11 DIAGNOSIS — L65.9 HAIR LOSS: ICD-10-CM

## 2025-06-11 DIAGNOSIS — I25.10 CORONARY ARTERY DISEASE INVOLVING NATIVE CORONARY ARTERY OF NATIVE HEART WITHOUT ANGINA PECTORIS: ICD-10-CM

## 2025-06-11 DIAGNOSIS — I77.810 AORTIC ECTASIA, THORACIC: ICD-10-CM

## 2025-06-11 DIAGNOSIS — M67.449 GANGLION CYST OF JOINT OF FINGER: ICD-10-CM

## 2025-06-11 DIAGNOSIS — K21.9 GASTROESOPHAGEAL REFLUX DISEASE, UNSPECIFIED WHETHER ESOPHAGITIS PRESENT: ICD-10-CM

## 2025-06-11 DIAGNOSIS — D50.8 IRON DEFICIENCY ANEMIA SECONDARY TO INADEQUATE DIETARY IRON INTAKE: ICD-10-CM

## 2025-06-11 PROCEDURE — 99214 OFFICE O/P EST MOD 30 MIN: CPT | Performed by: FAMILY MEDICINE

## 2025-06-11 RX ORDER — PANTOPRAZOLE SODIUM 20 MG/1
20 TABLET, DELAYED RELEASE ORAL
Qty: 90 TABLET | Refills: 1 | Status: SHIPPED | OUTPATIENT
Start: 2025-06-11 | End: 2025-06-11

## 2025-06-11 RX ORDER — PANTOPRAZOLE SODIUM 20 MG/1
20 TABLET, DELAYED RELEASE ORAL
Qty: 90 TABLET | Refills: 1 | Status: SHIPPED | OUTPATIENT
Start: 2025-06-11

## 2025-06-11 NOTE — PROGRESS NOTES
The following individual(s) verbally consented to be recorded using ambient AI listening technology and understand that they can each withdraw their consent to this listening technology at any point by asking the clinician to turn off or pause the recording:    Patient name: Carlotta Vargas

## 2025-06-11 NOTE — PROGRESS NOTES
Carlotta Vargas is a 65 year old female.  HPI:   Pt. Is here for a med check.    HTN -- pt is taking lisinopril -- no side effects; does not check BP at home  DyslipidemiaCAD -- pt is taking crestor-- no side effects; LDL improving  Migraines -- stable; cpm  Rosacea --  seeing Dr. Bray  Hair loss -- will discuss plaquinil with Dr. Ling  Onychomycosis -- on jublia per derm  Thyroid nodules -- stable per Dr. Cortez  -- retiring soon; due to recheck in 2027  Eczema -- stable -- per derm  Osteopenia -- dexa UTD; on alendronate since 8/2018 and doing well  Gyne -- Dr. Hess  Last eye exam -- 2/2025    Meds reviewed.    History of Present Illness  Carlotta Vargas is a 65 year old female who presents for a medication check.    She has been inconsistent with her cholesterol medication, rosuvastatin, due to recent life stressors, including the passing of her mother in March. Her diet has been suboptimal, with recent events such as a cruise and social gatherings leading to the consumption of foods and drinks high in sugar, potentially contributing to elevated triglyceride levels.    She is currently taking pantoprazole and requires a refill. She experiences indigestion after consuming tyra mix, which she suspects is due to its high sugar content.    She has been taking iron pills daily since her last visit due to low ferritin levels. She is concerned about donating blood, fearing it might affect her iron levels.    She has a history of a ganglion cyst on her wrist and suspects a similar cyst on her thumb, which is not currently painful or prominent. She has stopped taking Plaquenil due to gastrointestinal side effects, noting improvement in symptoms upon discontinuation.    She mentions a healed black spot on her skin, initially thought to be a tick bite, but without any characteristic rash associated with Lyme disease. She also reports bites from 'no seeums' that are prominent but not itchy.    She is  managing rosacea and toenail fungus, with recent approval for Jublia. Her thyroid nodules were stable on a recent CT scan with contrast. She has been on a bone density medication since 2018.    She had an eye exam in February due to concerns about Plaquenil affecting her eyes, and she reports no current issues with migraines.        Current Outpatient Medications   Medication Sig Dispense Refill    pantoprazole 20 MG Oral Tab EC Take 1 tablet (20 mg total) by mouth every morning before breakfast. 90 tablet 1    LISINOPRIL 10 MG Oral Tab TAKE 1 TABLET BY MOUTH EVERY DAY 90 tablet 1    Efinaconazole (JUBLIA) 10 % External Solution Apply to the affected toenails daily. Remove excess once a week with an alcohol pad. 8 mL 11    rosuvastatin 40 MG Oral Tab Take 1 tablet (40 mg total) by mouth nightly. 90 tablet 0    alendronate 70 MG Oral Tab Take 1 tablet (70 mg total) by mouth every 7 days. 12 tablet 1    SULFACLEANSE 8/4 8-4 % External Suspension Apply 1 Application  topically daily. 473 mL 3    clobetasol 0.05 % External Solution Apply to the scalp daily 50 mL 3    Adapalene 0.3 % External Gel Differen OTC      Vitamin D3 2000 units Oral Cap Take 1 capsule (2,000 Units total) by mouth daily.      Calcium Carbonate 600 MG Oral Tab Take 1 tablet (600 mg total) by mouth as needed with dialysis for Heartburn.      Multiple Vitamins-Minerals (ONE-A-DAY WOMENS 50 PLUS OR) Take by mouth. Centrum silver      hydroxychloroquine 200 MG Oral Tab Take 1 tablet (200 mg total) by mouth 2 (two) times daily. (Patient not taking: Reported on 6/11/2025) 60 tablet 2    metroNIDAZOLE 0.75 % External Cream Apply 1 Application topically 2 (two) times daily. (Patient not taking: Reported on 6/11/2025) 45 g 2      No Known Allergies   Past Medical History:    Abdominal hernia    Constipation    occasionally    Diarrhea, unspecified    occasionally    Flatulence/gas pain/belching    It seems to be worse lately.    Heartburn    Hemorrhoids     High cholesterol    Hypercholesterolemia    Indigestion    Irregular bowel habits    Nausea    Night sweats    Other psoriasis    Painful swallowing    Problems with swallowing    Rosacea    Skin blushing/flushing    Unspecified essential hypertension    Vomiting    not stomach contents    Wears glasses      Social History:  Social History     Socioeconomic History    Marital status:    Tobacco Use    Smoking status: Never    Smokeless tobacco: Never   Vaping Use    Vaping status: Never Used   Substance and Sexual Activity    Alcohol use: Yes     Alcohol/week: 3.0 standard drinks of alcohol    Drug use: No    Sexual activity: Yes     Partners: Male     Comment: 9/16/2021   Other Topics Concern    Caffeine Concern Yes     Comment: 2 a day    Exercise Yes     Comment: walks     Social Drivers of Health     Food Insecurity: No Food Insecurity (3/4/2025)    NCSS - Food Insecurity     Worried About Running Out of Food in the Last Year: No     Ran Out of Food in the Last Year: No   Transportation Needs: No Transportation Needs (3/4/2025)    NCSS - Transportation     Lack of Transportation: No   Housing Stability: Not At Risk (3/4/2025)    NCSS - Housing/Utilities     Has Housing: Yes     Worried About Losing Housing: No     Unable to Get Utilities: No        Results for orders placed or performed in visit on 06/10/25   Comp Metabolic Panel (14)    Collection Time: 06/10/25  8:26 AM   Result Value Ref Range    Glucose 87 70 - 99 mg/dL    Sodium 140 136 - 145 mmol/L    Potassium 4.5 3.5 - 5.1 mmol/L    Chloride 104 98 - 112 mmol/L    CO2 25.0 21.0 - 32.0 mmol/L    Anion Gap 11 0 - 18 mmol/L    BUN 9 9 - 23 mg/dL    Creatinine 1.00 0.55 - 1.02 mg/dL    BUN/CREA Ratio 9.0 (L) 10.0 - 20.0    Calcium, Total 9.5 8.7 - 10.4 mg/dL    Calculated Osmolality 288 275 - 295 mOsm/kg    eGFR-Cr 63 >=60 mL/min/1.73m2    ALT 27 10 - 49 U/L    AST 39 (H) <34 U/L    Alkaline Phosphatase 56 50 - 130 U/L    Bilirubin, Total 0.5 0.2 -  1.1 mg/dL    Total Protein 7.7 5.7 - 8.2 g/dL    Albumin 4.7 3.2 - 4.8 g/dL    Globulin  3.0 2.0 - 3.5 g/dL    A/G Ratio 1.6 1.0 - 2.0    Patient Fasting for CMP? Yes    Lipid Panel    Collection Time: 06/10/25  8:26 AM   Result Value Ref Range    Cholesterol, Total 251 (H) <200 mg/dL    HDL Cholesterol 67 (H) 40 - 59 mg/dL    Triglycerides 243 (H) 30 - 149 mg/dL    LDL Cholesterol 141 (H) <100 mg/dL    VLDL 46 (H) 0 - 30 mg/dL    Non HDL Chol 184 (H) <130 mg/dL    Patient Fasting for Lipid? Yes    Ferritin    Collection Time: 06/10/25  8:26 AM   Result Value Ref Range    Ferritin 44 (L) 50 - 306 ng/mL   Iron And Tibc    Collection Time: 06/10/25  8:26 AM   Result Value Ref Range    Iron 94 50 - 170 ug/dL    Transferrin 284 250 - 380 mg/dL    Total Iron Binding Capacity 348 250 - 425 ug/dL    % Saturation 27 15 - 50 %       REVIEW OF SYSTEMS:   GENERAL: feels well otherwise  SKIN: eczema and rosacea; eyebrow thinning  EYES:denies blurred vision or double vision  HEENT:  nasal congestion, sinus pain or ST  LUNGS: denies shortness of breath with exertion  CARDIOVASCULAR: denies chest pain on exertion  GI: denies abdominal pain,denies heartburn  : denies dysuria  MUSCULOSKELETAL: no back pain  NEURO: denies headaches  PSYCHE: denies depression or anxiety  HEMATOLOGIC:  hx of anemia  ENDOCRINE: denies thyroid history  ALL/ASTHMA: denies hx of allergy or asthma    EXAM:   /78   Pulse 81   Resp 18   Ht 5' 9\" (1.753 m)   Wt 176 lb (79.8 kg)   SpO2 97%   BMI 25.99 kg/m²   GENERAL: well developed, well nourished,in no apparent distress  PSYCHE: normal mood and affect  SKIN: no rashes,no suspicious lesions  NECK: supple,no adenopathy,no bruits, thyroid nodules  LUNGS: clear to auscultation  CARDIO: RRR without murmur  GI: good BS's,no masses, HSM or tenderness  EXTREMITIES: no cyanosis, clubbing or edema    ASSESSMENT AND PLAN:     Encounter Diagnoses   Name Primary?    Dyslipidemia Yes    Gastroesophageal  reflux disease, unspecified whether esophagitis present     Iron deficiency anemia secondary to inadequate dietary iron intake     Ganglion cyst of joint of finger     Hair loss     Essential hypertension, benign     Coronary artery disease involving native coronary artery of native heart without angina pectoris     Bilateral carotid artery stenosis     Aortic ectasia, thoracic     Migraine without aura and without status migrainosus, not intractable     Rosacea     Vitamin D deficiency     Osteopenia of multiple sites     Multiple thyroid nodules     Overweight (BMI 25.0-29.9)     Medication management     Vaccine counseling          Orders Placed This Encounter   Procedures    Ferritin    Iron And Tibc    CBC With Differential With Platelet       Meds & Refills for this Visit:  Requested Prescriptions     Signed Prescriptions Disp Refills    pantoprazole 20 MG Oral Tab EC 90 tablet 1     Sig: Take 1 tablet (20 mg total) by mouth every morning before breakfast.       Imaging & Consults:  None       HTN -- pt is taking lisinopril 10 mg daily   DyslipidemiaCAD -- rosuvastatin 40 mg nightly  Aortic ectasia -- monitor 3.7 cm -- less than 4 cm is stable; CTA chest stable  Migraines -- doing well  Rosacea -- stable per derm  Hair loss -- will speak with derm about it  Osteopenia -- stable on fosamax  Iron def anemia -- on FeSo4 325 mg daily and stable  Vitamin D def -- stable  GERD -- off pantoprazole  Lung opacity -- CT stable; no risk for 4 mm pulmonary nodule  -- repeat CT chest in 5/2025  -- stable  Thyroid nodules -- per Dr. Cortez;  scheduled  Vaccine counseling -- stable; CPM  Overweight -- continue to focus on diet and exercise      Mammo per gyne - Dr. Hess; UTD  Dexa is UTD.  Colonoscopy due in 2031.  -- Dr. DURANT  Sees derm and gyne.    Labs ordered for 3 months.      Assessment & Plan  Dyslipidemia  Non-compliance with cholesterol medication noted. Elevated triglycerides likely due to recent dietary  indiscretions. Re-evaluation suggested in three months.  - Encourage adherence to cholesterol medication.  - Repeat lipid panel in three months.    Iron Deficiency  Ferritin levels remain low despite daily oral iron supplementation. Blood donation affects hemoglobin but not iron stores.  - Continue daily oral iron supplementation.  - Check blood count, iron, and ferritin levels in September.    Osteoporosis  On osteoporosis medication since 2018, tolerates well. Continuation suggested.  - Continue osteoporosis medication.  - Bone density scan due in August 2026.    Thyroid Nodules  Thyroid nodules well-managed with no new symptoms or changes.  - Recheck thyroid nodules in 2027.    Ganglion Cyst  Ganglion cyst on thumb less prominent, prefers monitoring.  - Monitor ganglion cyst on thumb.    Rosacea  Continues management with Dr. Hernández.    General Health Maintenance  Considering COVID-19 booster before daughter's wedding in October. Awaiting new recommendations.  - Consider COVID-19 booster in September.    Follow-up  Scheduled for follow-up lab work in September.  - Schedule follow-up appointment in September for lab work.          The patient indicates understanding of these issues and agrees to the plan.  The patient is asked to return in Return in about 3 months (around 9/11/2025) for med check 30.

## (undated) DIAGNOSIS — I10 ESSENTIAL HYPERTENSION, BENIGN: Primary | ICD-10-CM

## (undated) DEVICE — REMOVER PREP SOLUTION 4OZ

## (undated) DEVICE — NEEDLE SPINAL 22X3-1/2 BLK

## (undated) DEVICE — GLOVE SURG SENSICARE SZ 7-1/2

## (undated) DEVICE — GLOVE SURG SENSICARE SZ 7

## (undated) DEVICE — PAIN TRAY: Brand: MEDLINE INDUSTRIES, INC.

## (undated) DEVICE — SKIN MARKER DUAL TIP WITH RULER CAP AND LABELS: Brand: DEVON

## (undated) DEVICE — BANDAID CURAD 3IN X 1IN

## (undated) NOTE — LETTER
09/12/24        Carlotta Vargas  [address] [place] [address]      Dear Carlotta,    Our records indicate that you have outstanding lab work and or testing that was ordered for you and has not yet been completed:  Orders Placed This Encounter      XR HAND BILAT (MIN 3 VIEWS) (CPT=73130-50)      XR FOOT, COMPLETE (MIN 3 VIEWS), LEFT (CPT=73630)      XR FOOT, COMPLETE (MIN 3 VIEWS), RIGHT (CPT=73630)    To provide you with the best possible care, please complete these orders at your earliest convenience. If you have recently completed these orders please disregard this letter.     If you have any questions please call the office at Dept: [phone].     Thank you,   [facility]

## (undated) NOTE — MR AVS SNAPSHOT
[facility], [address] [id number]               Thank you for choosing us for your health care visit with Fredy Plummer DO.   We are glad to serve you and happy to provide you with this summary call [facility] Scheduling at [phone].          Referral Details     Referred By    Referred To    DO Rikki Pina [address] [facility] [address]   Phone:  [phone]   Fax:  [phone]    Diagnoses:  Acute pain of le Physical           Medical Issues Discussed Today     Vitamin D deficiency    Routine general medical examination at a health care facility    -  Primary    Screening for genitourinary condition        Hair loss        Acute pain of left shoulder view more details from this visit by going to [email or web address]. If you've recently had a stay at the Hospital you can access your discharge instructions in [email or web address] by going to Visits < Admission Summaries.  If you've been to the Emergency Depar

## (undated) NOTE — LETTER
Patient Name: Violeta Hager  YOB: 1960          MRN number:  [id number]  Date:  3/4/2020  Referring Physician:  Eliza Basurto MD         SPINE EVALUATION:    Referring Physician: Dr. April Leon  Diagnosis: Acute left sided low back pain with correctly without reported pain. Skilled Physical Therapy is medically necessary to address the above impairments and reach functional goals. Precautions:  None  OBJECTIVE:   Observation/Posture:  Increased thoracic kyphosis  Neuro Screen: Normal    Lum 1. Improve flexibility of quads and hip flexors to minimal tightness to decrease ext stress on lumbar spine  2. Increase core strength to good to promote stability of the lumbar spine, decrease pain to 1/10 with walking  3.  Pt will be independent in self m

## (undated) NOTE — LETTER
Patient Name: Nilay Giraldo  : 1960  MRN: [id number]  Patient Address: [address] [place] [id number]      Coronavirus Disease 2019 (COVID-19)     [facility] is committed to the safety and well-being of our patients, members, 2. Monitor your symptoms carefully. If your symptoms get worse, call your healthcare provider immediately. 3. Get rest and stay hydrated.    4. If you have a medical appointment, call the healthcare provider ahead of time and tell them that you have or may ? At least 24 hours have passed since recovery defined as resolution of fever without the use of fever-reducing medications; and  · Improvement in respiratory symptoms (e.g., cough, shortness of breath); and  · At least 10 days have passed since symptoms f If you would be interested in donating your plasma to help treat others diagnosed with the virus, please contact [facility] directly on their website: [email or web address]    Who is eligible to donate convalescent plasma?

## (undated) NOTE — LETTER
08/03/21        Wade Buck  [address] [facility] [address]      Dear Benjamin Falcon records indicate that you have outstanding lab work and or testing that was ordered for you and has not yet been completed:  Orders Placed This Encounter

## (undated) NOTE — Clinical Note
Hi, Dr. Kim and Dr. Ventura. Thank you for referring Ms. Carlotta Vargas for rheumatologic evaluation. Please see the discussion portion of my note and let me know if you have any questions.   Maranda Hernandez, DO [facility] 3/11/2025